# Patient Record
Sex: FEMALE | Race: OTHER | NOT HISPANIC OR LATINO | Employment: OTHER | URBAN - METROPOLITAN AREA
[De-identification: names, ages, dates, MRNs, and addresses within clinical notes are randomized per-mention and may not be internally consistent; named-entity substitution may affect disease eponyms.]

---

## 2018-05-23 ENCOUNTER — APPOINTMENT (RX ONLY)
Dept: URBAN - METROPOLITAN AREA OTHER 10 | Facility: OTHER | Age: 63
Setting detail: DERMATOLOGY
End: 2018-05-23

## 2018-05-23 DIAGNOSIS — D22 MELANOCYTIC NEVI: ICD-10-CM

## 2018-05-23 DIAGNOSIS — B00.1 HERPESVIRAL VESICULAR DERMATITIS: ICD-10-CM

## 2018-05-23 DIAGNOSIS — L82.1 OTHER SEBORRHEIC KERATOSIS: ICD-10-CM

## 2018-05-23 DIAGNOSIS — L57.0 ACTINIC KERATOSIS: ICD-10-CM

## 2018-05-23 DIAGNOSIS — D18.0 HEMANGIOMA: ICD-10-CM

## 2018-05-23 DIAGNOSIS — L81.4 OTHER MELANIN HYPERPIGMENTATION: ICD-10-CM

## 2018-05-23 DIAGNOSIS — L20.89 OTHER ATOPIC DERMATITIS: ICD-10-CM

## 2018-05-23 PROBLEM — I10 ESSENTIAL (PRIMARY) HYPERTENSION: Status: ACTIVE | Noted: 2018-05-23

## 2018-05-23 PROBLEM — D22.72 MELANOCYTIC NEVI OF LEFT LOWER LIMB, INCLUDING HIP: Status: ACTIVE | Noted: 2018-05-23

## 2018-05-23 PROBLEM — D22.71 MELANOCYTIC NEVI OF RIGHT LOWER LIMB, INCLUDING HIP: Status: ACTIVE | Noted: 2018-05-23

## 2018-05-23 PROBLEM — D22.62 MELANOCYTIC NEVI OF LEFT UPPER LIMB, INCLUDING SHOULDER: Status: ACTIVE | Noted: 2018-05-23

## 2018-05-23 PROBLEM — D22.61 MELANOCYTIC NEVI OF RIGHT UPPER LIMB, INCLUDING SHOULDER: Status: ACTIVE | Noted: 2018-05-23

## 2018-05-23 PROBLEM — L20.84 INTRINSIC (ALLERGIC) ECZEMA: Status: ACTIVE | Noted: 2018-05-23

## 2018-05-23 PROBLEM — D22.5 MELANOCYTIC NEVI OF TRUNK: Status: ACTIVE | Noted: 2018-05-23

## 2018-05-23 PROBLEM — D18.01 HEMANGIOMA OF SKIN AND SUBCUTANEOUS TISSUE: Status: ACTIVE | Noted: 2018-05-23

## 2018-05-23 PROBLEM — L85.3 XEROSIS CUTIS: Status: ACTIVE | Noted: 2018-05-23

## 2018-05-23 PROCEDURE — 17000 DESTRUCT PREMALG LESION: CPT

## 2018-05-23 PROCEDURE — ? TREATMENT REGIMEN

## 2018-05-23 PROCEDURE — ? COUNSELING

## 2018-05-23 PROCEDURE — ? OTHER

## 2018-05-23 PROCEDURE — ? LIQUID NITROGEN

## 2018-05-23 PROCEDURE — ? PRESCRIPTION

## 2018-05-23 PROCEDURE — 99214 OFFICE O/P EST MOD 30 MIN: CPT | Mod: 25

## 2018-05-23 RX ORDER — TRETINOIN 0.25 MG/G
CREAM TOPICAL
Qty: 45 | Refills: 1 | COMMUNITY
Start: 2018-05-23

## 2018-05-23 RX ORDER — TRIAMCINOLONE ACETONIDE 1 MG/G
CREAM TOPICAL BID
Qty: 80 | Refills: 1 | Status: ERX

## 2018-05-23 RX ADMIN — TRETINOIN: 0.25 CREAM TOPICAL at 00:00

## 2018-05-23 ASSESSMENT — LOCATION DETAILED DESCRIPTION DERM
LOCATION DETAILED: LEFT ANTERIOR PROXIMAL THIGH
LOCATION DETAILED: LEFT MID-UPPER BACK
LOCATION DETAILED: XIPHOID
LOCATION DETAILED: RIGHT PROXIMAL POSTERIOR UPPER ARM
LOCATION DETAILED: RIGHT ANTERIOR DISTAL UPPER ARM
LOCATION DETAILED: LEFT DISTAL POSTERIOR UPPER ARM
LOCATION DETAILED: RIGHT POPLITEAL SKIN
LOCATION DETAILED: LEFT ANTERIOR DISTAL UPPER ARM
LOCATION DETAILED: SUPERIOR LUMBAR SPINE
LOCATION DETAILED: RIGHT CENTRAL TEMPLE
LOCATION DETAILED: LEFT POPLITEAL SKIN
LOCATION DETAILED: LEFT MEDIAL SUPERIOR CHEST
LOCATION DETAILED: LEFT PROXIMAL POSTERIOR THIGH
LOCATION DETAILED: RIGHT VENTRAL DISTAL FOREARM
LOCATION DETAILED: RIGHT DISTAL POSTERIOR THIGH
LOCATION DETAILED: LEFT PROXIMAL DORSAL FOREARM
LOCATION DETAILED: EPIGASTRIC SKIN
LOCATION DETAILED: RIGHT PROXIMAL PRETIBIAL REGION
LOCATION DETAILED: RIGHT BUTTOCK
LOCATION DETAILED: RIGHT ANTERIOR DISTAL THIGH
LOCATION DETAILED: RIGHT LATERAL ABDOMEN
LOCATION DETAILED: LEFT DISTAL PRETIBIAL REGION
LOCATION DETAILED: RIGHT SUPERIOR UPPER BACK

## 2018-05-23 ASSESSMENT — LOCATION ZONE DERM
LOCATION ZONE: LEG
LOCATION ZONE: TRUNK
LOCATION ZONE: ARM
LOCATION ZONE: FACE

## 2018-05-23 ASSESSMENT — LOCATION SIMPLE DESCRIPTION DERM
LOCATION SIMPLE: RIGHT POSTERIOR THIGH
LOCATION SIMPLE: RIGHT FOREARM
LOCATION SIMPLE: LEFT POSTERIOR THIGH
LOCATION SIMPLE: LEFT PRETIBIAL REGION
LOCATION SIMPLE: RIGHT POPLITEAL SKIN
LOCATION SIMPLE: ABDOMEN
LOCATION SIMPLE: RIGHT UPPER BACK
LOCATION SIMPLE: RIGHT POSTERIOR UPPER ARM
LOCATION SIMPLE: LEFT POSTERIOR UPPER ARM
LOCATION SIMPLE: LEFT UPPER ARM
LOCATION SIMPLE: LEFT FOREARM
LOCATION SIMPLE: CHEST
LOCATION SIMPLE: LEFT UPPER BACK
LOCATION SIMPLE: RIGHT THIGH
LOCATION SIMPLE: RIGHT UPPER ARM
LOCATION SIMPLE: RIGHT PRETIBIAL REGION
LOCATION SIMPLE: RIGHT BUTTOCK
LOCATION SIMPLE: LEFT POPLITEAL SKIN
LOCATION SIMPLE: LOWER BACK
LOCATION SIMPLE: LEFT THIGH
LOCATION SIMPLE: RIGHT TEMPLE

## 2018-05-23 NOTE — HPI: RASH
Is This A New Presentation, Or A Follow-Up?: Rash
Additional History: Established patient presents for a FBSE today. She complains of an itchy red rash on her lower legs that she has had for amount 3 months. She states that she used aloe, lemongrass, and other essential oils, but none have helped. She states that sometimes she gets an itchy rash on her chest, but it’s not present today.

## 2018-05-23 NOTE — PROCEDURE: TREATMENT REGIMEN
Detail Level: Simple
Initiate Treatment: Triamcinolone: Apply twice daily to eczema on lower legs for 2 weeks when flared.
Initiate Treatment: Tretinoin: Apply a pea sized amount to face once daily at night, avoiding lips and eyes

## 2018-05-23 NOTE — PROCEDURE: LIQUID NITROGEN
Post-Care Instructions: I reviewed with the patient in detail post-care instructions. Patient is to wear sunprotection, and avoid picking at any of the treated lesions. Pt may apply Vaseline to crusted or scabbing areas.
Render Post-Care Instructions In Note?: no
Consent: The patient's consent was obtained including but not limited to risks of crusting, scabbing, blistering, scarring, darker or lighter pigmentary change, recurrence, incomplete removal and infection.
Detail Level: Detailed
Number Of Freeze-Thaw Cycles: 2 freeze-thaw cycles
Duration Of Freeze Thaw-Cycle (Seconds): 5

## 2018-05-23 NOTE — PROCEDURE: OTHER
Other (Free Text): Defer Valtrex due to kidney disease
Note Text (......Xxx Chief Complaint.): This diagnosis correlates with the
Detail Level: Simple

## 2020-11-10 ENCOUNTER — LAB OUTSIDE AN ENCOUNTER (OUTPATIENT)
Dept: URBAN - METROPOLITAN AREA CLINIC 92 | Facility: CLINIC | Age: 65
End: 2020-11-10

## 2020-11-10 ENCOUNTER — TELEPHONE ENCOUNTER (OUTPATIENT)
Dept: URBAN - METROPOLITAN AREA CLINIC 92 | Facility: CLINIC | Age: 65
End: 2020-11-10

## 2020-11-22 ENCOUNTER — TELEPHONE ENCOUNTER (OUTPATIENT)
Dept: URBAN - METROPOLITAN AREA CLINIC 92 | Facility: CLINIC | Age: 65
End: 2020-11-22

## 2020-11-22 NOTE — HPI-TODAY'S VISIT:
This is a scheduled follow-up appointment for this patient, a 65 year old /White female, after a previous visit on Oct 2019, for an evaluation for hepatitis C and on treatment evaluation of her newly diagnosed cirrhosis by fibroscan. A ciopy of the note john be sent to the referring provider.  She is asymptomatic. The patient denies having HIV and hepatitis B. She denies alcohol use.      Pt here with her .    Nov 7 2019 glu 98 and cr 1.11 slightly up and na 138 and k 4.4 and cl 100 and total protein 8.2 slightly up. alb 4.6 and tb 0.4 and alk 61 and ast 18 and alt 17 wbc 9.5 and hg 12.9 and plat 335.  HCV pcr neg.  mri 2019:   Document Type: MRI Abdomen w/ + w/o Contrast Document Date: November 06, 2019 10:37  Document Status: Auth (Verified) Document Title: MRI Abdomen w/ + w/o Contrast Performed By: Geoff Farah IV  Verified By: Geoff Farah IV on November 06, 2019 11:23  Encounter info: [id number], [facility], Single Visit OP, 11/6/2019 - 11/6/2019   * Final Report *  Reason For Exam hx of renal cancer  REPORT EXAM: MRI Abdomen w/ + w/o Contrast  CLINICAL INDICATION: hx of renal cancer. Per prior reports, history of hepatitis C.  TECHNIQUE: Multisequence, multiplanar MRI of the abdomen was performed without and with intravenous contrast. ESRC.2.7.3   CONTRAST: 19 cc of Prohance  COMPARISON: Contrast MRI November 5, 2018, abdominal ultrasound March 12, 2019  FINDINGS:  Lower Thorax: Normal.  Liver: Marked out of phase signal drop. Normal hepatic morphology. Subtle small focus of arterial hyperenhancement in segment 2 (11:34) is less prominent, also seen on venous phase but not no washout on delayed, no T2 or diffusion signal, felt to be perfusional. No significant lesions. Vessels are patent.   Gallbladder/Biliary Tree: Normal.  Spleen: Normal size.  Pancreas: Normal.  Adrenal Glands: Normal.   Kidneys/Ureters: There are a few small bilateral cysts. Partial nephrectomy defect in the anterior mid left kidney is stable. No enhancing lesions.  Gastrointestinal: Normal.   Lymph Nodes: Normal.  Vessels: No varices. Incidental replaced right hepatic artery.  Peritoneum/Retroperitoneum: No ascites.  Bones/Soft Tissues: Normal.  IMPRESSION:      1. There is marked hepatic steatosis. Morphologically normal liver. No suspicious lesions. No stigmata of portal hypertension. 2. Stable postsurgical defect in the left kidney with no enhancing lesions.   Signature Line *** Final ***  Electronically Signed By:  Geoff Farah IV on  11/06/2019 11:23  Dictated by:  Geoff Farah IV    Document Type: XR Chest 2 Views PA + Lat Stnd Protocol Document Date: November 06, 2019 11:08  Document Status: Auth (Verified) Document Title: XR Chest 2 Views PA + Lat Stnd Protocol Performed By: Kwadwo Hare  Verified By: Kwadwo Hare on November 06, 2019 13:26  Encounter info: [id number], [facility], Single Visit OP, 11/6/2019 - 11/6/2019   * Final Report *  Reason For Exam Hx of renal cell cancer  REPORT EXAM: XR Chest 2 Views PA + Lat Stnd Protocol  CLINICAL INDICATION: Hx of renal cell cancer  ESRC.[id number]  COMPARISON: None   FINDINGS:  Support Devices: None   Lungs/Pleura: No pneumothorax. No consolidation. No pleural effusion.  Heart/Mediastinum: No cardiomegaly. Mild uncoiling of the aorta.  Bones/Soft tissues: No acute osseous abnormality. Surgical clips in region of GE junction.  IMPRESSION:  Minimal basilar atelectasis. CT chest has increased sensitivity for metastatic disease.   Signature Line *** Final ***  Electronically Signed By:  Kwadwo Hare on  11/06/2019 13:26  Dictated by:  Kwadwo Hare Dr saw: Plan  1. follow up 1 year with MRI abd with and without contrast and appointment afterwards to review results  2. contact us sooner with any concerns  She is going to do the mri in 6m to assess the perfusion changes.  She had a s/p robotic L partial nephrectomy showing papillary RCC in April 2018.   She had neurological issues and she saw Dr. Bazzi and not noted to have brain tumor, noted to have hemorrhorage, she has vascular abnormality. He referred to Dr Looney. She had a cath and looked at flow and saw an anomaly. Says also told re radiation. Nothing done and she says to return prn to see Dr Looney.   Prior labs 2019 na 136 and k 4.0 and alb 4.4 and tb 0.4 and alk 51 and ast 14 and alt 12 and wbc 9.0 and hg 12.5 and plat 282.afp 5.3 normal and hcv pcr negative.  March 2019 u/s:  Document Type: US Abdomen Complete Document Date: March 12, 2019 09:52  Document Status: Auth (Verified) Document Title: US Abdomen Complete Performed By: Paxton Ray  Verified By: Kalpesh Cisneros on March 12, 2019 10:31  Encounter info: [id number], [facility], Single Visit OP, 3/12/2019 - 3/12/2019  * Final Report *  Reason For Exam HEPATITIS C  REPORT EXAM: US Abdomen Complete, US Abdomen Doppler Complete  CLINICAL INDICATION: HEPATITIS C. elevated AFP; Port HTN; left renal cell carcinoma  TECHNIQUE: Grayscale, pulsed wave and color Doppler sonography of the upper abdomen were performed. ESRC.[id number]  COMPARISON: Abdominal ultrasound 7/7/2018  FINDINGS:  Liver: Coarsened echotexture. Echogenic shadowing focus within the anterior right hepatic lobe measuring 6 mm likely representing a small calcification  Bile Ducts: No dilated intrahepatic biliary radicles. Common duct measures 2 mm.  Gallbladder: Normal. Cornelius's sign is negative.  Pancreas: Normal.  Right Kidney: Measures 10.6 cm. Normal echogenicity. No hydronephrosis.  Left Kidney: Measures 10.9 cm. Normal echogenicity. No hydronephrosis.  Spleen: Measures 10.3 cm. Punctate splenic calcifications, likely the sequela of old granulomatous disease.  Aorta: Normal proximally, not imaged distally.   IVC: Normal proximally, not imaged distally.   Hepatic Veins: Normal.  Portal Veins: Hepatopetal flow. 17-25 cm/s.  Hepatic Arteries: Peak systolic velocity 37 cm/s. Resistive index 0.6.  Other: None.  IMPRESSION: 1. Coarsened hepatic echotexture with increased echogenicity consistent with chronic liver disease/hepatic steatosis. No suspicious liver lesions. Patent hepatic vasculature with normal direction of flow. 2. Punctate calcifications within the liver and spleen, likely the sequela of old granulomatous disease.  The images were reviewed and interpreted by Kalpesh Cisneros MD.  Signature Line *** Final ***  Electronically Signed By:  Kalpesh Cisneros on  03/12/2019 10:31  Dictated by:  Paxton Ray Dr to see him in Nov 2020.  IR report showed Nov 15 2018 grade 2 medial parietal avm.  9/11/18 labs gluc 91 cr 0.96 alp 56 tb 0.5 ast 16 alt 11 wbc 7 hgb 12.6 plt 304 apf 5 hep c negative  june 2018 u/s: * Final Report *  Reason For Exam Hepatitis C, chronic  REPORT EXAM: US Abdomen Doppler Complete, US Abdomen Complete  CLINICAL INDICATION: Hepatitis C, chronic. partial L nephrectomy  TECHNIQUE: Grayscale, pulsed wave and color Doppler sonography of the upper abdomen were performed.  COMPARISON: December 12, 2017  FINDINGS:  Liver: Mildly increased echogenicity and nodular contour. No lesions.  Bile Ducts: No dilated intrahepatic biliary radicles. Common duct measures 4 mm.  Gallbladder: Normal. Cornelius's sign is negative.  Pancreas: Obscured by overlying structures.  Right Kidney: Measures 10.7 cm. Normal echogenicity. No hydronephrosis.  Left Kidney: Measures 9.4 cm. Normal echogenicity. No hydronephrosis.  Spleen: Measures 11.8 cm.  Aorta: Normal.  IVC: Normal.  Hepatic Veins: Normal.  Portal Veins: Hepatopedal flow. 23 cm/s.  Hepatic Arteries: Peak systolic velocity 28 cm/s. Resistive index 0.7.  Other: None.  IMPRESSION: 1. Echogenic liver with nodular contour suggestive of hepatic steatosis/chronic liver disease. 2. Patent hepatic vasculature.  Signature Line *** Final ***  Electronically Signed By:  Kalpesh Cisneros on  06/07/2018 10:40  july 2018 ct c/a/p no acute findings, colonic diverticulosis, atherosclerosis, granulomatous dz noted in liver. post surgical changes suggested involvoing the left kidney  ct chead july 2018 acute parenchymal hemorrhage in right parietal lobe  4/16/18 kidney path:  KIDNEY, LEFT, MASS, ROBOTIC PARTIAL NEPHRECTOMY: Renal cell carcinoma, papillary type 1, greatest carcinoma dimension 4.4 cm Carcinoma is limited to the kidney Pedro nuclear grade G2 Clear cell change in approximately 10% tumor, no sarcomatoid change identified Carcinoma at inked parenchymal margin of excision (tumor grossly perforated) Uninvolved renal parenchyma with chronic interstitial nephritis  LYMPH NODES, PARA-AORTIC, EXCISION: No metastatic carcinoma identified in three lymph nodes (0/3) Focus of endosalpingiosis in one lymph node  SOFT TISSUE, UMM-TUMOR FAT, EXCISION: Benign fibroadipose tissue No carcinoma identified   Plan is to do spep to check the protein issue and redo labs and mri in 6m.  Duration of visit mins with over 50% of the time explaining pt's condition and treatment plan with the pt

## 2020-11-24 ENCOUNTER — TELEPHONE ENCOUNTER (OUTPATIENT)
Dept: URBAN - METROPOLITAN AREA CLINIC 92 | Facility: CLINIC | Age: 65
End: 2020-11-24

## 2020-11-24 LAB
A/G RATIO: 1.3
ALBUMIN: 4.3
ALKALINE PHOSPHATASE: 66
ALT (SGPT): 16
AST (SGOT): 18
BASO (ABSOLUTE): 0.1
BASOS: 1
BILIRUBIN, TOTAL: 0.2
BUN/CREATININE RATIO: 16
BUN: 17
CALCIUM: 9.5
CARBON DIOXIDE, TOTAL: 24
CHLORIDE: 99
CREATININE: 1.05
EGFR IF AFRICN AM: 64
EGFR IF NONAFRICN AM: 55
EOS (ABSOLUTE): 0.2
EOS: 2
GLOBULIN, TOTAL: 3.2
GLUCOSE: 122
HCV LOG10: (no result)
HEMATOCRIT: 35.9
HEMATOLOGY COMMENTS:: (no result)
HEMOGLOBIN: 12.1
HEPATITIS C QUANTITATION: <12
IMMATURE CELLS: (no result)
IMMATURE GRANS (ABS): 0
IMMATURE GRANULOCYTES: 1
INR: 0.9
LYMPHS (ABSOLUTE): 2.3
LYMPHS: 26
MCH: 31.2
MCHC: 33.7
MCV: 93
MONOCYTES(ABSOLUTE): 0.6
MONOCYTES: 7
NEUTROPHILS (ABSOLUTE): 5.6
NEUTROPHILS: 63
NRBC: (no result)
PLATELETS: 338
POTASSIUM: 4.2
PROTEIN, TOTAL: 7.5
PROTHROMBIN TIME: 10
RBC: 3.88
RDW: 13.2
SODIUM: 137
TEST INFORMATION:: (no result)
WBC: 8.7

## 2020-11-24 NOTE — HPI-OTHER HISTORIES
Dear Mary White The results of your recent tests are explained below: nov 10 and inr 0.9 and hcv <12. ast 18 and alt 16 so at ideal. Tb 0.2 and alk 66. na 137 and k 4.2 and cl 99 and co2 24 and bun 17 and cr 1.05 slightly up and glu 122 elevated and maybe not fasting and show local md. wbc 8.7 hg 12.1 plat 338. mcv 93.

## 2020-11-30 ENCOUNTER — OFFICE VISIT (OUTPATIENT)
Dept: URBAN - METROPOLITAN AREA TELEHEALTH 2 | Facility: TELEHEALTH | Age: 65
End: 2020-11-30
Payer: MEDICARE

## 2020-11-30 DIAGNOSIS — R74.8 ABNORMAL LIVER ENZYMES: ICD-10-CM

## 2020-11-30 DIAGNOSIS — K44.9 HIATAL HERNIA: ICD-10-CM

## 2020-11-30 DIAGNOSIS — Z85.528 HISTORY OF RENAL CELL CANCER: ICD-10-CM

## 2020-11-30 DIAGNOSIS — I10 HYPERTENSION: ICD-10-CM

## 2020-11-30 DIAGNOSIS — B96.81 HELICOBACTER PYLORI (H. PYLORI) INFECTION: ICD-10-CM

## 2020-11-30 DIAGNOSIS — K76.9 LIVER LESION: ICD-10-CM

## 2020-11-30 DIAGNOSIS — M10.09 GOUT, ARTHRITIS: ICD-10-CM

## 2020-11-30 DIAGNOSIS — K74.00 HEPATIC FIBROSIS: ICD-10-CM

## 2020-11-30 DIAGNOSIS — Z79.899 HIGH RISK MEDICATION USE: ICD-10-CM

## 2020-11-30 DIAGNOSIS — C18.9 COLON CANCER: ICD-10-CM

## 2020-11-30 DIAGNOSIS — B18.2 CHRONIC ACTIVE HEPATITIS C: ICD-10-CM

## 2020-11-30 DIAGNOSIS — Z85.038 HISTORY OF COLON CANCER: ICD-10-CM

## 2020-11-30 PROCEDURE — 99214 OFFICE O/P EST MOD 30 MIN: CPT

## 2020-11-30 PROCEDURE — 1036F TOBACCO NON-USER: CPT

## 2020-11-30 PROCEDURE — G9903 PT SCRN TBCO ID AS NON USER: HCPCS

## 2020-11-30 PROCEDURE — G8427 DOCREV CUR MEDS BY ELIG CLIN: HCPCS

## 2020-11-30 PROCEDURE — G8417 CALC BMI ABV UP PARAM F/U: HCPCS

## 2020-11-30 PROCEDURE — G8483 FLU IMM NO ADMIN DOC REA: HCPCS

## 2020-11-30 PROCEDURE — 3017F COLORECTAL CA SCREEN DOC REV: CPT

## 2020-11-30 RX ORDER — ESTRADIOL 1 MG/1
TAKE 1 TABLET (1 MG) BY ORAL ROUTE ONCE DAILY TABLET ORAL 1
Qty: 0 | Refills: 0 | Status: ACTIVE | COMMUNITY
Start: 1900-01-01

## 2020-11-30 RX ORDER — ASCORBIC ACID 125 MG
AS DIRECTED TABLET,CHEWABLE ORAL
Status: ACTIVE | COMMUNITY

## 2020-11-30 RX ORDER — ENALAPRIL MALEATE AND HYDROCHLOROTHIAZIDE 10; 25 MG/1; MG/1
TAKE 1 TABLET BY ORAL ROUTE ONCE DAILY TABLET ORAL 1
Qty: 0 | Refills: 0 | Status: ACTIVE | COMMUNITY
Start: 1900-01-01

## 2020-11-30 RX ORDER — UBIDECARENONE/VIT E ACET 100MG-5
1 CAPSULE CAPSULE ORAL ONCE A DAY
Status: ACTIVE | COMMUNITY

## 2020-11-30 RX ORDER — DILTIAZEM HYDROCHLORIDE 240 MG/1
TAKE 1 CAPSULE (240 MG) BY ORAL ROUTE ONCE DAILY CAPSULE, COATED, EXTENDED RELEASE ORAL 1
Qty: 0 | Refills: 0 | Status: ACTIVE | COMMUNITY
Start: 1900-01-01

## 2020-11-30 RX ORDER — LEVOTHYROXINE SODIUM 100 UG/1
TAKE 1 TABLET (100 MCG) BY ORAL ROUTE ONCE DAILY TABLET ORAL 1
Qty: 0 | Refills: 0 | Status: ACTIVE | COMMUNITY
Start: 1900-01-01

## 2020-11-30 RX ORDER — MAGNESIUM OXIDE 400 MG/1
1 TABLET AS NEEDED TABLET ORAL ONCE A DAY
Status: ACTIVE | COMMUNITY

## 2020-11-30 NOTE — HPI-TODAY'S VISIT:
This is a scheduled follow-up appointment for this patient, a 65 year old /White female, after a previous visit on Oct 2019, for an evaluation for hepatitis C and on treatment evaluation of her newly diagnosed cirrhosis by fibroscan.   A ciopy of the note john be sent to the referring provider.    Chart review:  Nov 10 and inr 0.9 and hcv  less than 12. ast 18 and alt 16 so at ideal. Tb 0.2 and alk 66. na 137 and k 4.2 and cl 99 and co2 24 and bun 17 and cr 1.05 slightly up and glu 122 elevated and maybe not fasting and show local md. wbc 8.7 hg 12.1 plat 338. mcv 93.  She says she is not sure if she was fasting or not. She says brother in law  and going to [place] and so think ate on the run to do this and then go there.  Says sugar in 2020 ok.  Dr Sanderson: saw recently in 2020 and in person and he told her he will next year do just a sonogram in  his office. Said looked good.  2020 na 1398 and k 3.5 and cl 103 and bun 18 and cr 1.30 and glu 131 and alb 4.2 and tv 0.3 and ast 17 and alt 15 and alk 56 and wbc 10.3 and hg 12.6 plat 329.    		 Document Type:	MRI Abdomen w/ + w/o Contrast Document Date:	November 10, 2020 11:36  Document Status:	Auth (Verified) Document Title:	MRI Abdomen w/ + w/o Contrast Performed By:	Juanpablo Reza  Verified By:	Juanpablo Reza on November 10, 2020 13:26  Encounter info:	[id number], [facility], Single Visit OP, 11/10/2020 - 11/10/2020   * Final Report *  Reason For Exam surveillance; left robotic partial nephrectomy 2018 for a stage I papillary renal cell carcinoma, pt has abdominal aneurysm;Malignant neoplasm  REPORT EXAM: MRI Abdomen w/ + w/o Contrast  CLINICAL INDICATION: Surveillance status post left robotic partial for papillary renal cell carcinoma.   TECHNIQUE: Multisequence, multiplanar MRI of the abdomen was performed without and with intravenous contrast. ESRC.2.7.3  CONTRAST: 19 cc of Prohance  COMPARISON: 2019  FINDINGS:   Lower Thorax: Normal.  Liver: Moderate diffuse hepatic steatosis. Unchanged 1 cm arterially hyperenhancing lesion within segment 2 without correlate on other sequences, likely a perfusional anomaly (9:34). No suspicious hepatic lesion.  Gallbladder/Biliary Tree: Normal.  Spleen: Normal.  Pancreas: Normal.  Adrenal Glands: Normal.   Kidneys/Ureters: Prior partial left nephrectomy without locally recurrent disease. Multiple bilateral renal cysts. No hydronephrosis.  Gastrointestinal: Normal.   Lymph Nodes: Normal.  Vessels: Nonaneurysmal aorta. Replaced right hepatic artery arises from the SMA.  Peritoneum/Retroperitoneum: Normal.  Bones/Soft Tissues: Postsurgical changes within anterior abdominal wall. Multilevel spinal degenerative changes. No aggressive bone lesions.   IMPRESSION: Prior left partial nephrectomy without locally recurrent or metastatic disease within the abdomen.  Signature Line *** Final ***  Electronically Signed By:  Juanpablo Reza on  11/10/2020 13:26  Dictated by:  Juanpablo Reza  need mri in 6m for the perfusion change.  2019 glu 98 and cr 1.11 slightly up and na 138 and k 4.4 and cl 100 and total protein 8.2 slightly up. alb 4.6 and tb 0.4 and alk 61 and ast 18 and alt 17 wbc 9.5 and hg 12.9 and plat 335.  HCV pcr neg.  mri 2019:   Document Type: MRI Abdomen w/ + w/o Contrast Document Date: 2019 10:37  Document Status: Auth (Verified) Document Title: MRI Abdomen w/ + w/o Contrast Performed By: Geoff Farah IV  Verified By: Geoff Farah IV on 2019 11:23  Encounter info: [id number], [facility], Single Visit OP, 2019 - 2019   * Final Report *  Reason For Exam hx of renal cancer  REPORT EXAM: MRI Abdomen w/ + w/o Contrast  CLINICAL INDICATION: hx of renal cancer. Per prior reports, history of hepatitis C.  TECHNIQUE: Multisequence, multiplanar MRI of the abdomen was performed without and with intravenous contrast. ESRC.2.7.3   CONTRAST: 19 cc of Prohance  COMPARISON: Contrast MRI 2018, abdominal ultrasound 2019  FINDINGS:  Lower Thorax: Normal.  Liver: Marked out of phase signal drop. Normal hepatic morphology. Subtle small focus of arterial hyperenhancement in segment 2 (11:34) is less prominent, also seen on venous phase but not no washout on delayed, no T2 or diffusion signal, felt to be perfusional. No significant lesions. Vessels are patent.   Gallbladder/Biliary Tree: Normal.  Spleen: Normal size.  Pancreas: Normal.  Adrenal Glands: Normal.   Kidneys/Ureters: There are a few small bilateral cysts. Partial nephrectomy defect in the anterior mid left kidney is stable. No enhancing lesions.  Gastrointestinal: Normal.   Lymph Nodes: Normal.  Vessels: No varices. Incidental replaced right hepatic artery.  Peritoneum/Retroperitoneum: No ascites.  Bones/Soft Tissues: Normal.  IMPRESSION:      1. There is marked hepatic steatosis. Morphologically normal liver. No suspicious lesions. No stigmata of portal hypertension. 2. Stable postsurgical defect in the left kidney with no enhancing lesions.   Signature Line *** Final ***  Electronically Signed By:  Geoff Farah IV on  2019 11:23  Dictated by:  Geoff Farah IV    Document Type: XR Chest 2 Views PA + Lat Stnd Protocol Document Date: 2019 11:08  Document Status: Auth (Verified) Document Title: XR Chest 2 Views PA + Lat Stnd Protocol Performed By: Kwadwo Hare  Verified By: Kwadwo Hare on 2019 13:26  Encounter info: [id number], [facility], Single Visit OP, 2019 - 2019   * Final Report *  Reason For Exam Hx of renal cell cancer  REPORT EXAM: XR Chest 2 Views PA + Lat Stnd Protocol  CLINICAL INDICATION: Hx of renal cell cancer  ESRC.4.6.1  COMPARISON: None   FINDINGS:  Support Devices: None   Lungs/Pleura: No pneumothorax. No consolidation. No pleural effusion.  Heart/Mediastinum: No cardiomegaly. Mild uncoiling of the aorta.  Bones/Soft tissues: No acute osseous abnormality. Surgical clips in region of GE junction.  IMPRESSION:  Minimal basilar atelectasis. CT chest has increased sensitivity for metastatic disease.   Signature Line *** Final ***  Electronically Signed By:  Kwadwo Hare on  2019 13:26  Dictated by:  Kwadwo Hare  She had a s/p robotic L partial nephrectomy showing papillary RCC in 2018.   She had neurological issues and she saw Dr. Bazzi and not noted to have brain tumor, noted to have hemorrhorage, she has vascular abnormality. He referred to Dr Looney. She had a cath and looked at flow and saw an anomaly. Says also told re radiation. Nothing done and she says to return prn to see Dr Looney.   Prior labs  na 136 and k 4.0 and alb 4.4 and tb 0.4 and alk 51 and ast 14 and alt 12 and wbc 9.0 and hg 12.5 and plat 282.afp 5.3 normal and hcv pcr negative.  2019 u/s:  Document Type: US Abdomen Complete Document Date: 2019 09:52  Document Status: Auth (Verified) Document Title: US Abdomen Complete Performed By: Paxton Ray  Verified By: Kalpesh Cisneros on 2019 10:31  Encounter info: [id number], [facility], Single Visit OP, 3/12/2019 - 3/12/2019  * Final Report *  Reason For Exam HEPATITIS C  REPORT EXAM: US Abdomen Complete, US Abdomen Doppler Complete  CLINICAL INDICATION: HEPATITIS C. elevated AFP; Port HTN; left renal cell carcinoma  TECHNIQUE: Grayscale, pulsed wave and color Doppler sonography of the upper abdomen were performed. ESRC.3.7.1  COMPARISON: Abdominal ultrasound 2018  FINDINGS:  Liver: Coarsened echotexture. Echogenic shadowing focus within the anterior right hepatic lobe measuring 6 mm likely representing a small calcification  Bile Ducts: No dilated intrahepatic biliary radicles. Common duct measures 2 mm.  Gallbladder: Normal. Cornelius's sign is negative.  Pancreas: Normal.  Right Kidney: Measures 10.6 cm. Normal echogenicity. No hydronephrosis.  Left Kidney: Measures 10.9 cm. Normal echogenicity. No hydronephrosis.  Spleen: Measures 10.3 cm. Punctate splenic calcifications, likely the sequela of old granulomatous disease.  Aorta: Normal proximally, not imaged distally.   IVC: Normal proximally, not imaged distally.   Hepatic Veins: Normal.  Portal Veins: Hepatopetal flow. 17-25 cm/s.  Hepatic Arteries: Peak systolic velocity 37 cm/s. Resistive index 0.6.  Other: None.  IMPRESSION: 1. Coarsened hepatic echotexture with increased echogenicity consistent with chronic liver disease/hepatic steatosis. No suspicious liver lesions. Patent hepatic vasculature with normal direction of flow. 2. Punctate calcifications within the liver and spleen, likely the sequela of old granulomatous disease.  The images were reviewed and interpreted by Kalpesh Cisneros MD.  Signature Line *** Final ***  Electronically Signed By:  Kalpesh Cisneros on  2019 10:31  Dictated by:  Paxton Ray Dr to see him in 2020.  IR report showed Nov 15 2018 grade 2 medial parietal avm.  18 labs gluc 91 cr 0.96 alp 56 tb 0.5 ast 16 alt 11 wbc 7 hgb 12.6 plt 304 apf 5 hep c negative  2018 u/s: * Final Report *  Reason For Exam Hepatitis C, chronic  REPORT EXAM: US Abdomen Doppler Complete, US Abdomen Complete  CLINICAL INDICATION: Hepatitis C, chronic. partial L nephrectomy  TECHNIQUE: Grayscale, pulsed wave and color Doppler sonography of the upper abdomen were performed.  COMPARISON: 2017  FINDINGS:  Liver: Mildly increased echogenicity and nodular contour. No lesions.  Bile Ducts: No dilated intrahepatic biliary radicles. Common duct measures 4 mm.  Gallbladder: Normal. Cornelius's sign is negative.  Pancreas: Obscured by overlying structures.  Right Kidney: Measures 10.7 cm. Normal echogenicity. No hydronephrosis.  Left Kidney: Measures 9.4 cm. Normal echogenicity. No hydronephrosis.  Spleen: Measures 11.8 cm.  Aorta: Normal.  IVC: Normal.  Hepatic Veins: Normal.  Portal Veins: Hepatopedal flow. 23 cm/s.  Hepatic Arteries: Peak systolic velocity 28 cm/s. Resistive index 0.7.  Other: None.  IMPRESSION: 1. Echogenic liver with nodular contour suggestive of hepatic steatosis/chronic liver disease. 2. Patent hepatic vasculature.  Signature Line *** Final ***  Electronically Signed By:  Kalpesh Cisneros on  2018 10:40  2018 ct c/a/p no acute findings, colonic diverticulosis, atherosclerosis, granulomatous dz noted in liver. post surgical changes suggested involvoing the left kidney  ct chead 2018 acute parenchymal hemorrhage in right parietal lobe  18 kidney path:  KIDNEY, LEFT, MASS, ROBOTIC PARTIAL NEPHRECTOMY: Renal cell carcinoma, papillary type 1, greatest carcinoma dimension 4.4 cm Carcinoma is limited to the kidney Pedro nuclear grade G2 Clear cell change in approximately 10% tumor, no sarcomatoid change identified Carcinoma at inked parenchymal margin of excision (tumor grossly perforated) Uninvolved renal parenchyma with chronic interstitial nephritis  LYMPH NODES, PARA-AORTIC, EXCISION: No metastatic carcinoma identified in three lymph nodes (0/3) Focus of endosalpingiosis in one lymph node  SOFT TISSUE, UMM-TUMOR FAT, EXCISION: Benign fibroadipose tissue No carcinoma identified   Plan: 1. Mri in may to check on perfusion change. 2. Pt will do labs in may. 3. Follow with primary md for her sugars.   Stressed to pt the need for social distancing and strict handwashing and wearing a mask and to follow any other new or added CDC recommendations as this is an evolving target.  Duration of visit 32 mins via doximKout audio/phone as doximity video failed with over 50% of the time explaining pt's condition and treatment plan with the pt.

## 2021-03-09 ENCOUNTER — LAB OUTSIDE AN ENCOUNTER (OUTPATIENT)
Dept: URBAN - METROPOLITAN AREA CLINIC 92 | Facility: CLINIC | Age: 66
End: 2021-03-09

## 2021-03-09 ENCOUNTER — TELEPHONE ENCOUNTER (OUTPATIENT)
Dept: URBAN - METROPOLITAN AREA CLINIC 92 | Facility: CLINIC | Age: 66
End: 2021-03-09

## 2021-03-09 PROBLEM — 429699009: Status: ACTIVE | Noted: 2021-03-09

## 2021-03-09 PROBLEM — 428305005: Status: ACTIVE | Noted: 2021-03-09

## 2021-04-20 ENCOUNTER — OFFICE VISIT (OUTPATIENT)
Dept: URBAN - METROPOLITAN AREA SURGERY CENTER 30 | Facility: SURGERY CENTER | Age: 66
End: 2021-04-20
Payer: MEDICARE

## 2021-04-20 DIAGNOSIS — Z86.010 H/O ADENOMATOUS POLYP OF COLON: ICD-10-CM

## 2021-04-20 PROCEDURE — G0105 COLORECTAL SCRN; HI RISK IND: HCPCS | Performed by: INTERNAL MEDICINE

## 2021-04-20 PROCEDURE — G8907 PT DOC NO EVENTS ON DISCHARG: HCPCS | Performed by: INTERNAL MEDICINE

## 2021-05-03 ENCOUNTER — OFFICE VISIT (OUTPATIENT)
Dept: URBAN - METROPOLITAN AREA CLINIC 74 | Facility: CLINIC | Age: 66
End: 2021-05-03

## 2021-05-13 ENCOUNTER — TELEPHONE ENCOUNTER (OUTPATIENT)
Dept: URBAN - METROPOLITAN AREA CLINIC 92 | Facility: CLINIC | Age: 66
End: 2021-05-13

## 2021-05-13 NOTE — HPI-TODAY'S VISIT:
Brandy White, May 11 MRI shows the liver to be fatty at 15 to 16%.  Desired is less than 6%.  Minimal surface nodularity still seen in the liver but without definite findings of cirrhosis. Redemonstrated 6 mm segment to hyper and see lesion seen which they feel is a perfusion anomaly.  We should check on that in 6 months. Gallbladder biliary tree were normal.  Spleen normal.  Pancreas normal.  Postsurgical changes seen of partial left nephrectomy but without evidence of recurrence seen. Redemonstrated subcentimeter left renal cyst seen. They mention that you have an anatomic variant of the replaced left hepatic artery arising from the left gastric artery.  Also accessory or replaced right hepatic artery arising from the superior mesenteric artery. Postsurgical changes also seen along the anterior abdominal wall. Overall they found no evidence of recurrent or metastatic disease within the abdomen from the previous left partial nephrectomy and the liver was fatty at 15 to 16% fat. Dr. Lomeli

## 2021-05-15 ENCOUNTER — TELEPHONE ENCOUNTER (OUTPATIENT)
Dept: URBAN - METROPOLITAN AREA CLINIC 92 | Facility: CLINIC | Age: 66
End: 2021-05-15

## 2021-05-15 LAB
A/G RATIO: 1.4
ALBUMIN: 4.7
ALKALINE PHOSPHATASE: 63
ALT (SGPT): 20
AST (SGOT): 24
BASO (ABSOLUTE): 0.1
BASOS: 1
BILIRUBIN, TOTAL: 0.3
BUN/CREATININE RATIO: 13
BUN: 13
CALCIUM: 9.5
CARBON DIOXIDE, TOTAL: 22
CHLORIDE: 102
CREATININE: 1.03
EGFR IF AFRICN AM: 65
EGFR IF NONAFRICN AM: 56
EOS (ABSOLUTE): 0.2
EOS: 2
GLOBULIN, TOTAL: 3.3
GLUCOSE: 82
HCV LOG10: (no result)
HEMATOCRIT: 37.3
HEMATOLOGY COMMENTS:: (no result)
HEMOGLOBIN: 12.9
HEPATITIS C QUANTITATION: <12
IMMATURE CELLS: (no result)
IMMATURE GRANS (ABS): 0.1
IMMATURE GRANULOCYTES: 1
LYMPHS (ABSOLUTE): 2.2
LYMPHS: 23
MCH: 31.4
MCHC: 34.6
MCV: 91
MONOCYTES(ABSOLUTE): 0.6
MONOCYTES: 6
NEUTROPHILS (ABSOLUTE): 6.4
NEUTROPHILS: 67
NRBC: (no result)
PLATELETS: 363
POTASSIUM: 4.3
PROTEIN, TOTAL: 8
RBC: 4.11
RDW: 13.1
SODIUM: 140
TEST INFORMATION:: (no result)
WBC: 9.5

## 2021-05-15 NOTE — HPI-TODAY'S VISIT:
May 11: hcv pcr less than 12 and so great to see. Ast 24 and alt 20 and alk 63 and tb 0.3 and ca 9.5 and na 140 and k 4.3 and cr 1.03 and bun 13. Glu 82. Wbc 9.5 and hg 12.9 and plat 363. Mcv 91.  Prior cr 1.05 and so little lower now 1.03.

## 2021-05-17 ENCOUNTER — LAB OUTSIDE AN ENCOUNTER (OUTPATIENT)
Dept: URBAN - METROPOLITAN AREA TELEHEALTH 2 | Facility: TELEHEALTH | Age: 66
End: 2021-05-17

## 2021-05-24 ENCOUNTER — OFFICE VISIT (OUTPATIENT)
Dept: URBAN - METROPOLITAN AREA TELEHEALTH 2 | Facility: TELEHEALTH | Age: 66
End: 2021-05-24
Payer: MEDICARE

## 2021-05-24 DIAGNOSIS — K76.89 LESION OF LIVER: ICD-10-CM

## 2021-05-24 DIAGNOSIS — R74.8 ABNORMAL LIVER ENZYMES: ICD-10-CM

## 2021-05-24 DIAGNOSIS — K74.00 HEPATIC FIBROSIS: ICD-10-CM

## 2021-05-24 DIAGNOSIS — B18.2 CHRONIC ACTIVE HEPATITIS C: ICD-10-CM

## 2021-05-24 PROCEDURE — 99214 OFFICE O/P EST MOD 30 MIN: CPT

## 2021-05-24 RX ORDER — ESTRADIOL 1 MG/1
TAKE 1 TABLET (1 MG) BY ORAL ROUTE ONCE DAILY TABLET ORAL 1
Qty: 0 | Refills: 0 | Status: ACTIVE | COMMUNITY
Start: 1900-01-01

## 2021-05-24 RX ORDER — DILTIAZEM HYDROCHLORIDE 240 MG/1
TAKE 1 CAPSULE (240 MG) BY ORAL ROUTE ONCE DAILY CAPSULE, COATED, EXTENDED RELEASE ORAL 1
Qty: 0 | Refills: 0 | Status: ACTIVE | COMMUNITY
Start: 1900-01-01

## 2021-05-24 RX ORDER — COMPOUNDING SYRUP VEHICLE 1 G/ML
AS DIRECTED SYRUP ORAL
Status: ACTIVE | COMMUNITY

## 2021-05-24 RX ORDER — ENALAPRIL MALEATE AND HYDROCHLOROTHIAZIDE 10; 25 MG/1; MG/1
TAKE 1 TABLET BY ORAL ROUTE ONCE DAILY TABLET ORAL 1
Qty: 0 | Refills: 0 | Status: ACTIVE | COMMUNITY
Start: 1900-01-01

## 2021-05-24 RX ORDER — ASCORBIC ACID 125 MG
AS DIRECTED TABLET,CHEWABLE ORAL
Status: ACTIVE | COMMUNITY

## 2021-05-24 RX ORDER — LEVOTHYROXINE SODIUM 100 UG/1
TAKE 1 TABLET (100 MCG) BY ORAL ROUTE ONCE DAILY TABLET ORAL 1
Qty: 0 | Refills: 0 | Status: ACTIVE | COMMUNITY
Start: 1900-01-01

## 2021-05-24 RX ORDER — UBIDECARENONE/VIT E ACET 100MG-5
1 CAPSULE CAPSULE ORAL ONCE A DAY
Status: ACTIVE | COMMUNITY

## 2021-05-24 RX ORDER — MAGNESIUM OXIDE 400 MG/1
1 TABLET AS NEEDED TABLET ORAL ONCE A DAY
Status: ACTIVE | COMMUNITY

## 2021-05-24 NOTE — HPI-TODAY'S VISIT:
This is a scheduled follow-up appointment for this patient, a 65 year old /White female, after a previous visit 2020  for an evaluation for hepatitis C and on treatment evaluation of her newly diagnosed cirrhosis by fibroscan.   A copy of the note john be sent to the referring provider.    Chart review:  May 11: hcv pcr less than 12 and so great to see. Ast 24 and alt 20 and alk 63 and tb 0.3 and ca 9.5 and na 140 and k 4.3 and cr 1.03 and bun 13. Glu 82. Wbc 9.5 and hg 12.9 and plat 363. Mcv 91.  Prior cr 1.05 and so little lower now 1.03.   May 11 MRI shows the liver to be fatty at 15 to 16%.  Desired is less than 6%.  Minimal surface nodularity still seen in the liver but without definite findings of cirrhosis. Redemonstrated 6 mm segment to hyperechoic lesion seen which they feel is a perfusion anomaly.  We should check on that in 6 months. Gallbladder biliary tree were normal.  Spleen normal.  Pancreas normal.  Postsurgical changes seen of partial left nephrectomy but without evidence of recurrence seen. Redemonstrated subcentimeter left renal cyst seen. They mention that you have an anatomic variant of the replaced left hepatic artery arising from the left gastric artery.  Also accessory or replaced right hepatic artery arising from the superior mesenteric artery. Postsurgical changes also seen along the anterior abdominal wall. Overall they found no evidence of recurrent or metastatic disease within the abdomen from the previous left partial nephrectomy and the liver was fatty at 15 to 16% fat.  Nov 10 and inr 0.9 and hcv  less than 12. ast 18 and alt 16 so at ideal. Tb 0.2 and alk 66. na 137 and k 4.2 and cl 99 and co2 24 and bun 17 and cr 1.05 slightly up and glu 122 elevated and maybe not fasting and show local md. wbc 8.7 hg 12.1 plat 338. mcv 93.    She says brother in law  and going to [place] and so think ate on the run to do this and then go there.  Dr Sanderson: saw recently in 2020 and in person and he told her he will next year do just a sonogram in  his office. Said looked good.  2020 na 1398 and k 3.5 and cl 103 and bun 18 and cr 1.30 and glu 131 and alb 4.2 and tv 0.3 and ast 17 and alt 15 and alk 56 and wbc 10.3 and hg 12.6 plat 329.    		 Document Type:	MRI Abdomen w/ + w/o Contrast Document Date:	November 10, 2020 11:36  Document Status:	Auth (Verified) Document Title:	MRI Abdomen w/ + w/o Contrast Performed By:	Juanpablo Reza  Verified By:	Juanpablo Reza on November 10, 2020 13:26  Encounter info:	[id number], [facility], Single Visit OP, 11/10/2020 - 11/10/2020   * Final Report *  Reason For Exam surveillance; left robotic partial nephrectomy 2018 for a stage I papillary renal cell carcinoma, pt has abdominal aneurysm;Malignant neoplasm  REPORT EXAM: MRI Abdomen w/ + w/o Contrast  CLINICAL INDICATION: Surveillance status post left robotic partial for papillary renal cell carcinoma.   TECHNIQUE: Multisequence, multiplanar MRI of the abdomen was performed without and with intravenous contrast. ESRC.2.7.3  CONTRAST: 19 cc of Prohance  COMPARISON: 2019  FINDINGS:   Lower Thorax: Normal.  Liver: Moderate diffuse hepatic steatosis. Unchanged 1 cm arterially hyperenhancing lesion within segment 2 without correlate on other sequences, likely a perfusional anomaly (9:34). No suspicious hepatic lesion.  Gallbladder/Biliary Tree: Normal.  Spleen: Normal.  Pancreas: Normal.  Adrenal Glands: Normal.   Kidneys/Ureters: Prior partial left nephrectomy without locally recurrent disease. Multiple bilateral renal cysts. No hydronephrosis.  Gastrointestinal: Normal.   Lymph Nodes: Normal.  Vessels: Nonaneurysmal aorta. Replaced right hepatic artery arises from the SMA.  Peritoneum/Retroperitoneum: Normal.  Bones/Soft Tissues: Postsurgical changes within anterior abdominal wall. Multilevel spinal degenerative changes. No aggressive bone lesions.   IMPRESSION: Prior left partial nephrectomy without locally recurrent or metastatic disease within the abdomen.  Signature Line *** Final ***  Electronically Signed By:  Juanpablo Reza on  11/10/2020 13:26  Dictated by:  Juanpablo Reza    2019 glu 98 and cr 1.11 slightly up and na 138 and k 4.4 and cl 100 and total protein 8.2 slightly up. alb 4.6 and tb 0.4 and alk 61 and ast 18 and alt 17 wbc 9.5 and hg 12.9 and plat 335.  HCV pcr neg.  mri 2019:   Document Type: MRI Abdomen w/ + w/o Contrast Document Date: 2019 10:37  Document Status: Auth (Verified) Document Title: MRI Abdomen w/ + w/o Contrast Performed By: Geoff Farah IV  Verified By: Geoff Farah IV on 2019 11:23  Encounter info: [id number], [facility], Single Visit OP, 2019 - 2019   * Final Report *  Reason For Exam hx of renal cancer  REPORT EXAM: MRI Abdomen w/ + w/o Contrast  CLINICAL INDICATION: hx of renal cancer. Per prior reports, history of hepatitis C.  TECHNIQUE: Multisequence, multiplanar MRI of the abdomen was performed without and with intravenous contrast. ESRC.2.7.3   CONTRAST: 19 cc of Prohance  COMPARISON: Contrast MRI 2018, abdominal ultrasound 2019  FINDINGS:  Lower Thorax: Normal.  Liver: Marked out of phase signal drop. Normal hepatic morphology. Subtle small focus of arterial hyperenhancement in segment 2 (11:34) is less prominent, also seen on venous phase but not no washout on delayed, no T2 or diffusion signal, felt to be perfusional. No significant lesions. Vessels are patent.   Gallbladder/Biliary Tree: Normal.  Spleen: Normal size.  Pancreas: Normal.  Adrenal Glands: Normal.   Kidneys/Ureters: There are a few small bilateral cysts. Partial nephrectomy defect in the anterior mid left kidney is stable. No enhancing lesions.  Gastrointestinal: Normal.   Lymph Nodes: Normal.  Vessels: No varices. Incidental replaced right hepatic artery.  Peritoneum/Retroperitoneum: No ascites.  Bones/Soft Tissues: Normal.  IMPRESSION:      1. There is marked hepatic steatosis. Morphologically normal liver. No suspicious lesions. No stigmata of portal hypertension. 2. Stable postsurgical defect in the left kidney with no enhancing lesions.   Signature Line *** Final ***  Electronically Signed By:  Geoff Farah IV on  2019 11:23  Dictated by:  Geoff Farah IV    Document Type: XR Chest 2 Views PA + Lat Stnd Protocol Document Date: 2019 11:08  Document Status: Auth (Verified) Document Title: XR Chest 2 Views PA + Lat Stnd Protocol Performed By: Kwadwo Hare  Verified By: Kwadwo Hare on 2019 13:26  Encounter info: [id number], [facility], Single Visit OP, 2019 - 2019   * Final Report *  Reason For Exam Hx of renal cell cancer  REPORT EXAM: XR Chest 2 Views PA + Lat Stnd Protocol  CLINICAL INDICATION: Hx of renal cell cancer  ESRC.4.6.1  COMPARISON: None   FINDINGS:  Support Devices: None   Lungs/Pleura: No pneumothorax. No consolidation. No pleural effusion.  Heart/Mediastinum: No cardiomegaly. Mild uncoiling of the aorta.  Bones/Soft tissues: No acute osseous abnormality. Surgical clips in region of GE junction.  IMPRESSION:  Minimal basilar atelectasis. CT chest has increased sensitivity for metastatic disease.   Signature Line *** Final ***  Electronically Signed By:  Kwadwo Hare on  2019 13:26  Dictated by:  Kwadwo Hare  She had a s/p robotic L partial nephrectomy showing papillary RCC in 2018.   She had neurological issues and she saw Dr. Bazzi and not noted to have brain tumor, noted to have hemorrhorage, she has vascular abnormality. He referred to Dr Looney. She had a cath and looked at flow and saw an anomaly. Says also told re radiation. Nothing done and she says to return prn to see Dr Looney.  She has not seen him again and no other issues.  She has not done the covid 19 vaccine and says she does not feel needs it. We discussed this.  Prior labs  na 136 and k 4.0 and alb 4.4 and tb 0.4 and alk 51 and ast 14 and alt 12 and wbc 9.0 and hg 12.5 and plat 282.afp 5.3 normal and hcv pcr negative.  2019 u/s:  Document Type: US Abdomen Complete Document Date: 2019 09:52  Document Status: Auth (Verified) Document Title: US Abdomen Complete Performed By: Paxton Ray  Verified By: Kalpesh Cisneros on 2019 10:31  Encounter [facility]: [id number], [facility], 3/12/2019 - 3/12/2019  * Final Report *  Reason For Exam HEPATITIS C  REPORT EXAM: US Abdomen Complete, US Abdomen Doppler Complete  CLINICAL INDICATION: HEPATITIS C. elevated AFP; Port HTN; left renal cell carcinoma  TECHNIQUE: Grayscale, pulsed wave and color Doppler sonography of the upper abdomen were performed. ESRC.[id number]  COMPARISON: Abdominal ultrasound 2018  FINDINGS:  Liver: Coarsened echotexture. Echogenic shadowing focus within the anterior right hepatic lobe measuring 6 mm likely representing a small calcification  Bile Ducts: No dilated intrahepatic biliary radicles. Common duct measures 2 mm.  Gallbladder: Normal. Cornelius's sign is negative.  Pancreas: Normal.  Right Kidney: Measures 10.6 cm. Normal echogenicity. No hydronephrosis.  Left Kidney: Measures 10.9 cm. Normal echogenicity. No hydronephrosis.  Spleen: Measures 10.3 cm. Punctate splenic calcifications, likely the sequela of old granulomatous disease.  Aorta: Normal proximally, not imaged distally.   IVC: Normal proximally, not imaged distally.   Hepatic Veins: Normal.  Portal Veins: Hepatopetal flow. 17-25 cm/s.  Hepatic Arteries: Peak systolic velocity 37 cm/s. Resistive index 0.6.  Other: None.  IMPRESSION: 1. Coarsened hepatic echotexture with increased echogenicity consistent with chronic liver disease/hepatic steatosis. No suspicious liver lesions. Patent hepatic vasculature with normal direction of flow. 2. Punctate calcifications within the liver and spleen, likely the sequela of old granulomatous disease.  The images were reviewed and interpreted by Kalpesh Cisneros MD.  Signature Line *** Final ***  Electronically Signed By:  Kalpesh Cisneros on  2019 10:31  Dictated by:  Paxton Ray   IR report showed Nov 15 2018 grade 2 medial parietal avm.  18 labs gluc 91 cr 0.96 alp 56 tb 0.5 ast 16 alt 11 wbc 7 hgb 12.6 plt 304 apf 5 hep c negative  2018 u/s: * Final Report *  Reason For Exam Hepatitis C, chronic  REPORT EXAM: US Abdomen Doppler Complete, US Abdomen Complete  CLINICAL INDICATION: Hepatitis C, chronic. partial L nephrectomy  TECHNIQUE: Grayscale, pulsed wave and color Doppler sonography of the upper abdomen were performed.  COMPARISON: 2017  FINDINGS:  Liver: Mildly increased echogenicity and nodular contour. No lesions.  Bile Ducts: No dilated intrahepatic biliary radicles. Common duct measures 4 mm.  Gallbladder: Normal. Cornelius's sign is negative.  Pancreas: Obscured by overlying structures.  Right Kidney: Measures 10.7 cm. Normal echogenicity. No hydronephrosis.  Left Kidney: Measures 9.4 cm. Normal echogenicity. No hydronephrosis.  Spleen: Measures 11.8 cm.  Aorta: Normal.  IVC: Normal.  Hepatic Veins: Normal.  Portal Veins: Hepatopedal flow. 23 cm/s.  Hepatic Arteries: Peak systolic velocity 28 cm/s. Resistive index 0.7.  Other: None.  IMPRESSION: 1. Echogenic liver with nodular contour suggestive of hepatic steatosis/chronic liver disease. 2. Patent hepatic vasculature.  Signature Line *** Final ***  Electronically Signed By:  Kalpesh Cisneros on  2018 10:40  2018 ct c/a/p no acute findings, colonic diverticulosis, atherosclerosis, granulomatous dz noted in liver. post surgical changes suggested involvoing the left kidney  ct chead 2018 acute parenchymal hemorrhage in right parietal lobe  18 kidney path:  KIDNEY, LEFT, MASS, ROBOTIC PARTIAL NEPHRECTOMY: Renal cell carcinoma, papillary type 1, greatest carcinoma dimension 4.4 cm Carcinoma is limited to the kidney Pedro nuclear grade G2 Clear cell change in approximately 10% tumor, no sarcomatoid change identified Carcinoma at inked parenchymal margin of excision (tumor grossly perforated) Uninvolved renal parenchyma with chronic interstitial nephritis  LYMPH NODES, PARA-AORTIC, EXCISION: No metastatic carcinoma identified in three lymph nodes (0/3) Focus of endosalpingiosis in one lymph node  SOFT TISSUE, UMM-TUMOR FAT, EXCISION: Benign fibroadipose tissue No carcinoma identified   She did colon with dr Mcgee and do in 5 yrs .  Plan: 1. Mri in Nov  to check on perfusion change. 2. Pt will do labs in Nov. 3. Follow with primary md for her sugars.   Stressed to pt the need for social distancing and strict handwashing and wearing a mask and to follow any other new or added CDC recommendations as this is an evolving target.  Duration of visit 31 mins via [employer] video failed with over 50% of the time explaining pt's condition and treatment plan with the pt.

## 2021-11-01 ENCOUNTER — LAB OUTSIDE AN ENCOUNTER (OUTPATIENT)
Dept: URBAN - METROPOLITAN AREA TELEHEALTH 2 | Facility: TELEHEALTH | Age: 66
End: 2021-11-01

## 2021-11-01 ENCOUNTER — TELEPHONE ENCOUNTER (OUTPATIENT)
Dept: URBAN - METROPOLITAN AREA CLINIC 92 | Facility: CLINIC | Age: 66
End: 2021-11-01

## 2021-11-01 NOTE — HPI-TODAY'S VISIT:
Deabraydon White,  November 1 MRI back. Lower thorax shows median sternotomy wires. Liver showed fat deposition and redemonstration of mild surface nodularity. No fat quant given. There are some perfusion abnormalities that are seen throughout the liver and that requires a 6-month follow-up suurveillance of this. Gallbladder and biliary tree appeared to be normal. Spleen was normal. Pancreas was normal. Subcentimeter left simple cyst seen in the kidney.  Status post left partial nephrectomy noted.  No evidence of local recurrence seen. Lymph nodes normal. Liver vessels normal. Retroperitoneum reported to be normal. No aggressive osseous lesions and postsurgical changes seen in the ventral abdominal midline. Overall they saw postsurgical changes of partial left nephrectomy 11th recurrent or metastatic disease seen.  The liver is fatty appearing but they did not state by how much fat percentage. Sometimes technically they are not able to do that measurement. Dr. Lomeli

## 2021-11-09 ENCOUNTER — TELEPHONE ENCOUNTER (OUTPATIENT)
Dept: URBAN - METROPOLITAN AREA CLINIC 92 | Facility: CLINIC | Age: 66
End: 2021-11-09

## 2021-11-09 LAB
A/G RATIO: 1.3
ALBUMIN: 4.5
ALKALINE PHOSPHATASE: 67
ALT (SGPT): 17
AST (SGOT): 21
BASO (ABSOLUTE): 0.1
BASOS: 1
BILIRUBIN, TOTAL: 0.3
BUN/CREATININE RATIO: 18
BUN: 19
CALCIUM: 9.3
CARBON DIOXIDE, TOTAL: 22
CHLORIDE: 99
CREATININE: 1.08
EGFR IF AFRICN AM: 61
EGFR IF NONAFRICN AM: 53
EOS (ABSOLUTE): 0.2
EOS: 2
GLOBULIN, TOTAL: 3.6
GLUCOSE: 106
HCV LOG10: (no result)
HEMATOCRIT: 36.9
HEMATOLOGY COMMENTS:: (no result)
HEMOGLOBIN: 12.3
HEPATITIS C QUANTITATION: <12
IMMATURE CELLS: (no result)
IMMATURE GRANS (ABS): 0
IMMATURE GRANULOCYTES: 0
INR: 0.9
LYMPHS (ABSOLUTE): 2.2
LYMPHS: 23
MCH: 30.4
MCHC: 33.3
MCV: 91
MONOCYTES(ABSOLUTE): 0.6
MONOCYTES: 6
NEUTROPHILS (ABSOLUTE): 6.3
NEUTROPHILS: 68
NRBC: (no result)
PLATELETS: 336
POTASSIUM: 4.4
PROTEIN, TOTAL: 8.1
PROTHROMBIN TIME: 9.9
RBC: 4.04
RDW: 13.1
SODIUM: 136
TEST INFORMATION:: (no result)
WBC: 9.5

## 2021-11-09 NOTE — HPI-TODAY'S VISIT:
Dear Mary White, November labs show white cell count 9.5 hemoglobin 12.3 platelet count 336 MCV 91 neutrophils 6.3 lymphocytes 2.2.  These are all normal range.   Glucose slightly up at 106 previously was 82.  Maybe you were not fasting this day? Share with local provider. BUN of 19 creatinine 1.08 which is slightly up and previously was 1.03 but prior also was 1.05 last year. Sodium 136 potassium 4.4 calcium 9.3 albumin 4.5 bilirubin 0.3 alkaline phosphatase 67 AST 21 ALT 17.  Previously AST 24 and ALT 20. Hep C PCR less than 12 and not detected. INR normal at 0.9. Meld low at 7 and meld na 7. Dr Lomeli

## 2021-11-30 ENCOUNTER — OFFICE VISIT (OUTPATIENT)
Dept: URBAN - METROPOLITAN AREA TELEHEALTH 2 | Facility: TELEHEALTH | Age: 66
End: 2021-11-30
Payer: MEDICARE

## 2021-11-30 DIAGNOSIS — B18.2 CHRONIC ACTIVE HEPATITIS C: ICD-10-CM

## 2021-11-30 DIAGNOSIS — K76.9 LIVER LESION: ICD-10-CM

## 2021-11-30 DIAGNOSIS — Z79.899 HIGH RISK MEDICATION USE: ICD-10-CM

## 2021-11-30 DIAGNOSIS — K74.00 HEPATIC FIBROSIS: ICD-10-CM

## 2021-11-30 PROCEDURE — 99214 OFFICE O/P EST MOD 30 MIN: CPT

## 2021-11-30 RX ORDER — ESTRADIOL 1 MG/1
TAKE 1 TABLET (1 MG) BY ORAL ROUTE ONCE DAILY TABLET ORAL 1
Qty: 0 | Refills: 0 | Status: ACTIVE | COMMUNITY
Start: 1900-01-01

## 2021-11-30 RX ORDER — ASCORBIC ACID 125 MG
AS DIRECTED TABLET,CHEWABLE ORAL
Status: ACTIVE | COMMUNITY

## 2021-11-30 RX ORDER — UBIDECARENONE/VIT E ACET 100MG-5
1 CAPSULE CAPSULE ORAL ONCE A DAY
Status: ACTIVE | COMMUNITY

## 2021-11-30 RX ORDER — COMPOUNDING SYRUP VEHICLE 1 G/ML
AS DIRECTED SYRUP ORAL
Status: ACTIVE | COMMUNITY

## 2021-11-30 RX ORDER — LEVOTHYROXINE SODIUM 100 UG/1
TAKE 1 TABLET (100 MCG) BY ORAL ROUTE ONCE DAILY TABLET ORAL 1
Qty: 0 | Refills: 0 | Status: ACTIVE | COMMUNITY
Start: 1900-01-01

## 2021-11-30 RX ORDER — ENALAPRIL MALEATE AND HYDROCHLOROTHIAZIDE 10; 25 MG/1; MG/1
TAKE 1 TABLET BY ORAL ROUTE ONCE DAILY TABLET ORAL 1
Qty: 0 | Refills: 0 | Status: ACTIVE | COMMUNITY
Start: 1900-01-01

## 2021-11-30 RX ORDER — DILTIAZEM HYDROCHLORIDE 240 MG/1
TAKE 1 CAPSULE (240 MG) BY ORAL ROUTE ONCE DAILY CAPSULE, COATED, EXTENDED RELEASE ORAL 1
Qty: 0 | Refills: 0 | Status: ACTIVE | COMMUNITY
Start: 1900-01-01

## 2021-11-30 RX ORDER — MAGNESIUM OXIDE 400 MG/1
1 TABLET AS NEEDED TABLET ORAL ONCE A DAY
Status: ACTIVE | COMMUNITY

## 2021-11-30 NOTE — HPI-TODAY'S VISIT:
This is a scheduled follow-up appointment for this patient, a 66 year old /White female, after a previous visit May 2021  for an evaluation for hepatitis C and on treatment evaluation of her newly diagnosed cirrhosis by fibroscan.   A copy of the note john be sent to the referring provider.   Chart review:  November 2021 labs show white cell count 9.5 hemoglobin 12.3 platelet count 336 MCV 91 neutrophils 6.3 lymphocytes 2.2.  These are all normal range.   Glucose slightly up at 106 previously was 82.  Maybe not fasting this day but she says it was and so may want to share with local provider. BUN of 19 creatinine 1.08 which is slightly up and previously was 1.03 but prior also was 1.05 last year. Sodium 136 potassium 4.4 calcium 9.3 albumin 4.5 bilirubin 0.3 alkaline phosphatase 67 AST 21 ALT 17.  Previously AST 24 and ALT 20. Hep C PCR less than 12 and not detected. INR normal at 0.9. Meld low at 7 and meld na 7.  She says on cardizem cd and enalapril 10/25mg for her bp.  November 1 MRI back. Lower thorax shows median sternotomy wires. Liver showed fat deposition and redemonstration of mild surface nodularity. No fat quant given. There are some perfusion abnormalities that are seen throughout the liver and that requires a 6-month follow-up surveillance of this. Gallbladder and biliary tree appeared to be normal. Spleen was normal. Pancreas was normal. Subcentimeter left simple cyst seen in the kidney.  Status post left partial nephrectomy noted.  No evidence of local recurrence seen. Lymph nodes normal. Liver vessels normal. Retroperitoneum reported to be normal. No aggressive osseous lesions and postsurgical changes seen in the ventral abdominal midline. Overall they saw postsurgical changes of partial left nephrectomy with no recurrent or metastatic disease seen.  The liver is fatty appearing but they did not state by how much fat percentage. Sometimes technically they are not able to do that measurement.   May 11: hcv pcr less than 12 and so great to see. Ast 24 and alt 20 and alk 63 and tb 0.3 and ca 9.5 and na 140 and k 4.3 and cr 1.03 and bun 13. Glu 82. Wbc 9.5 and hg 12.9 and plat 363. Mcv 91.  Prior cr 1.05 and so little lower now 1.03.   May 11 2021 MRI shows the liver to be fatty at 15 to 16%.  Desired is less than 6%.  Minimal surface nodularity still seen in the liver but without definite findings of cirrhosis. Redemonstrated 6 mm segment to hyperechoic lesion seen which they feel is a perfusion anomaly.  We should check on that in 6 months. Gallbladder biliary tree were normal.  Spleen normal.  Pancreas normal.  Postsurgical changes seen of partial left nephrectomy but without evidence of recurrence seen. Redemonstrated subcentimeter left renal cyst seen. They mention that you have an anatomic variant of the replaced left hepatic artery arising from the left gastric artery.  Also accessory or replaced right hepatic artery arising from the superior mesenteric artery. Postsurgical changes also seen along the anterior abdominal wall. Overall they found no evidence of recurrent or metastatic disease within the abdomen from the previous left partial nephrectomy and the liver was fatty at 15 to 16% fat.  Nov 2020 10 and inr 0.9 and hcv  less than 12. ast 18 and alt 16 so at ideal. Tb 0.2 and alk 66. na 137 and k 4.2 and cl 99 and co2 24 and bun 17 and cr 1.05 slightly up and glu 122 elevated and maybe not fasting and show local md. wbc 8.7 hg 12.1 plat 338. mcv 93.    Dr Sanderson: usuaully sees late in year and he will see tosay and prior saw in Nov 2020.  Nov 13 2020 na 1398 and k 3.5 and cl 103 and bun 18 and cr 1.30 and glu 131 and alb 4.2 and tb 0.3 and ast 17 and alt 15 and alk 56 and wbc 10.3 and hg 12.6 plat 329.    		 Document Type:	MRI Abdomen w/ + w/o Contrast Document Date:	November 10, 2020 11:36  Document Status:	Auth (Verified) Document Title:	MRI Abdomen w/ + w/o Contrast Performed By:	Juanpablo Reza  Verified By:	Juanpablo Reza on November 10, 2020 13:26  Encounter info:	[id number], [facility], Single Visit OP, 11/10/2020 - 11/10/2020   * Final Report *  Reason For Exam surveillance; left robotic partial nephrectomy 4/2018 for a stage I papillary renal cell carcinoma, pt has abdominal aneurysm;Malignant neoplasm  REPORT EXAM: MRI Abdomen w/ + w/o Contrast  CLINICAL INDICATION: Surveillance status post left robotic partial for papillary renal cell carcinoma.   TECHNIQUE: Multisequence, multiplanar MRI of the abdomen was performed without and with intravenous contrast. ESRC.[id number]   CONTRAST: 19 cc of Prohance  COMPARISON: 11/6/2019  FINDINGS:   Lower Thorax: Normal.  Liver: Moderate diffuse hepatic steatosis. Unchanged 1 cm arterially hyperenhancing lesion within segment 2 without correlate on other sequences, likely a perfusional anomaly (9:34). No suspicious hepatic lesion.  Gallbladder/Biliary Tree: Normal.  Spleen: Normal.  Pancreas: Normal.  Adrenal Glands: Normal.   Kidneys/Ureters: Prior partial left nephrectomy without locally recurrent disease. Multiple bilateral renal cysts. No hydronephrosis.  Gastrointestinal: Normal.   Lymph Nodes: Normal.  Vessels: Nonaneurysmal aorta. Replaced right hepatic artery arises from the SMA.  Peritoneum/Retroperitoneum: Normal.  Bones/Soft Tissues: Postsurgical changes within anterior abdominal wall. Multilevel spinal degenerative changes. No aggressive bone lesions.   IMPRESSION: Prior left partial nephrectomy without locally recurrent or metastatic disease within the abdomen.  Signature Line *** Final ***  Electronically Signed By:  Juanpablo Reza on  11/10/2020 13:26  Dictated by:  Juanpablo Reza    Nov 7 2019 glu 98 and cr 1.11 slightly up and na 138 and k 4.4 and cl 100 and total protein 8.2 slightly up. alb 4.6 and tb 0.4 and alk 61 and ast 18 and alt 17 wbc 9.5 and hg 12.9 and plat 335.  HCV pcr neg.  mri 2019:   Document Type: MRI Abdomen w/ + w/o Contrast Document Date: November 06, 2019 10:37  Document Status: Auth (Verified) Document Title: MRI Abdomen w/ + w/o Contrast Performed By: Geoff Farah IV  Verified By: Geoff Farah IV on November 06, 2019 11:23  Encounter info: [id number], [facility], Single Visit OP, 11/6/2019 - 11/6/2019   * Final Report *  Reason For Exam hx of renal cancer  REPORT EXAM: MRI Abdomen w/ + w/o Contrast  CLINICAL INDICATION: hx of renal cancer. Per prior reports, history of hepatitis C.  TECHNIQUE: Multisequence, multiplanar MRI of the abdomen was performed without and with intravenous contrast. ESRC.[id number]   CONTRAST: 19 cc of Prohance  COMPARISON: Contrast MRI November 5, 2018, abdominal ultrasound March 12, 2019  FINDINGS:  Lower Thorax: Normal.  Liver: Marked out of phase signal drop. Normal hepatic morphology. Subtle small focus of arterial hyperenhancement in segment 2 (11:34) is less prominent, also seen on venous phase but not no washout on delayed, no T2 or diffusion signal, felt to be perfusional. No significant lesions. Vessels are patent.   Gallbladder/Biliary Tree: Normal.  Spleen: Normal size.  Pancreas: Normal.  Adrenal Glands: Normal.   Kidneys/Ureters: There are a few small bilateral cysts. Partial nephrectomy defect in the anterior mid left kidney is stable. No enhancing lesions.  Gastrointestinal: Normal.   Lymph Nodes: Normal.  Vessels: No varices. Incidental replaced right hepatic artery.  Peritoneum/Retroperitoneum: No ascites.  Bones/Soft Tissues: Normal.  IMPRESSION:      1. There is marked hepatic steatosis. Morphologically normal liver. No suspicious lesions. No stigmata of portal hypertension. 2. Stable postsurgical defect in the left kidney with no enhancing lesions.   Signature Line *** Final ***  Electronically Signed By:  Geoff Farah IV on  11/06/2019 11:23  Dictated by:  Geoff Farah IV    Document Type: XR Chest 2 Views PA + Lat Stnd Protocol Document Date: November 06, 2019 11:08  Document Status: Auth (Verified) Document Title: XR Chest 2 Views PA + Lat Stnd Protocol Performed By: Kwadwo Hare  Verified By: Kwadwo Hare on November 06, 2019 13:26  Encounter info: [id number], [facility], Single Visit OP, 11/6/2019 - 11/6/2019   * Final Report *  Reason For Exam Hx of renal cell cancer  REPORT EXAM: XR Chest 2 Views PA + Lat Stnd Protocol  CLINICAL INDICATION: Hx of renal cell cancer  ESRC.[id number]  COMPARISON: None   FINDINGS:  Support Devices: None   Lungs/Pleura: No pneumothorax. No consolidation. No pleural effusion.  Heart/Mediastinum: No cardiomegaly. Mild uncoiling of the aorta.  Bones/Soft tissues: No acute osseous abnormality. Surgical clips in region of GE junction.  IMPRESSION:  Minimal basilar atelectasis. CT chest has increased sensitivity for metastatic disease.   Signature Line *** Final ***  Electronically Signed By:  Kwadwo Hare on  11/06/2019 13:26  Dictated by:  Kwadwo Hare  She had a s/p robotic L partial nephrectomy showing papillary RCC in April 2018.   She had neurological issues and she saw Dr. Bazzi and not noted to have brain tumor, noted to have hemorrhorage, she has vascular abnormality. He referred to Dr Looney. She had a cath and looked at flow and saw an anomaly. Says also told re radiation. Nothing done and she says to return prn to see Dr Looney.    She has not seen him or anyone for this again and no other issues.  She has not done the covid 19 vaccine and says she does not feel needs it still.   did get one of them. New variant coming out and she is not protected.   Prior labs 2019 na 136 and k 4.0 and alb 4.4 and tb 0.4 and alk 51 and ast 14 and alt 12 and wbc 9.0 and hg 12.5 and plat 282.afp 5.3 normal and hcv pcr negative.  March 2019 u/s:  Document Type: US Abdomen Complete Document Date: March 12, 2019 09:52  Document Status: Auth (Verified) Document Title: US Abdomen Complete Performed By: Paxton Ray  Verified By: Kalpesh Cisneros on March 12, 2019 10:31  Encounter info: [id number], [facility], Single Visit OP, 3/12/2019 - 3/12/2019  * Final Report *  Reason For Exam HEPATITIS C  REPORT EXAM: US Abdomen Complete, US Abdomen Doppler Complete  CLINICAL INDICATION: HEPATITIS C. elevated AFP; Port HTN; left renal cell carcinoma  TECHNIQUE: Grayscale, pulsed wave and color Doppler sonography of the upper abdomen were performed. ESRC.[id number]  COMPARISON: Abdominal ultrasound 7/7/2018  FINDINGS:  Liver: Coarsened echotexture. Echogenic shadowing focus within the anterior right hepatic lobe measuring 6 mm likely representing a small calcification  Bile Ducts: No dilated intrahepatic biliary radicles. Common duct measures 2 mm.  Gallbladder: Normal. Cornelius's sign is negative.  Pancreas: Normal.  Right Kidney: Measures 10.6 cm. Normal echogenicity. No hydronephrosis.  Left Kidney: Measures 10.9 cm. Normal echogenicity. No hydronephrosis.  Spleen: Measures 10.3 cm. Punctate splenic calcifications, likely the sequela of old granulomatous disease.  Aorta: Normal proximally, not imaged distally.   IVC: Normal proximally, not imaged distally.   Hepatic Veins: Normal.  Portal Veins: Hepatopetal flow. 17-25 cm/s.  Hepatic Arteries: Peak systolic velocity 37 cm/s. Resistive index 0.6.  Other: None.  IMPRESSION: 1. Coarsened hepatic echotexture with increased echogenicity consistent with chronic liver disease/hepatic steatosis. No suspicious liver lesions. Patent hepatic vasculature with normal direction of flow. 2. Punctate calcifications within the liver and spleen, likely the sequela of old granulomatous disease.  The images were reviewed and interpreted by Kalpesh Cisneros MD.  Signature Line *** Final ***  Electronically Signed By:  Kalpesh Cisneros on  03/12/2019 10:31  Dictated by:  Paxton Ray   IR report showed Nov 15 2018 grade 2 medial parietal avm.  9/11/18 labs gluc 91 cr 0.96 alp 56 tb 0.5 ast 16 alt 11 wbc 7 hgb 12.6 plt 304 apf 5 hep c negative  june 2018 u/s: * Final Report *  Reason For Exam Hepatitis C, chronic  REPORT EXAM: US Abdomen Doppler Complete, US Abdomen Complete  CLINICAL INDICATION: Hepatitis C, chronic. partial L nephrectomy  TECHNIQUE: Grayscale, pulsed wave and color Doppler sonography of the upper abdomen were performed.  COMPARISON: December 12, 2017  FINDINGS:  Liver: Mildly increased echogenicity and nodular contour. No lesions.  Bile Ducts: No dilated intrahepatic biliary radicles. Common duct measures 4 mm.  Gallbladder: Normal. Cornelius's sign is negative.  Pancreas: Obscured by overlying structures.  Right Kidney: Measures 10.7 cm. Normal echogenicity. No hydronephrosis.  Left Kidney: Measures 9.4 cm. Normal echogenicity. No hydronephrosis.  Spleen: Measures 11.8 cm.  Aorta: Normal.  IVC: Normal.  Hepatic Veins: Normal.  Portal Veins: Hepatopedal flow. 23 cm/s.  Hepatic Arteries: Peak systolic velocity 28 cm/s. Resistive index 0.7.  Other: None.  IMPRESSION: 1. Echogenic liver with nodular contour suggestive of hepatic steatosis/chronic liver disease. 2. Patent hepatic vasculature.  Signature Line *** Final ***  Electronically Signed By:  Kalpesh Cisneros on  06/07/2018 10:40  july 2018 ct c/a/p no acute findings, colonic diverticulosis, atherosclerosis, granulomatous dz noted in liver. post surgical changes suggested involvoing the left kidney  ct chead july 2018 acute parenchymal hemorrhage in right parietal lobe  4/16/18 kidney path:  KIDNEY, LEFT, MASS, ROBOTIC PARTIAL NEPHRECTOMY: Renal cell carcinoma, papillary type 1, greatest carcinoma dimension 4.4 cm Carcinoma is limited to the kidney Pedro nuclear grade G2 Clear cell change in approximately 10% tumor, no sarcomatoid change identified Carcinoma at inked parenchymal margin of excision (tumor grossly perforated) Uninvolved renal parenchyma with chronic interstitial nephritis  LYMPH NODES, PARA-AORTIC, EXCISION: No metastatic carcinoma identified in three lymph nodes (0/3) Focus of endosalpingiosis in one lymph node  SOFT TISSUE, UMM-TUMOR FAT, EXCISION: Benign fibroadipose tissue No carcinoma identified   She did colon with dr Mcgee and do in 5 yrs 2021.  Plan: 1. Mri in May 2021  to check on perfusion change. 2. She wants to see renal re the cr rise. 3. She will do labs in may 2021.    Stressed to pt the need for social distancing and strict handwashing and wearing a mask and to follow any other new or added CDC recommendations as this is an evolving target.  Duration of visit  38 mins with 5 min of prep and then 33 min via doximity video with over 50% of the time explaining pt's condition and treatment plan with the pt.

## 2022-01-19 ENCOUNTER — APPOINTMENT (RX ONLY)
Dept: URBAN - METROPOLITAN AREA OTHER 10 | Facility: OTHER | Age: 67
Setting detail: DERMATOLOGY
End: 2022-01-19

## 2022-01-19 DIAGNOSIS — L82.1 OTHER SEBORRHEIC KERATOSIS: ICD-10-CM

## 2022-01-19 DIAGNOSIS — L81.4 OTHER MELANIN HYPERPIGMENTATION: ICD-10-CM

## 2022-01-19 DIAGNOSIS — L259 CONTACT DERMATITIS AND OTHER ECZEMA, UNSPECIFIED CAUSE: ICD-10-CM | Status: INADEQUATELY CONTROLLED

## 2022-01-19 PROBLEM — L30.8 OTHER SPECIFIED DERMATITIS: Status: ACTIVE | Noted: 2022-01-19

## 2022-01-19 PROCEDURE — ? SUNSCREEN RECOMMENDATIONS

## 2022-01-19 PROCEDURE — ? BENIGN DESTRUCTION COSMETIC

## 2022-01-19 PROCEDURE — ? PRESCRIPTION MEDICATION MANAGEMENT

## 2022-01-19 PROCEDURE — ? COUNSELING

## 2022-01-19 PROCEDURE — 99204 OFFICE O/P NEW MOD 45 MIN: CPT

## 2022-01-19 PROCEDURE — ? PRESCRIPTION

## 2022-01-19 RX ORDER — TRIAMCINOLONE ACETONIDE 1 MG/G
CREAM TOPICAL
Qty: 454 | Refills: 1 | Status: ERX | COMMUNITY
Start: 2022-01-19

## 2022-01-19 RX ADMIN — TRIAMCINOLONE ACETONIDE: 1 CREAM TOPICAL at 00:00

## 2022-01-19 ASSESSMENT — LOCATION DETAILED DESCRIPTION DERM
LOCATION DETAILED: MIDDLE STERNUM
LOCATION DETAILED: RIGHT ANTERIOR PROXIMAL THIGH
LOCATION DETAILED: RIGHT SUPERIOR HELIX
LOCATION DETAILED: RIGHT ANTERIOR DISTAL THIGH
LOCATION DETAILED: LEFT ANTERIOR PROXIMAL THIGH
LOCATION DETAILED: UPPER STERNUM

## 2022-01-19 ASSESSMENT — LOCATION SIMPLE DESCRIPTION DERM
LOCATION SIMPLE: CHEST
LOCATION SIMPLE: LEFT THIGH
LOCATION SIMPLE: RIGHT EAR
LOCATION SIMPLE: RIGHT THIGH

## 2022-01-19 ASSESSMENT — LOCATION ZONE DERM
LOCATION ZONE: EAR
LOCATION ZONE: TRUNK
LOCATION ZONE: LEG

## 2022-01-19 NOTE — PROCEDURE: PRESCRIPTION MEDICATION MANAGEMENT
Initiate Treatment: Triamcinolone cream apply to chest and legs twice daily x 2 weeks with moisturizer on top
Detail Level: Zone
Render In Strict Bullet Format?: No

## 2022-02-22 ENCOUNTER — APPOINTMENT (RX ONLY)
Dept: URBAN - METROPOLITAN AREA OTHER 10 | Facility: OTHER | Age: 67
Setting detail: DERMATOLOGY
End: 2022-02-22

## 2022-02-22 DIAGNOSIS — L259 CONTACT DERMATITIS AND OTHER ECZEMA, UNSPECIFIED CAUSE: ICD-10-CM | Status: STABLE

## 2022-02-22 DIAGNOSIS — L57.0 ACTINIC KERATOSIS: ICD-10-CM | Status: RESOLVED

## 2022-02-22 DIAGNOSIS — L82.1 OTHER SEBORRHEIC KERATOSIS: ICD-10-CM

## 2022-02-22 PROBLEM — L30.8 OTHER SPECIFIED DERMATITIS: Status: ACTIVE | Noted: 2022-02-22

## 2022-02-22 PROCEDURE — ? LIQUID NITROGEN

## 2022-02-22 PROCEDURE — ? OTHER

## 2022-02-22 PROCEDURE — ? COUNSELING

## 2022-02-22 PROCEDURE — 17000 DESTRUCT PREMALG LESION: CPT

## 2022-02-22 PROCEDURE — 99213 OFFICE O/P EST LOW 20 MIN: CPT | Mod: 25

## 2022-02-22 ASSESSMENT — LOCATION ZONE DERM
LOCATION ZONE: TRUNK
LOCATION ZONE: LEG

## 2022-02-22 ASSESSMENT — LOCATION DETAILED DESCRIPTION DERM
LOCATION DETAILED: RIGHT MEDIAL SUPERIOR CHEST
LOCATION DETAILED: RIGHT PROXIMAL PRETIBIAL REGION
LOCATION DETAILED: UPPER STERNUM
LOCATION DETAILED: RIGHT ANTERIOR DISTAL THIGH
LOCATION DETAILED: LEFT ANTERIOR PROXIMAL THIGH

## 2022-02-22 ASSESSMENT — LOCATION SIMPLE DESCRIPTION DERM
LOCATION SIMPLE: RIGHT THIGH
LOCATION SIMPLE: CHEST
LOCATION SIMPLE: LEFT THIGH
LOCATION SIMPLE: RIGHT PRETIBIAL REGION

## 2022-02-22 NOTE — PROCEDURE: LIQUID NITROGEN
Duration Of Freeze Thaw-Cycle (Seconds): 5
Show Applicator Variable?: Yes
Render Post-Care Instructions In Note?: no
Consent: The patient's consent was obtained including but not limited to risks of crusting, scabbing, blistering, scarring, darker or lighter pigmentary change, recurrence, incomplete removal and infection.
Detail Level: Simple
Number Of Freeze-Thaw Cycles: 2 freeze-thaw cycles
Post-Care Instructions: I reviewed with the patient in detail post-care instructions. Patient is to wear sunprotection, and avoid picking at any of the treated lesions. Pt may apply Vaseline to crusted or scabbing areas.

## 2022-02-22 NOTE — PROCEDURE: OTHER
Detail Level: Zone
Render Risk Assessment In Note?: no
Other (Free Text): Patient states she applied Triamcinolone and now has heart palpitations and shortness of breath. Patient to continue moisturizing diligently daily
Note Text (......Xxx Chief Complaint.): This diagnosis correlates with the

## 2022-05-09 ENCOUNTER — LAB OUTSIDE AN ENCOUNTER (OUTPATIENT)
Dept: URBAN - METROPOLITAN AREA TELEHEALTH 2 | Facility: TELEHEALTH | Age: 67
End: 2022-05-09

## 2022-05-23 ENCOUNTER — LAB OUTSIDE AN ENCOUNTER (OUTPATIENT)
Dept: URBAN - METROPOLITAN AREA TELEHEALTH 2 | Facility: TELEHEALTH | Age: 67
End: 2022-05-23

## 2022-05-23 ENCOUNTER — TELEPHONE ENCOUNTER (OUTPATIENT)
Dept: URBAN - METROPOLITAN AREA CLINIC 92 | Facility: CLINIC | Age: 67
End: 2022-05-23

## 2022-05-23 NOTE — HPI-TODAY'S VISIT:
Dear Mary White, May 23 MRI released to me. Lower thorax was normal. Liver  remains fatty with the liver being at 13.4% but it is slightly lower than 15 to 16% back in May 2021.  Ideal fat is less than 6% so we need to keep working on that. They see a lobular hepatic contour with subtle nodular enhancement without overt morphologic changes of chronic liver disease. Redemonstrated 6 mm segment 2 internal enhancing focus seen and they felt as a perfusion anomaly.  Another nonspecific 6 mm enhancing and diffusion restriction nodule seen in the capsule stable since November 2020.  We will plan to do another MRI in 6 months to look at these. Gallbladder/biliary tree normal. Spleen, pancreas, and adrenals normal. Postsurgical changes seen of the partial left nephrectomy without evidence of recurrence.  Redemonstrated left renal cyst seen. Small periportal, mesenteric and retroperitoneal lymph nodes seen which are stable since November 2020. Replaced left hepatic artery seen arising from the left gastric artery.  This is an anatomic variant.  Accessory replaced right hepatic artery seen arising from the superior mesenteric artery as well which is again another anatomic variant. Some degenerative changes seen of the spine. Overall the plan will be to do this mri again in 6 months. Dr. Lomeli

## 2022-05-29 ENCOUNTER — TELEPHONE ENCOUNTER (OUTPATIENT)
Dept: URBAN - METROPOLITAN AREA CLINIC 92 | Facility: CLINIC | Age: 67
End: 2022-05-29

## 2022-05-29 LAB
A/G RATIO: 1.6
AFP, SERUM, TUMOR MARKER: 3
ALBUMIN: 4.5
ALKALINE PHOSPHATASE: 66
ALT (SGPT): 14
AST (SGOT): 16
BASO (ABSOLUTE): 0.1
BASOS: 1
BILIRUBIN, TOTAL: 0.2
BUN/CREATININE RATIO: 13
BUN: 13
CALCIUM: 9.4
CARBON DIOXIDE, TOTAL: 19
CHLORIDE: 99
CREATININE: 1
EGFR: 61
EOS (ABSOLUTE): 0.2
EOS: 3
GLOBULIN, TOTAL: 2.8
GLUCOSE: 88
HCV LOG10: (no result)
HEMATOCRIT: 37.4
HEMATOLOGY COMMENTS:: (no result)
HEMOGLOBIN: 12.2
HEPATITIS C QUANTITATION: <12
IMMATURE CELLS: (no result)
IMMATURE GRANS (ABS): 0.1
IMMATURE GRANULOCYTES: 1
INR: 0.9
LYMPHS (ABSOLUTE): 1.9
LYMPHS: 22
MCH: 30.4
MCHC: 32.6
MCV: 93
MONOCYTES(ABSOLUTE): 0.5
MONOCYTES: 6
NEUTROPHILS (ABSOLUTE): 5.9
NEUTROPHILS: 67
NRBC: (no result)
PLATELETS: 348
POTASSIUM: 4.6
PROTEIN, TOTAL: 7.3
PROTHROMBIN TIME: 9.9
RBC: 4.01
RDW: 13.1
SODIUM: 137
TEST INFORMATION:: (no result)
WBC: 8.7

## 2022-05-29 NOTE — HPI-TODAY'S VISIT:
Dear Mary White, May 23: afp normal 3.0. Wbc 8.7 and hg 12.2 and hct 37.4 and mcv 93 and plat 348.neutrophils 5.9 and lymphs 1.9. Glu 88 and bun 13 and cr 1.00 and na 137 and k 4.6 and cl 99 and co2 19 and ca 9.4 and alb 4.5 and tb 0.2 and alk 66 and ast 16 and alt 14. ( ast 21 and alt 17 prior and ideal alt is less than 25 so doing well.) HCV pcr negative. Inr 0.9. Meld 6 and meld na 6. Doing well.   Dr Lomeli

## 2022-05-31 ENCOUNTER — OFFICE VISIT (OUTPATIENT)
Dept: URBAN - METROPOLITAN AREA TELEHEALTH 2 | Facility: TELEHEALTH | Age: 67
End: 2022-05-31
Payer: MEDICARE

## 2022-05-31 DIAGNOSIS — K76.9 LIVER LESION: ICD-10-CM

## 2022-05-31 DIAGNOSIS — C18.9 COLON CANCER: ICD-10-CM

## 2022-05-31 DIAGNOSIS — M10.09 GOUT, ARTHRITIS: ICD-10-CM

## 2022-05-31 DIAGNOSIS — Z85.528 HISTORY OF RENAL CELL CANCER: ICD-10-CM

## 2022-05-31 DIAGNOSIS — R74.8 ABNORMAL LIVER ENZYMES: ICD-10-CM

## 2022-05-31 DIAGNOSIS — K25.9 GASTRIC ULCER: ICD-10-CM

## 2022-05-31 DIAGNOSIS — G62.9 NEUROPATHY: ICD-10-CM

## 2022-05-31 DIAGNOSIS — B96.81 HELICOBACTER PYLORI (H. PYLORI) INFECTION: ICD-10-CM

## 2022-05-31 DIAGNOSIS — Z79.899 HIGH RISK MEDICATION USE: ICD-10-CM

## 2022-05-31 DIAGNOSIS — B18.2 CHRONIC ACTIVE HEPATITIS C: ICD-10-CM

## 2022-05-31 DIAGNOSIS — K74.00 HEPATIC FIBROSIS: ICD-10-CM

## 2022-05-31 DIAGNOSIS — I10 HYPERTENSION: ICD-10-CM

## 2022-05-31 DIAGNOSIS — I63.9 CVA (CEREBRAL VASCULAR ACCIDENT): ICD-10-CM

## 2022-05-31 DIAGNOSIS — E66.9 OBESITY: ICD-10-CM

## 2022-05-31 DIAGNOSIS — K44.9 HIATAL HERNIA: ICD-10-CM

## 2022-05-31 DIAGNOSIS — Z85.038 HISTORY OF COLON CANCER: ICD-10-CM

## 2022-05-31 PROCEDURE — 99214 OFFICE O/P EST MOD 30 MIN: CPT

## 2022-05-31 RX ORDER — HYDROCHLOROTHIAZIDE 12.5 MG/1
1 TABLET IN THE MORNING TABLET ORAL ONCE A DAY
Status: ACTIVE | COMMUNITY

## 2022-05-31 RX ORDER — DILTIAZEM HYDROCHLORIDE 240 MG/1
TAKE 1 CAPSULE (240 MG) BY ORAL ROUTE ONCE DAILY CAPSULE, COATED, EXTENDED RELEASE ORAL 1
Qty: 0 | Refills: 0 | Status: ACTIVE | COMMUNITY
Start: 1900-01-01

## 2022-05-31 RX ORDER — LISINOPRIL 10 MG/1
1 TABLET TABLET ORAL ONCE A DAY
Status: ACTIVE | COMMUNITY

## 2022-05-31 RX ORDER — ASCORBIC ACID 125 MG
AS DIRECTED TABLET,CHEWABLE ORAL
Status: ACTIVE | COMMUNITY

## 2022-05-31 RX ORDER — ENALAPRIL MALEATE AND HYDROCHLOROTHIAZIDE 10; 25 MG/1; MG/1
TAKE 1 TABLET BY ORAL ROUTE ONCE DAILY TABLET ORAL 1
Qty: 0 | Refills: 0 | Status: DISCONTINUED | COMMUNITY
Start: 1900-01-01

## 2022-05-31 RX ORDER — LEVOTHYROXINE SODIUM 100 UG/1
TAKE 1 TABLET (100 MCG) BY ORAL ROUTE ONCE DAILY TABLET ORAL 1
Qty: 0 | Refills: 0 | Status: ACTIVE | COMMUNITY
Start: 1900-01-01

## 2022-05-31 RX ORDER — UBIDECARENONE/VIT E ACET 100MG-5
1 CAPSULE CAPSULE ORAL ONCE A DAY
Status: ACTIVE | COMMUNITY

## 2022-05-31 RX ORDER — COMPOUNDING SYRUP VEHICLE 1 G/ML
AS DIRECTED SYRUP ORAL
Status: ACTIVE | COMMUNITY

## 2022-05-31 RX ORDER — ESTRADIOL 1 MG/1
TAKE 1 TABLET (1 MG) BY ORAL ROUTE ONCE DAILY TABLET ORAL 1
Qty: 0 | Refills: 0 | Status: ACTIVE | COMMUNITY
Start: 1900-01-01

## 2022-05-31 RX ORDER — MAGNESIUM OXIDE 400 MG/1
1 TABLET AS NEEDED TABLET ORAL ONCE A DAY
Status: ACTIVE | COMMUNITY

## 2022-05-31 NOTE — HPI-TODAY'S VISIT:
This is a scheduled follow-up appointment for this patient, a 67 year old /White female, after a previous visit Nov 2021  for an evaluation for hepatitis C and on treatment evaluation of her newly diagnosed cirrhosis by fibroscan.   A copy of the note john be sent to the referring provider.   Chart review:  May 23: afp normal 3.0. Wbc 8.7 and hg 12.2 and hct 37.4 and mcv 93 and plat 348.neutrophils 5.9 and lymphs 1.9. Glu 88 and bun 13 and cr 1.00 and na 137 and k 4.6 and cl 99 and co2 19 and ca 9.4 and alb 4.5 and tb 0.2 and alk 66 and ast 16 and alt 14. ( ast 21 and alt 17 prior and ideal alt is less than 25 so doing well.) HCV pcr negative. Inr 0.9. Meld 6 and meld na 6.  May 23 MRI released to me. Lower thorax was normal. Liver  remains fatty with the liver being at 13.4% but it is slightly lower than 15 to 16% back in May 2021.  Ideal fat is less than 6% so we need to keep working on that. They see a lobular hepatic contour with subtle nodular enhancement without overt morphologic changes of chronic liver disease. Redemonstrated 6 mm segment 2 internal enhancing focus seen with alcohol and they felt as a perfusion anomaly.  Another nonspecific 6 mm enhancing and diffusion restriction nodule seen in the capsule stable since November 2020.  We will plan to do another MRI in 6 months to look at these. Gallbladder/biliary tree normal. Spleen, pancreas, and adrenals normal. Postsurgical changes seen of the partial left nephrectomy without evidence of recurrence.  Redemonstrated left renal cyst seen. Small periportal, mesenteric and retroperitoneal lymph nodes seen which are stable since November 2020. Replaced left hepatic artery seen arising from the left gastric artery.  This is an anatomic variant.  Accessory replaced right hepatic artery seen arising from the superior mesenteric artery as well which is again another anatomic variant. Some degenerative changes seen of the spine.   November 2021 labs show white cell count 9.5 hemoglobin 12.3 platelet count 336 MCV 91 neutrophils 6.3 lymphocytes 2.2.  These are all normal range.   Glucose slightly up at 106 previously was 82.  Maybe not fasting this day but she says it was and so may want to share with local provider. BUN of 19 creatinine 1.08 which is slightly up and previously was 1.03 but prior also was 1.05 last year. Sodium 136 potassium 4.4 calcium 9.3 albumin 4.5 bilirubin 0.3 alkaline phosphatase 67 AST 21 ALT 17.  Previously AST 24 and ALT 20. Hep C PCR less than 12 and not detected. INR normal at 0.9. Meld low at 7 and meld na 7.  She says on cardizem cd and enalapril 10/25mg for her bp.  November 1 MRI back. Lower thorax shows median sternotomy wires. Liver showed fat deposition and redemonstration of mild surface nodularity. No fat quant given. There are some perfusion abnormalities that are seen throughout the liver and that requires a 6-month follow-up surveillance of this. Gallbladder and biliary tree appeared to be normal. Spleen was normal. Pancreas was normal. Subcentimeter left simple cyst seen in the kidney.  Status post left partial nephrectomy noted.  No evidence of local recurrence seen. Lymph nodes normal. Liver vessels normal. Retroperitoneum reported to be normal. No aggressive osseous lesions and postsurgical changes seen in the ventral abdominal midline. Overall they saw postsurgical changes of partial left nephrectomy with no recurrent or metastatic disease seen.  The liver is fatty appearing but they did not state by how much fat percentage. Sometimes technically they are not able to do that measurement.   May 11: hcv pcr less than 12 and so great to see. Ast 24 and alt 20 and alk 63 and tb 0.3 and ca 9.5 and na 140 and k 4.3 and cr 1.03 and bun 13. Glu 82. Wbc 9.5 and hg 12.9 and plat 363. Mcv 91.  Prior cr 1.05 and so little lower now 1.03.   May 11 2021 MRI shows the liver to be fatty at 15 to 16%.  Desired is less than 6%.  Minimal surface nodularity still seen in the liver but without definite findings of cirrhosis. Redemonstrated 6 mm segment to hyperechoic lesion seen which they feel is a perfusion anomaly.  We should check on that in 6 months. Gallbladder biliary tree were normal.  Spleen normal.  Pancreas normal.  Postsurgical changes seen of partial left nephrectomy but without evidence of recurrence seen. Redemonstrated subcentimeter left renal cyst seen. They mention that you have an anatomic variant of the replaced left hepatic artery arising from the left gastric artery.  Also accessory or replaced right hepatic artery arising from the superior mesenteric artery. Postsurgical changes also seen along the anterior abdominal wall. Overall they found no evidence of recurrent or metastatic disease within the abdomen from the previous left partial nephrectomy and the liver was fatty at 15 to 16% fat.  Nov 2020 10 and inr 0.9 and hcv  less than 12. ast 18 and alt 16 so at ideal. Tb 0.2 and alk 66. na 137 and k 4.2 and cl 99 and co2 24 and bun 17 and cr 1.05 slightly up and glu 122 elevated and maybe not fasting and show local md. wbc 8.7 hg 12.1 plat 338. mcv 93.    Dr Sanderson: usuaully sees late in year and she spoke with and assistant last year.  Nov 13 2020 na 139 and k 3.5 and cl 103 and bun 18 and cr 1.30 and glu 131 and alb 4.2 and tb 0.3 and ast 17 and alt 15 and alk 56 and wbc 10.3 and hg 12.6 plat 329.    		 Document Type:	MRI Abdomen w/ + w/o Contrast Document Date:	November 10, 2020 11:36  Document Status:	Auth (Verified) Document Title:	MRI Abdomen w/ + w/o Contrast Performed By:	Juanpablo Reza  Verified By:	Juanpablo Reza on November 10, 2020 13:26  Encounter info:	[id number], [facility], Single Visit OP, 11/10/2020 - 11/10/2020   * Final Report * IMPRESSION: Prior left partial nephrectomy without locally recurrent or metastatic disease within the abdomen.  Signature Line *** Final ***  Electronically Signed By:  Juanpablo Reza on  11/10/2020 13:26  Dictated by:  Juanpablo Reza    Nov 7 2019 glu 98 and cr 1.11 slightly up and na 138 and k 4.4 and cl 100 and total protein 8.2 slightly up. alb 4.6 and tb 0.4 and alk 61 and ast 18 and alt 17 wbc 9.5 and hg 12.9 and plat 335.  HCV pcr neg.  mri 2019:   Document Type: MRI Abdomen w/ + w/o Contrast Document Date: November 06, 2019 10:37  Document Status: Auth (Verified) Document Title: MRI Abdomen w/ + w/o Contrast Performed By: Geoff Farah IV  Verified By: Geoff Farah IV on November 06, 2019 11:23  Encounter info: [id number], [facility], Single Visit OP, 11/6/2019 - 11/6/2019 IMPRESSION:      1. There is marked hepatic steatosis. Morphologically normal liver. No suspicious lesions. No stigmata of portal hypertension. 2. Stable postsurgical defect in the left kidney with no enhancing lesions.   Signature Line *** Final ***  Electronically Signed By:  Geoff Farah IV on  11/06/2019 11:23  Dictated by:  Geoff Farah IV    Document Type: XR Chest 2 Views PA + Lat Stnd Protocol Document Date: November 06, 2019 11:08  Document Status: Auth (Verified) Document Title: XR Chest 2 Views PA + Lat Stnd Protocol Performed By: Kwadwo Hare  Verified By: Kwadwo Hare on November 06, 2019 13:26  Encounter info: [id number], [facility], Single Visit OP, 11/6/2019 - 11/6/2019 IMPRESSION:  Minimal basilar atelectasis. CT chest has increased sensitivity for metastatic disease.   Signature Line *** Final ***  Electronically Signed By:  Kwadwo Hare on  11/06/2019 13:26  Dictated by:  Kwadwo Hare  She had a s/p robotic L partial nephrectomy showing papillary RCC in April 2018.   She had neurological issues and she saw Dr. Bazzi and not noted to have brain tumor, noted to have hemorrhorage, she has vascular abnormality. He referred to Dr Looney. She had a cath and looked at flow and saw an anomaly. Says also told re radiation. Nothing done and she says to return prn to see Dr Looney.    She has not seen him or anyone for this again and no other issues.  She has not done the covid 19 vaccine and says she does not feel needs it still.   did get one of them. New variant coming out and she is not protected.   Prior labs 2019 na 136 and k 4.0 and alb 4.4 and tb 0.4 and alk 51 and ast 14 and alt 12 and wbc 9.0 and hg 12.5 and plat 282.afp 5.3 normal and hcv pcr negative.  March 2019 u/s:  Document Type: US Abdomen Complete Document Date: March 12, 2019 09:52  Document Status: Auth (Verified) Document Title: US Abdomen Complete Performed By: Paxton Ray  Verified By: Kalpesh Cisneros on March 12, 2019 10:31  Encounter info: [id number], [facility], Single Visit OP, 3/12/2019 - 3/12/2019  * Final Report *  Reason For Exam HEPATITIS C  REPORT EXAM: US Abdomen Complete, US Abdomen Doppler Complete  CLINICAL INDICATION: HEPATITIS C. elevated AFP; Port HTN; left renal cell carcinoma IMPRESSION: 1. Coarsened hepatic echotexture with increased echogenicity consistent with chronic liver disease/hepatic steatosis. No suspicious liver lesions. Patent hepatic vasculature with normal direction of flow. 2. Punctate calcifications within the liver and spleen, likely the sequela of old granulomatous disease.  The images were reviewed and interpreted by Kalpesh Cisneros MD.  Signature Line *** Final ***  Electronically Signed By:  Kalpesh Cisneros on  03/12/2019 10:31  Dictated by:  Paxton Ray   IR report showed Nov 15 2018 grade 2 medial parietal avm.  9/11/18 labs gluc 91 cr 0.96 alp 56 tb 0.5 ast 16 alt 11 wbc 7 hgb 12.6 plt 304 apf 5 hep c negative  june 2018 u/s: * Final Report *  Reason For Exam Hepatitis C, chronic  REPORT EXAM: US Abdomen Doppler Complete, US Abdomen Complete  CLINICAL INDICATION: Hepatitis C, chronic. partial L nephrectomy  TECHNIQUE: Grayscale, pulsed wave and color Doppler sonography of the upper abdomen were performed.  COMPARISON: December 12, 2017  FINDINGS:  IMPRESSION: 1. Echogenic liver with nodular contour suggestive of hepatic steatosis/chronic liver disease. 2. Patent hepatic vasculature.  Signature Line *** Final ***  Electronically Signed By:  Kalpesh Cisneros on  06/07/2018 10:40  july 2018 ct c/a/p no acute findings, colonic diverticulosis, atherosclerosis, granulomatous dz noted in liver. post surgical changes suggested involvoing the left kidney  ct chead july 2018 acute parenchymal hemorrhage in right parietal lobe  4/16/18 kidney path:  KIDNEY, LEFT, MASS, ROBOTIC PARTIAL NEPHRECTOMY: Renal cell carcinoma, papillary type 1, greatest carcinoma dimension 4.4 cm Carcinoma is limited to the kidney Pedro nuclear grade G2 Clear cell change in approximately 10% tumor, no sarcomatoid change identified Carcinoma at inked parenchymal margin of excision (tumor grossly perforated) Uninvolved renal parenchyma with chronic interstitial nephritis  LYMPH NODES, PARA-AORTIC, EXCISION: No metastatic carcinoma identified in three lymph nodes (0/3) Focus of endosalpingiosis in one lymph node  SOFT TISSUE, UMM-TUMOR FAT, EXCISION: Benign fibroadipose tissue No carcinoma identified   She did colon with dr Mcgee and do in 5 yrs 2021.  Seing Dr Shields april 2022 and he said she was stable.  Plan: 1. Mri in 6m  to check on perfusion changes. 2. Dr Shields needs to get labs. 3. Pt will see us in 6m.   Stressed to pt the need for social distancing and strict handwashing and wearing a mask and to follow any other new or added CDC recommendations as this is an evolving target.  Duration of visit 31  mins with 10 min of prep and then 21 min today via [employer] audio as dox video failed with over 50% of the time explaining pt's condition and treatment plan with the pt.

## 2022-10-31 ENCOUNTER — TELEPHONE ENCOUNTER (OUTPATIENT)
Dept: URBAN - METROPOLITAN AREA CLINIC 86 | Facility: CLINIC | Age: 67
End: 2022-10-31

## 2022-11-07 ENCOUNTER — LAB OUTSIDE AN ENCOUNTER (OUTPATIENT)
Dept: URBAN - METROPOLITAN AREA TELEHEALTH 2 | Facility: TELEHEALTH | Age: 67
End: 2022-11-07

## 2022-11-14 ENCOUNTER — TELEPHONE ENCOUNTER (OUTPATIENT)
Dept: URBAN - METROPOLITAN AREA CLINIC 92 | Facility: CLINIC | Age: 67
End: 2022-11-14

## 2022-11-14 ENCOUNTER — LAB OUTSIDE AN ENCOUNTER (OUTPATIENT)
Dept: URBAN - METROPOLITAN AREA TELEHEALTH 2 | Facility: TELEHEALTH | Age: 67
End: 2022-11-14

## 2022-11-14 NOTE — HPI-TODAY'S VISIT:
Dear Mary White, November 14 MRI showed clear lung bases. No suspicious liver lesions with liver fat actually elevated at 15.3% which would be in the grade 1 liver fat range of 6.5 up to 17.4%. Unremarkable gallbladder seen and no bile duct dilation. No splenomegaly was seen. No main pancreas duct dilation seen. No adrenal nodule seen. Prior partial left nephrectomy changes seen.  No evidence of local recurrence.  Left renal cyst was seen and no hydronephrosis was seen. No suspicious lymph nodes were seen. Overall postsurgical changes were seen in the anterior abdominal wall. Liver fat was seen at 15.3% which is considered in the mild range desired is less than 6%.  You need to work on that. Good to see that there was no local recurrence or metastatic disease seen in the abdomen. Dr Lomeli

## 2022-11-16 ENCOUNTER — LAB OUTSIDE AN ENCOUNTER (OUTPATIENT)
Dept: URBAN - METROPOLITAN AREA CLINIC 86 | Facility: CLINIC | Age: 67
End: 2022-11-16

## 2022-11-21 ENCOUNTER — TELEPHONE ENCOUNTER (OUTPATIENT)
Dept: URBAN - METROPOLITAN AREA CLINIC 92 | Facility: CLINIC | Age: 67
End: 2022-11-21

## 2022-11-21 LAB
A/G RATIO: 1.7
ALBUMIN: 4.7
ALKALINE PHOSPHATASE: 70
ALT (SGPT): 19
AST (SGOT): 17
BASO (ABSOLUTE): 0.1
BASOS: 1
BILIRUBIN, TOTAL: 0.3
BUN/CREATININE RATIO: 15
BUN: 14
CALCIUM: 9.3
CARBON DIOXIDE, TOTAL: 22
CHLORIDE: 103
CREATININE: 0.92
EGFR: 67
EOS (ABSOLUTE): 0.3
EOS: 4
GLOBULIN, TOTAL: 2.8
GLUCOSE: 100
HCV LOG10: (no result)
HEMATOCRIT: 36.1
HEMATOLOGY COMMENTS:: (no result)
HEMOGLOBIN: 12
HEPATITIS C QUANTITATION: <12
IMMATURE CELLS: (no result)
IMMATURE GRANS (ABS): 0
IMMATURE GRANULOCYTES: 1
INR: 0.9
LYMPHS (ABSOLUTE): 2
LYMPHS: 22
MCH: 30.8
MCHC: 33.2
MCV: 93
MONOCYTES(ABSOLUTE): 0.5
MONOCYTES: 5
NEUTROPHILS (ABSOLUTE): 5.9
NEUTROPHILS: 67
NRBC: (no result)
PLATELETS: 332
POTASSIUM: 4.7
PROTEIN, TOTAL: 7.5
PROTHROMBIN TIME: 9.4
RBC: 3.9
RDW: 13.7
SODIUM: 141
TEST INFORMATION:: (no result)
WBC: 8.8

## 2022-11-21 NOTE — HPI-TODAY'S VISIT:
Dear Mary White, November 16 labs show glucose slightly up at 100 and was previously 88 in May but last year November was slightly up at 106.  Please share with primary provider. BUN of 14 creatinine 0.92 down from 1.0 and prior 1.08 so good to see that trend Down. Sodium 141 potassium 4.7 albumin 4.7 bilirubin 0.3 alkaline phosphatase 79 AST 17 ALT 19 with ideal ALT less than 25. White blood cell count 8.8 hemoglobin 12 platelet count 332 MCV 93.  INR normal at 0.9.  Hep C PCR remains less than 12 but not mentioned as being detected. Meld 6 and meld na 6 so remains low. Dr Lomeli

## 2022-12-01 ENCOUNTER — OFFICE VISIT (OUTPATIENT)
Dept: URBAN - METROPOLITAN AREA TELEHEALTH 2 | Facility: TELEHEALTH | Age: 67
End: 2022-12-01
Payer: MEDICARE

## 2022-12-01 VITALS — BODY MASS INDEX: 32.14 KG/M2 | WEIGHT: 200 LBS | HEIGHT: 66 IN

## 2022-12-01 DIAGNOSIS — B18.2 CHRONIC ACTIVE HEPATITIS C: ICD-10-CM

## 2022-12-01 DIAGNOSIS — E66.9 OBESITY: ICD-10-CM

## 2022-12-01 DIAGNOSIS — K76.9 LIVER LESION: ICD-10-CM

## 2022-12-01 DIAGNOSIS — K74.00 HEPATIC FIBROSIS: ICD-10-CM

## 2022-12-01 PROBLEM — 386033004: Status: ACTIVE | Noted: 2020-11-30

## 2022-12-01 PROBLEM — 300331000: Status: ACTIVE | Noted: 2020-11-30

## 2022-12-01 PROBLEM — 422338006 DEGENERATIVE DISORDER OF MACULA: Status: ACTIVE | Noted: 2022-12-01

## 2022-12-01 PROBLEM — 62484002: Status: ACTIVE | Noted: 2020-11-22

## 2022-12-01 PROBLEM — 166643006: Status: ACTIVE | Noted: 2020-11-22

## 2022-12-01 PROBLEM — 414916001 OBESITY: Status: ACTIVE | Noted: 2022-05-31

## 2022-12-01 PROBLEM — 197321007 FATTY LIVER: Status: ACTIVE | Noted: 2022-12-01

## 2022-12-01 PROBLEM — 708198006: Status: ACTIVE | Noted: 2020-11-22

## 2022-12-01 PROCEDURE — 99214 OFFICE O/P EST MOD 30 MIN: CPT

## 2022-12-01 RX ORDER — ESTRADIOL 1 MG/1
TAKE 1 TABLET (1 MG) BY ORAL ROUTE ONCE DAILY TABLET ORAL 1
Qty: 0 | Refills: 0 | Status: ACTIVE | COMMUNITY
Start: 1900-01-01

## 2022-12-01 RX ORDER — HYDROCHLOROTHIAZIDE 12.5 MG/1
0.5 TABLET IN THE MORNING TABLET ORAL ONCE A DAY
Status: ACTIVE | COMMUNITY

## 2022-12-01 RX ORDER — ASCORBIC ACID 125 MG
AS DIRECTED TABLET,CHEWABLE ORAL
Status: ACTIVE | COMMUNITY

## 2022-12-01 RX ORDER — DILTIAZEM HYDROCHLORIDE 240 MG/1
TAKE 1 CAPSULE (240 MG) BY ORAL ROUTE ONCE DAILY CAPSULE, COATED, EXTENDED RELEASE ORAL 1
Qty: 0 | Refills: 0 | Status: ACTIVE | COMMUNITY
Start: 1900-01-01

## 2022-12-01 RX ORDER — COMPOUNDING SYRUP VEHICLE 1 G/ML
AS DIRECTED SYRUP ORAL
Status: ACTIVE | COMMUNITY

## 2022-12-01 RX ORDER — UBIDECARENONE/VIT E ACET 100MG-5
1 CAPSULE CAPSULE ORAL ONCE A DAY
Status: ACTIVE | COMMUNITY

## 2022-12-01 RX ORDER — LEVOTHYROXINE SODIUM 100 UG/1
TAKE 1 TABLET (100 MCG) BY ORAL ROUTE ONCE DAILY TABLET ORAL 1
Qty: 0 | Refills: 0 | Status: ACTIVE | COMMUNITY
Start: 1900-01-01

## 2022-12-01 RX ORDER — MAGNESIUM OXIDE 400 MG/1
1 TABLET AS NEEDED TABLET ORAL ONCE A DAY
Status: ACTIVE | COMMUNITY

## 2022-12-01 RX ORDER — LISINOPRIL 10 MG/1
1 TABLET TABLET ORAL ONCE A DAY
Status: ACTIVE | COMMUNITY

## 2022-12-01 NOTE — HPI-TODAY'S VISIT:
This is a scheduled follow-up appointment for this patient, a 67 year old /White female, after a previous visit May 2022 for an evaluation for hepatitis C and on treatment evaluation of her newly diagnosed cirrhosis by fibroscan.   A copy of the note john be sent to the referring provider.   November 16 labs show glucose slightly up at 100 and was previously 88 in May but last year November was slightly up at 106.  Please share with primary provider. BUN of 14 creatinine 0.92 down from 1.0 and prior 1.08 so good to see that trend Down. Sodium 141 potassium 4.7 albumin 4.7 bilirubin 0.3 alkaline phosphatase 79 AST 17 ALT 19 with ideal ALT less than 25. White blood cell count 8.8 hemoglobin 12 platelet count 332 MCV 93.  INR normal at 0.9.  Hep C PCR remains less than 12 but not mentioned as being detected. Meld 6 and meld na 6 so remains low.  November 14 MRI showed clear lung bases. No suspicious liver lesions with liver fat actually elevated at 15.3% which would be in the grade 1 liver fat range of 6.5 up to 17.4%. Unremarkable gallbladder seen and no bile duct dilation. No splenomegaly was seen. No main pancreas duct dilation seen. No adrenal nodule seen. Prior partial left nephrectomy changes seen.  No evidence of local recurrence.  Left renal cyst was seen and no hydronephrosis was seen. No suspicious lymph nodes were seen. Overall postsurgical changes were seen in the anterior abdominal wall. Liver fat was seen at 15.3% which is considered in the mild range desired is less than 6%.  You need to work on that. Good to see that there was no local recurrence or metastatic disease seen in the abdomen.   May 23: afp normal 3.0. Wbc 8.7 and hg 12.2 and hct 37.4 and mcv 93 and plat 348.neutrophils 5.9 and lymphs 1.9. Glu 88 and bun 13 and cr 1.00 and na 137 and k 4.6 and cl 99 and co2 19 and ca 9.4 and alb 4.5 and tb 0.2 and alk 66 and ast 16 and alt 14. ( ast 21 and alt 17 prior and ideal alt is less than 25 so doing well.) HCV pcr negative. Inr 0.9. Meld 6 and meld na 6.  May 23 MRI released to me. Lower thorax was normal. Liver  remains fatty with the liver being at 13.4% but it is slightly lower than 15 to 16% back in May 2021.  Ideal fat is less than 6% so we need to keep working on that. They see a lobular hepatic contour with subtle nodular enhancement without overt morphologic changes of chronic liver disease. Redemonstrated 6 mm segment 2 internal enhancing focus seen with alcohol and they felt as a perfusion anomaly.  Another nonspecific 6 mm enhancing and diffusion restriction nodule seen in the capsule stable since November 2020.  We will plan to do another MRI in 6 months to look at these. Gallbladder/biliary tree normal. Spleen, pancreas, and adrenals normal. Postsurgical changes seen of the partial left nephrectomy without evidence of recurrence.  Redemonstrated left renal cyst seen. Small periportal, mesenteric and retroperitoneal lymph nodes seen which are stable since November 2020. Replaced left hepatic artery seen arising from the left gastric artery.  This is an anatomic variant.  Accessory replaced right hepatic artery seen arising from the superior mesenteric artery as well which is again another anatomic variant. Some degenerative changes seen of the spine.   November 2021 labs show white cell count 9.5 hemoglobin 12.3 platelet count 336 MCV 91 neutrophils 6.3 lymphocytes 2.2.  These are all normal range.   Glucose slightly up at 106 previously was 82.  Maybe not fasting this day but she says it was and so may want to share with local provider. BUN of 19 creatinine 1.08 which is slightly up and previously was 1.03 but prior also was 1.05 last year. Sodium 136 potassium 4.4 calcium 9.3 albumin 4.5 bilirubin 0.3 alkaline phosphatase 67 AST 21 ALT 17.  Previously AST 24 and ALT 20. Hep C PCR less than 12 and not detected. INR normal at 0.9. Meld low at 7 and meld na 7.  She says that she has not gained weight and not exercising due to feet issues due to gout and gout is doing better and she is having the issues.  She says on cardizem cd and enalapril 10/25mg for her bp.  November 1 MRI back. Lower thorax shows median sternotomy wires. Liver showed fat deposition and redemonstration of mild surface nodularity. No fat quant given. There are some perfusion abnormalities that are seen throughout the liver and that requires a 6-month follow-up surveillance of this. Gallbladder and biliary tree appeared to be normal. Spleen was normal. Pancreas was normal. Subcentimeter left simple cyst seen in the kidney.  Status post left partial nephrectomy noted.  No evidence of local recurrence seen. Lymph nodes normal. Liver vessels normal. Retroperitoneum reported to be normal. No aggressive osseous lesions and postsurgical changes seen in the ventral abdominal midline. Overall they saw postsurgical changes of partial left nephrectomy with no recurrent or metastatic disease seen.  The liver is fatty appearing but they did not state by how much fat percentage. Sometimes technically they are not able to do that measurement.   May 11: hcv pcr less than 12 and so great to see. Ast 24 and alt 20 and alk 63 and tb 0.3 and ca 9.5 and na 140 and k 4.3 and cr 1.03 and bun 13. Glu 82. Wbc 9.5 and hg 12.9 and plat 363. Mcv 91.  Prior cr 1.05 and so little lower now 1.03.   May 11 2021 MRI shows the liver to be fatty at 15 to 16%.  Desired is less than 6%.  Minimal surface nodularity still seen in the liver but without definite findings of cirrhosis. Redemonstrated 6 mm segment to hyperechoic lesion seen which they feel is a perfusion anomaly.  We should check on that in 6 months. Gallbladder biliary tree were normal.  Spleen normal.  Pancreas normal.  Postsurgical changes seen of partial left nephrectomy but without evidence of recurrence seen. Redemonstrated subcentimeter left renal cyst seen. They mention that you have an anatomic variant of the replaced left hepatic artery arising from the left gastric artery.  Also accessory or replaced right hepatic artery arising from the superior mesenteric artery. Postsurgical changes also seen along the anterior abdominal wall. Overall they found no evidence of recurrent or metastatic disease within the abdomen from the previous left partial nephrectomy and the liver was fatty at 15 to 16% fat.  Nov 2020 10 and inr 0.9 and hcv  less than 12. ast 18 and alt 16 so at ideal. Tb 0.2 and alk 66. na 137 and k 4.2 and cl 99 and co2 24 and bun 17 and cr 1.05 slightly up and glu 122 elevated and maybe not fasting and show local md. wbc 8.7 hg 12.1 plat 338. mcv 93.    Dr Sanderson: usually sees once a year.  Nov 13 2020 na 139 and k 3.5 and cl 103 and bun 18 and cr 1.30 and glu 131 and alb 4.2 and tb 0.3 and ast 17 and alt 15 and alk 56 and wbc 10.3 and hg 12.6 plat 329.    		 Document Type:	MRI Abdomen w/ + w/o Contrast Document Date:	November 10, 2020 11:36  Document Status:	Auth (Verified) Document Title:	MRI Abdomen w/ + w/o Contrast Performed By:	Juanpablo Reza  Verified By:	Juanpablo Reza on November 10, 2020 13:26  Encounter info:	[id number], [facility], Single Visit OP, 11/10/2020 - 11/10/2020   * Final Report * IMPRESSION: Prior left partial nephrectomy without locally recurrent or metastatic disease within the abdomen.  Signature Line *** Final ***  Electronically Signed By:  Juanpablo Reza on  11/10/2020 13:26  Dictated by:  Juanpablo Reza    Nov 7 2019 glu 98 and cr 1.11 slightly up and na 138 and k 4.4 and cl 100 and total protein 8.2 slightly up. alb 4.6 and tb 0.4 and alk 61 and ast 18 and alt 17 wbc 9.5 and hg 12.9 and plat 335.  HCV pcr neg.  mri 2019:   Document Type: MRI Abdomen w/ + w/o Contrast Document Date: November 06, 2019 10:37  Document Status: Auth (Verified) Document Title: MRI Abdomen w/ + w/o Contrast Performed By: Geoff Farah IV  Verified By: Geoff Farah IV on November 06, 2019 11:23  Encounter info: [id number], [facility], Single Visit OP, 11/6/2019 - 11/6/2019 IMPRESSION:      1. There is marked hepatic steatosis. Morphologically normal liver. No suspicious lesions. No stigmata of portal hypertension. 2. Stable postsurgical defect in the left kidney with no enhancing lesions.   Signature Line *** Final ***  Electronically Signed By:  Geoff Farah IV on  11/06/2019 11:23  Dictated by:  Geoff Farah IV    Document Type: XR Chest 2 Views PA + Lat Stnd Protocol Document Date: November 06, 2019 11:08  Document Status: Auth (Verified) Document Title: XR Chest 2 Views PA + Lat Stnd Protocol Performed By: Kwadwo Hare  Verified By: Kwadwo Hare on November 06, 2019 13:26  Encounter info: [id number], [facility], Single Visit OP, 11/6/2019 - 11/6/2019 IMPRESSION:  Minimal basilar atelectasis. CT chest has increased sensitivity for metastatic disease.   Signature Line *** Final ***  Electronically Signed By:  Kwadwo Hare on  11/06/2019 13:26  Dictated by:  Kwadwo Hare  She had a s/p robotic L partial nephrectomy showing papillary RCC in April 2018.   She had neurological issues and she saw Dr. Bazzi and not noted to have brain tumor, noted to have hemorrhorage, she has vascular abnormality. He referred to Dr Looney. She had a cath and looked at flow and saw an anomaly. Says also told re radiation. Nothing done and she says to return prn to see Dr Looney.   She has not seen him or anyone for this again and no other issues.  She has not done the covid 19 vaccine and says she does not feel needs it.   did get one of them.    Prior labs 2019 na 136 and k 4.0 and alb 4.4 and tb 0.4 and alk 51 and ast 14 and alt 12 and wbc 9.0 and hg 12.5 and plat 282.afp 5.3 normal and hcv pcr negative.  March 2019 u/s:  Document Type: US Abdomen Complete Document Date: March 12, 2019 09:52  Document Status: Auth (Verified) Document Title: US Abdomen Complete Performed By: Paxton Ray  Verified By: Kalpesh Cisneros on March 12, 2019 10:31  Encounter info: [id number], [facility], Single Visit OP, 3/12/2019 - 3/12/2019  * Final Report *  Reason For Exam HEPATITIS C  REPORT EXAM: US Abdomen Complete, US Abdomen Doppler Complete  CLINICAL INDICATION: HEPATITIS C. elevated AFP; Port HTN; left renal cell carcinoma IMPRESSION: 1. Coarsened hepatic echotexture with increased echogenicity consistent with chronic liver disease/hepatic steatosis. No suspicious liver lesions. Patent hepatic vasculature with normal direction of flow. 2. Punctate calcifications within the liver and spleen, likely the sequela of old granulomatous disease.  The images were reviewed and interpreted by Kalpesh Cisneros MD.  Signature Line *** Final ***  Electronically Signed By:  Kalpesh Cisneros on  03/12/2019 10:31  Dictated by:  Paxton Ray   IR report showed Nov 15 2018 grade 2 medial parietal avm.  9/11/18 labs gluc 91 cr 0.96 alp 56 tb 0.5 ast 16 alt 11 wbc 7 hgb 12.6 plt 304 apf 5 hep c negative  june 2018 u/s: * Final Report *  Reason For Exam Hepatitis C, chronic  REPORT EXAM: US Abdomen Doppler Complete, US Abdomen Complete  CLINICAL INDICATION: Hepatitis C, chronic. partial L nephrectomy  TECHNIQUE: Grayscale, pulsed wave and color Doppler sonography of the upper abdomen were performed.  COMPARISON: December 12, 2017  FINDINGS:  IMPRESSION: 1. Echogenic liver with nodular contour suggestive of hepatic steatosis/chronic liver disease. 2. Patent hepatic vasculature.  Signature Line *** Final ***  Electronically Signed By:  Kalpesh Cisneros on  06/07/2018 10:40  july 2018 ct c/a/p no acute findings, colonic diverticulosis, atherosclerosis, granulomatous dz noted in liver. post surgical changes suggested involvoing the left kidney  ct chead july 2018 acute parenchymal hemorrhage in right parietal lobe  4/16/18 kidney path:  KIDNEY, LEFT, MASS, ROBOTIC PARTIAL NEPHRECTOMY: Renal cell carcinoma, papillary type 1, greatest carcinoma dimension 4.4 cm Carcinoma is limited to the kidney Pedro nuclear grade G2 Clear cell change in approximately 10% tumor, no sarcomatoid change identified Carcinoma at inked parenchymal margin of excision (tumor grossly perforated) Uninvolved renal parenchyma with chronic interstitial nephritis  LYMPH NODES, PARA-AORTIC, EXCISION: No metastatic carcinoma identified in three lymph nodes (0/3) Focus of endosalpingiosis in one lymph node  SOFT TISSUE, UMM-TUMOR FAT, EXCISION: Benign fibroadipose tissue No carcinoma identified   She did colon with dr Mcgee and do in 5 yrs 2021.  Seing Dr Shields april 2022 and he said she was stable.  Plan: 1. U.s of liver in 6m  to check and maybe mri in 1 yr as no issue with perfusion changes mentioned. 2. Dr Shields sees for renal and Dr Sanderson sees occ. 3. See us in 6m.    Stressed to pt the need for social distancing and strict handwashing and wearing a mask and to follow any other new or added CDC recommendations as this is an evolving target.  Duration of visit 31 mins with 10 min of prep and then 21 min today via doximity audio as dox video failed with over 50% of the time explaining pt's condition and treatment plan with the pt.

## 2022-12-06 ENCOUNTER — APPOINTMENT (RX ONLY)
Dept: URBAN - METROPOLITAN AREA OTHER 10 | Facility: OTHER | Age: 67
Setting detail: DERMATOLOGY
End: 2022-12-06

## 2022-12-06 DIAGNOSIS — L72.0 EPIDERMAL CYST: ICD-10-CM

## 2022-12-06 DIAGNOSIS — L27.0 GENERALIZED SKIN ERUPTION DUE TO DRUGS AND MEDICAMENTS TAKEN INTERNALLY: ICD-10-CM

## 2022-12-06 DIAGNOSIS — L82.1 OTHER SEBORRHEIC KERATOSIS: ICD-10-CM

## 2022-12-06 DIAGNOSIS — L81.4 OTHER MELANIN HYPERPIGMENTATION: ICD-10-CM

## 2022-12-06 PROCEDURE — ? PRESCRIPTION

## 2022-12-06 PROCEDURE — ? PRESCRIPTION MEDICATION MANAGEMENT

## 2022-12-06 PROCEDURE — 99213 OFFICE O/P EST LOW 20 MIN: CPT

## 2022-12-06 PROCEDURE — ? SUNSCREEN RECOMMENDATIONS

## 2022-12-06 PROCEDURE — ? COUNSELING

## 2022-12-06 RX ORDER — MUPIROCIN 20 MG/G
OINTMENT TOPICAL AS DIRECTED
Qty: 22 | Refills: 0 | Status: ERX | COMMUNITY
Start: 2022-12-06

## 2022-12-06 RX ORDER — TRIAMCINOLONE ACETONIDE 1 MG/G
OINTMENT TOPICAL
Qty: 453.6 | Refills: 0 | Status: ERX | COMMUNITY
Start: 2022-12-06

## 2022-12-06 RX ADMIN — MUPIROCIN: 20 OINTMENT TOPICAL at 00:00

## 2022-12-06 RX ADMIN — TRIAMCINOLONE ACETONIDE: 1 OINTMENT TOPICAL at 00:00

## 2022-12-06 ASSESSMENT — LOCATION SIMPLE DESCRIPTION DERM
LOCATION SIMPLE: RIGHT BUTTOCK
LOCATION SIMPLE: RIGHT THIGH
LOCATION SIMPLE: CHEST
LOCATION SIMPLE: LEFT THIGH
LOCATION SIMPLE: ABDOMEN
LOCATION SIMPLE: RIGHT UPPER BACK

## 2022-12-06 ASSESSMENT — LOCATION ZONE DERM
LOCATION ZONE: TRUNK
LOCATION ZONE: LEG

## 2022-12-06 ASSESSMENT — LOCATION DETAILED DESCRIPTION DERM
LOCATION DETAILED: EPIGASTRIC SKIN
LOCATION DETAILED: RIGHT ANTERIOR PROXIMAL THIGH
LOCATION DETAILED: LEFT ANTERIOR PROXIMAL THIGH
LOCATION DETAILED: MIDDLE STERNUM
LOCATION DETAILED: RIGHT MID-UPPER BACK
LOCATION DETAILED: PERIUMBILICAL SKIN
LOCATION DETAILED: RIGHT BUTTOCK

## 2022-12-06 NOTE — PROCEDURE: PRESCRIPTION MEDICATION MANAGEMENT
Initiate Treatment: triamcinolone acetonide 0.1 % topical ointment
Detail Level: Zone
Render In Strict Bullet Format?: No
Plan: Pt is to apply Triamcinolone to the rash area of the body twice daily for 2 weeks. Dr. Wood discussed to follow up with PCP regarding Drug rash from allopurinol and to stop the medication. Pt is to discuss alternative with PCP for tx of gout.
Plan: Pt is to apply mupirocin 2 % topical ointment to the affected area twice daily until healed.
Initiate Treatment: mupirocin 2 % topical ointment as directed

## 2023-01-03 ENCOUNTER — APPOINTMENT (RX ONLY)
Dept: URBAN - METROPOLITAN AREA OTHER 10 | Facility: OTHER | Age: 68
Setting detail: DERMATOLOGY
End: 2023-01-03

## 2023-01-03 DIAGNOSIS — L72.0 EPIDERMAL CYST: ICD-10-CM | Status: STABLE

## 2023-01-03 DIAGNOSIS — L85.3 XEROSIS CUTIS: ICD-10-CM

## 2023-01-03 DIAGNOSIS — L27.0 GENERALIZED SKIN ERUPTION DUE TO DRUGS AND MEDICAMENTS TAKEN INTERNALLY: ICD-10-CM | Status: INADEQUATELY CONTROLLED

## 2023-01-03 DIAGNOSIS — L82.1 OTHER SEBORRHEIC KERATOSIS: ICD-10-CM

## 2023-01-03 PROCEDURE — ? OTHER

## 2023-01-03 PROCEDURE — 99213 OFFICE O/P EST LOW 20 MIN: CPT

## 2023-01-03 PROCEDURE — ? PRESCRIPTION

## 2023-01-03 PROCEDURE — ? COUNSELING

## 2023-01-03 RX ORDER — TRIAMCINOLONE ACETONIDE 1 MG/ML
LOTION TOPICAL
Qty: 60 | Refills: 0 | Status: ERX | COMMUNITY
Start: 2023-01-03

## 2023-01-03 RX ORDER — PREDNISONE 10 MG/1
TABLET ORAL
Qty: 50 | Refills: 0 | Status: ERX | COMMUNITY
Start: 2023-01-03

## 2023-01-03 RX ADMIN — TRIAMCINOLONE ACETONIDE: 1 LOTION TOPICAL at 00:00

## 2023-01-03 RX ADMIN — PREDNISONE: 10 TABLET ORAL at 00:00

## 2023-01-03 ASSESSMENT — LOCATION SIMPLE DESCRIPTION DERM
LOCATION SIMPLE: LEFT UPPER BACK
LOCATION SIMPLE: CHEST
LOCATION SIMPLE: RIGHT UPPER BACK
LOCATION SIMPLE: ABDOMEN
LOCATION SIMPLE: RIGHT BUTTOCK

## 2023-01-03 ASSESSMENT — LOCATION DETAILED DESCRIPTION DERM
LOCATION DETAILED: LEFT MEDIAL SUPERIOR CHEST
LOCATION DETAILED: RIGHT MEDIAL SUPERIOR CHEST
LOCATION DETAILED: RIGHT MID-UPPER BACK
LOCATION DETAILED: PERIUMBILICAL SKIN
LOCATION DETAILED: LEFT MEDIAL UPPER BACK
LOCATION DETAILED: EPIGASTRIC SKIN
LOCATION DETAILED: LEFT SUPERIOR MEDIAL UPPER BACK
LOCATION DETAILED: RIGHT BUTTOCK

## 2023-01-03 ASSESSMENT — LOCATION ZONE DERM: LOCATION ZONE: TRUNK

## 2023-01-03 ASSESSMENT — SEVERITY ASSESSMENT: SEVERITY: MILD

## 2023-01-03 NOTE — PROCEDURE: OTHER
Other (Free Text): Discontinue witch hazel\\nDiscontinue Allopurinol\\nPt has stopped Uloric 4 days prior, pt questioned about possible asso w/ drug rash. discussed w/ patient unable to tell at this time since pt start the medication during the active rash, need to clear the rash and restart Uloric to see. Pt does have allergic rxn to allopurinol.\\n\\nPt declined topical steroid medication due to texture; pt refers oral medications to treat the rash. S/p medrol dose delilah by PCP w/o complete clearance but did improved.
Render Risk Assessment In Note?: no
Note Text (......Xxx Chief Complaint.): This diagnosis correlates with the
Detail Level: Zone
Other (Free Text): Apply zinc paste at night\\nC/w mupirocin ointment until heal

## 2023-02-03 ENCOUNTER — APPOINTMENT (RX ONLY)
Dept: URBAN - METROPOLITAN AREA OTHER 10 | Facility: OTHER | Age: 68
Setting detail: DERMATOLOGY
End: 2023-02-03

## 2023-02-03 DIAGNOSIS — L259 CONTACT DERMATITIS AND OTHER ECZEMA, UNSPECIFIED CAUSE: ICD-10-CM

## 2023-02-03 DIAGNOSIS — L29.8 OTHER PRURITUS: ICD-10-CM | Status: INADEQUATELY CONTROLLED

## 2023-02-03 DIAGNOSIS — L29.89 OTHER PRURITUS: ICD-10-CM | Status: INADEQUATELY CONTROLLED

## 2023-02-03 PROBLEM — L30.9 DERMATITIS, UNSPECIFIED: Status: ACTIVE | Noted: 2023-02-03

## 2023-02-03 PROCEDURE — ? BIOPSY BY PUNCH METHOD

## 2023-02-03 PROCEDURE — 11104 PUNCH BX SKIN SINGLE LESION: CPT

## 2023-02-03 PROCEDURE — 99214 OFFICE O/P EST MOD 30 MIN: CPT | Mod: 25

## 2023-02-03 PROCEDURE — ? PRESCRIPTION MEDICATION MANAGEMENT

## 2023-02-03 PROCEDURE — ? PRESCRIPTION

## 2023-02-03 PROCEDURE — ? COUNSELING

## 2023-02-03 RX ORDER — HYDROXYZINE HYDROCHLORIDE 25 MG/1
TABLET, FILM COATED ORAL QHS
Qty: 90 | Refills: 0 | Status: ERX | COMMUNITY
Start: 2023-02-03

## 2023-02-03 RX ADMIN — HYDROXYZINE HYDROCHLORIDE: 25 TABLET, FILM COATED ORAL at 00:00

## 2023-02-03 ASSESSMENT — LOCATION SIMPLE DESCRIPTION DERM
LOCATION SIMPLE: RIGHT UPPER ARM
LOCATION SIMPLE: ABDOMEN
LOCATION SIMPLE: RIGHT THIGH
LOCATION SIMPLE: RIGHT LOWER BACK
LOCATION SIMPLE: TRAPEZIAL NECK

## 2023-02-03 ASSESSMENT — LOCATION DETAILED DESCRIPTION DERM
LOCATION DETAILED: RIGHT ANTERIOR PROXIMAL UPPER ARM
LOCATION DETAILED: EPIGASTRIC SKIN
LOCATION DETAILED: RIGHT ANTERIOR PROXIMAL THIGH
LOCATION DETAILED: RIGHT ANTERIOR DISTAL THIGH
LOCATION DETAILED: MID TRAPEZIAL NECK
LOCATION DETAILED: RIGHT INFERIOR LATERAL MIDBACK

## 2023-02-03 ASSESSMENT — LOCATION ZONE DERM
LOCATION ZONE: TRUNK
LOCATION ZONE: NECK
LOCATION ZONE: LEG
LOCATION ZONE: ARM

## 2023-02-03 ASSESSMENT — BSA RASH: BSA RASH: 3

## 2023-02-03 ASSESSMENT — ITCH NUMERIC RATING SCALE: HOW SEVERE IS YOUR ITCHING?: 7

## 2023-02-03 NOTE — PROCEDURE: BIOPSY BY PUNCH METHOD

## 2023-02-03 NOTE — PROCEDURE: PRESCRIPTION MEDICATION MANAGEMENT
Initiate Treatment: hydroxyzine 25 mg QHS, if able to tolerate then okay to do BID
Detail Level: Zone
Render In Strict Bullet Format?: No

## 2023-02-17 ENCOUNTER — APPOINTMENT (RX ONLY)
Dept: URBAN - METROPOLITAN AREA OTHER 10 | Facility: OTHER | Age: 68
Setting detail: DERMATOLOGY
End: 2023-02-17

## 2023-02-17 DIAGNOSIS — Z48.02 ENCOUNTER FOR REMOVAL OF SUTURES: ICD-10-CM

## 2023-02-17 DIAGNOSIS — T88.7XX: ICD-10-CM

## 2023-02-17 DIAGNOSIS — F42.4 EXCORIATION (SKIN-PICKING) DISORDER: ICD-10-CM

## 2023-02-17 PROBLEM — T88.7XXA UNSPECIFIED ADVERSE EFFECT OF DRUG OR MEDICAMENT, INITIAL ENCOUNTER: Status: ACTIVE | Noted: 2023-02-17

## 2023-02-17 PROCEDURE — ? ADDITIONAL NOTES

## 2023-02-17 PROCEDURE — 99214 OFFICE O/P EST MOD 30 MIN: CPT

## 2023-02-17 PROCEDURE — ? SUTURE REMOVAL (GLOBAL PERIOD)

## 2023-02-17 PROCEDURE — ? ORDER TESTS

## 2023-02-17 PROCEDURE — ? PRESCRIPTION MEDICATION MANAGEMENT

## 2023-02-17 PROCEDURE — ? PRESCRIPTION

## 2023-02-17 PROCEDURE — ? COUNSELING

## 2023-02-17 PROCEDURE — ? MEDICATION COUNSELING

## 2023-02-17 RX ORDER — GABAPENTIN 300 MG/1
CAPSULE ORAL
Qty: 30 | Refills: 0 | Status: ERX | COMMUNITY
Start: 2023-02-17

## 2023-02-17 RX ADMIN — GABAPENTIN: 300 CAPSULE ORAL at 00:00

## 2023-02-17 ASSESSMENT — LOCATION SIMPLE DESCRIPTION DERM
LOCATION SIMPLE: RIGHT UPPER BACK
LOCATION SIMPLE: UPPER BACK
LOCATION SIMPLE: RIGHT LOWER BACK

## 2023-02-17 ASSESSMENT — LOCATION DETAILED DESCRIPTION DERM
LOCATION DETAILED: INFERIOR THORACIC SPINE
LOCATION DETAILED: RIGHT MEDIAL UPPER BACK
LOCATION DETAILED: RIGHT SUPERIOR MEDIAL MIDBACK

## 2023-02-17 ASSESSMENT — LOCATION ZONE DERM: LOCATION ZONE: TRUNK

## 2023-02-17 NOTE — PROCEDURE: ORDER TESTS
Performing Laboratory: 0
Billing Type: Third-Party Bill
Expected Date Of Service: 02/17/2023
Bill For Surgical Tray: no

## 2023-02-17 NOTE — PROCEDURE: SUTURE REMOVAL (GLOBAL PERIOD)
Detail Level: Detailed
Add 33789 Cpt? (Important Note: In 2017 The Use Of 99275 Is Being Tracked By Cms To Determine Future Global Period Reimbursement For Global Periods): no

## 2023-02-17 NOTE — PROCEDURE: PRESCRIPTION MEDICATION MANAGEMENT
Plan: Pt had a MRI in November 2022. Pt is scheduled for an ultrasound in May 2023. Pt was advised to follow up with oncologist to re-evaluate.
Render In Strict Bullet Format?: No
Detail Level: Zone
Initiate Treatment: Gabapentin 300mg take by mouth nightly, if able to tolerate increase to twice daily.
Continue Regimen: Clobetasol 0.05% ointment- apply to affected areas on body x2 weeks, then prn for itch.\\n-pt has rx remaining, declined rx
Discontinue Regimen: Hydroxyzine 25mg qhs

## 2023-02-17 NOTE — PROCEDURE: MEDICATION COUNSELING
Topical Clindamycin Counseling: Patient counseled that this medication may cause skin irritation or allergic reactions.  In the event of skin irritation, the patient was advised to reduce the amount of the drug applied or use it less frequently.   The patient verbalized understanding of the proper use and possible adverse effects of clindamycin.  All of the patient's questions and concerns were addressed.
Rituxan Pregnancy And Lactation Text: This medication is Pregnancy Category C and it isn't know if it is safe during pregnancy. It is unknown if this medication is excreted in breast milk but similar antibodies are known to be excreted.
Spironolactone Counseling: Patient advised regarding risks of diarrhea, abdominal pain, hyperkalemia, birth defects (for female patients), liver toxicity and renal toxicity. The patient may need blood work to monitor liver and kidney function and potassium levels while on therapy. The patient verbalized understanding of the proper use and possible adverse effects of spironolactone.  All of the patient's questions and concerns were addressed.
Eucrisa Counseling: Patient may experience a mild burning sensation during topical application. Eucrisa is not approved in children less than 2 years of age.
Acitretin Pregnancy And Lactation Text: This medication is Pregnancy Category X and should not be given to women who are pregnant or may become pregnant in the future. This medication is excreted in breast milk.
Wartpeel Pregnancy And Lactation Text: This medication is Pregnancy Category X and contraindicated in pregnancy and in women who may become pregnant. It is unknown if this medication is excreted in breast milk.
Libtayo Pregnancy And Lactation Text: This medication is contraindicated in pregnancy and when breast feeding.
Calcipotriene Pregnancy And Lactation Text: This medication has not been proven safe during pregnancy. It is unknown if this medication is excreted in breast milk.
Qbrexza Pregnancy And Lactation Text: There is no available data on Qbrexza use in pregnant women.  There is no available data on Qbrexza use in lactation.
Include Pregnancy/Lactation Warning?: No
Albendazole Pregnancy And Lactation Text: This medication is Pregnancy Category C and it isn't known if it is safe during pregnancy. It is also excreted in breast milk.
Clindamycin Counseling: I counseled the patient regarding use of clindamycin as an antibiotic for prophylactic and/or therapeutic purposes. Clindamycin is active against numerous classes of bacteria, including skin bacteria. Side effects may include nausea, diarrhea, gastrointestinal upset, rash, hives, yeast infections, and in rare cases, colitis.
Niacinamide Pregnancy And Lactation Text: These medications are considered safe during pregnancy.
Cyclosporine Pregnancy And Lactation Text: This medication is Pregnancy Category C and it isn't know if it is safe during pregnancy. This medication is excreted in breast milk.
Minocycline Pregnancy And Lactation Text: This medication is Pregnancy Category D and not consider safe during pregnancy. It is also excreted in breast milk.
Gabapentin Counseling: I discussed with the patient the risks of gabapentin including but not limited to dizziness, somnolence, fatigue and ataxia.
Terbinafine Pregnancy And Lactation Text: This medication is Pregnancy Category B and is considered safe during pregnancy. It is also excreted in breast milk and breast feeding isn't recommended.
Valtrex Counseling: I discussed with the patient the risks of valacyclovir including but not limited to kidney damage, nausea, vomiting and severe allergy.  The patient understands that if the infection seems to be worsening or is not improving, they are to call.
Olumiant Pregnancy And Lactation Text: Based on animal studies, Olumiant may cause embryo-fetal harm when administered to pregnant women.  The medication should not be used in pregnancy.  Breastfeeding is not recommended during treatment.
Mirvaso Counseling: Mirvaso is a topical medication which can decrease superficial blood flow where applied. Side effects are uncommon and include stinging, redness and allergic reactions.
Dupixent Counseling: I discussed with the patient the risks of dupilumab including but not limited to eye infection and irritation, cold sores, injection site reactions, worsening of asthma, allergic reactions and increased risk of parasitic infection.  Live vaccines should be avoided while taking dupilumab. Dupilumab will also interact with certain medications such as warfarin and cyclosporine. The patient understands that monitoring is required and they must alert us or the primary physician if symptoms of infection or other concerning signs are noted.
Taltz Pregnancy And Lactation Text: The risk during pregnancy and breastfeeding is uncertain with this medication.
Aklief counseling:  Patient advised to apply a pea-sized amount only at bedtime and wait 30 minutes after washing their face before applying.  If too drying, patient may add a non-comedogenic moisturizer.  The most commonly reported side effects including irritation, redness, scaling, dryness, stinging, burning, itching, and increased risk of sunburn.  The patient verbalized understanding of the proper use and possible adverse effects of retinoids.  All of the patient's questions and concerns were addressed.
Eucrisa Pregnancy And Lactation Text: This medication has not been assigned a Pregnancy Risk Category but animal studies failed to show danger with the topical medication. It is unknown if the medication is excreted in breast milk.
Bexarotene Counseling:  I discussed with the patient the risks of bexarotene including but not limited to hair loss, dry lips/skin/eyes, liver abnormalities, hyperlipidemia, pancreatitis, depression/suicidal ideation, photosensitivity, drug rash/allergic reactions, hypothyroidism, anemia, leukopenia, infection, cataracts, and teratogenicity.  Patient understands that they will need regular blood tests to check lipid profile, liver function tests, white blood cell count, thyroid function tests and pregnancy test if applicable.
Fluconazole Counseling:  Patient counseled regarding adverse effects of fluconazole including but not limited to headache, diarrhea, nausea, upset stomach, liver function test abnormalities, taste disturbance, and stomach pain.  There is a rare possibility of liver failure that can occur when taking fluconazole.  The patient understands that monitoring of LFTs and kidney function test may be required, especially at baseline. The patient verbalized understanding of the proper use and possible adverse effects of fluconazole.  All of the patient's questions and concerns were addressed.
Odomzo Counseling- I discussed with the patient the risks of Odomzo including but not limited to nausea, vomiting, diarrhea, constipation, weight loss, changes in the sense of taste, decreased appetite, muscle spasms, and hair loss.  The patient verbalized understanding of the proper use and possible adverse effects of Odomzo.  All of the patient's questions and concerns were addressed.
Oxybutynin Pregnancy And Lactation Text: This medication is Pregnancy Category B and is considered safe during pregnancy. It is unknown if it is excreted in breast milk.
Nsaids Counseling: NSAID Counseling: I discussed with the patient that NSAIDs should be taken with food. Prolonged use of NSAIDs can result in the development of stomach ulcers.  Patient advised to stop taking NSAIDs if abdominal pain occurs.  The patient verbalized understanding of the proper use and possible adverse effects of NSAIDs.  All of the patient's questions and concerns were addressed.
Siliq Counseling:  I discussed with the patient the risks of Siliq including but not limited to new or worsening depression, suicidal thoughts and behavior, immunosuppression, malignancy, posterior leukoencephalopathy syndrome, and serious infections.  The patient understands that monitoring is required including a PPD at baseline and must alert us or the primary physician if symptoms of infection or other concerning signs are noted. There is also a special program designed to monitor depression which is required with Siliq.
Quinolones Counseling:  I discussed with the patient the risks of fluoroquinolones including but not limited to GI upset, allergic reaction, drug rash, diarrhea, dizziness, photosensitivity, yeast infections, liver function test abnormalities, tendonitis/tendon rupture.
Spironolactone Pregnancy And Lactation Text: This medication can cause feminization of the male fetus and should be avoided during pregnancy. The active metabolite is also found in breast milk.
Arava Counseling:  Patient counseled regarding adverse effects of Arava including but not limited to nausea, vomiting, abnormalities in liver function tests. Patients may develop mouth sores, rash, diarrhea, and abnormalities in blood counts. The patient understands that monitoring is required including LFTs and blood counts.  There is a rare possibility of scarring of the liver and lung problems that can occur when taking methotrexate. Persistent nausea, loss of appetite, pale stools, dark urine, cough, and shortness of breath should be reported immediately. Patient advised to discontinue Arava treatment and consult with a physician prior to attempting conception. The patient will have to undergo a treatment to eliminate Arava from the body prior to conception.
Winlevi Counseling:  I discussed with the patient the risks of topical clascoterone including but not limited to erythema, scaling, itching, and stinging. Patient voiced their understanding.
Rhofade Counseling: Rhofade is a topical medication which can decrease superficial blood flow where applied. Side effects are uncommon and include stinging, redness and allergic reactions.
Aklief Pregnancy And Lactation Text: It is unknown if this medication is safe to use during pregnancy.  It is unknown if this medication is excreted in breast milk.  Breastfeeding women should use the topical cream on the smallest area of the skin for the shortest time needed while breastfeeding.  Do not apply to nipple and areola.
Methotrexate Counseling:  Patient counseled regarding adverse effects of methotrexate including but not limited to nausea, vomiting, abnormalities in liver function tests. Patients may develop mouth sores, rash, diarrhea, and abnormalities in blood counts. The patient understands that monitoring is required including LFT's and blood counts.  There is a rare possibility of scarring of the liver and lung problems that can occur when taking methotrexate. Persistent nausea, loss of appetite, pale stools, dark urine, cough, and shortness of breath should be reported immediately. Patient advised to discontinue methotrexate treatment at least three months before attempting to become pregnant.  I discussed the need for folate supplements while taking methotrexate.  These supplements can decrease side effects during methotrexate treatment. The patient verbalized understanding of the proper use and possible adverse effects of methotrexate.  All of the patient's questions and concerns were addressed.
Gabapentin Pregnancy And Lactation Text: This medication is Pregnancy Category C and isn't considered safe during pregnancy. It is excreted in breast milk.
Ivermectin Counseling:  Patient instructed to take medication on an empty stomach with a full glass of water.  Patient informed of potential adverse effects including but not limited to nausea, diarrhea, dizziness, itching, and swelling of the extremities or lymph nodes.  The patient verbalized understanding of the proper use and possible adverse effects of ivermectin.  All of the patient's questions and concerns were addressed.
Cantharidin Counseling: Calcipotriene Counseling:  I discussed with the patient the risks of calcipotriene including but not limited to erythema, scaling, itching, and irritation.
Valtrex Pregnancy And Lactation Text: this medication is Pregnancy Category B and is considered safe during pregnancy. This medication is not directly found in breast milk but it's metabolite acyclovir is present.
Clindamycin Pregnancy And Lactation Text: This medication can be used in pregnancy if certain situations. Clindamycin is also present in breast milk.
Tremfya Counseling: I discussed with the patient the risks of guselkumab including but not limited to immunosuppression, serious infections, and drug reactions.  The patient understands that monitoring is required including a PPD at baseline and must alert us or the primary physician if symptoms of infection or other concerning signs are noted.
Rinvoq Counseling: I discussed with the patient the risks of Rinvoq therapy including but not limited to upper respiratory tract infections, shingles, cold sores, bronchitis, nausea, cough, fever, acne, and headache. Live vaccines should be avoided.  This medication has been linked to serious infections; higher rate of mortality; malignancy and lymphoproliferative disorders; major adverse cardiovascular events; thrombosis; thrombocytopenia, anemia, and neutropenia; lipid elevations; liver enzyme elevations; and gastrointestinal perforations.
Fluconazole Pregnancy And Lactation Text: This medication is Pregnancy Category C and it isn't know if it is safe during pregnancy. It is also excreted in breast milk.
Mirvaso Pregnancy And Lactation Text: This medication has not been assigned a Pregnancy Risk Category. It is unknown if the medication is excreted in breast milk.
Dupixent Pregnancy And Lactation Text: This medication likely crosses the placenta but the risk for the fetus is uncertain. This medication is excreted in breast milk.
Winlevi Pregnancy And Lactation Text: This medication is considered safe during pregnancy and breastfeeding.
Cimetidine Counseling:  I discussed with the patient the risks of Cimetidine including but not limited to gynecomastia, headache, diarrhea, nausea, drowsiness, arrhythmias, pancreatitis, skin rashes, psychosis, bone marrow suppression and kidney toxicity.
Hydroquinone Counseling:  Patient advised that medication may result in skin irritation, lightening (hypopigmentation), dryness, and burning.  In the event of skin irritation, the patient was advised to reduce the amount of the drug applied or use it less frequently.  Rarely, spots that are treated with hydroquinone can become darker (pseudoochronosis).  Should this occur, patient instructed to stop medication and call the office. The patient verbalized understanding of the proper use and possible adverse effects of hydroquinone.  All of the patient's questions and concerns were addressed.
Odomzo Pregnancy And Lactation Text: This medication is Pregnancy Category X and is absolutely contraindicated during pregnancy. It is unknown if it is excreted in breast milk.
Propranolol Counseling:  I discussed with the patient the risks of propranolol including but not limited to low heart rate, low blood pressure, low blood sugar, restlessness and increased cold sensitivity. They should call the office if they experience any of these side effects.
Bexarotene Pregnancy And Lactation Text: This medication is Pregnancy Category X and should not be given to women who are pregnant or may become pregnant. This medication should not be used if you are breast feeding.
Nsaids Pregnancy And Lactation Text: These medications are considered safe up to 30 weeks gestation. It is excreted in breast milk.
Methotrexate Pregnancy And Lactation Text: This medication is Pregnancy Category X and is known to cause fetal harm. This medication is excreted in breast milk.
Doxycycline Counseling:  Patient counseled regarding possible photosensitivity and increased risk for sunburn.  Patient instructed to avoid sunlight, if possible.  When exposed to sunlight, patients should wear protective clothing, sunglasses, and sunscreen.  The patient was instructed to call the office immediately if the following severe adverse effects occur:  hearing changes, easy bruising/bleeding, severe headache, or vision changes.  The patient verbalized understanding of the proper use and possible adverse effects of doxycycline.  All of the patient's questions and concerns were addressed.
Glycopyrrolate Counseling:  I discussed with the patient the risks of glycopyrrolate including but not limited to skin rash, drowsiness, dry mouth, difficulty urinating, and blurred vision.
Azelaic Acid Counseling: Patient counseled that medicine may cause skin irritation and to avoid applying near the eyes.  In the event of skin irritation, the patient was advised to reduce the amount of the drug applied or use it less frequently.   The patient verbalized understanding of the proper use and possible adverse effects of azelaic acid.  All of the patient's questions and concerns were addressed.
Cantharidin Pregnancy And Lactation Text: The use of this medication during pregnancy or lactation is not recommended as there is insufficient data.
Topical Ketoconazole Counseling: Patient counseled that this medication may cause skin irritation or allergic reactions.  In the event of skin irritation, the patient was advised to reduce the amount of the drug applied or use it less frequently.   The patient verbalized understanding of the proper use and possible adverse effects of ketoconazole.  All of the patient's questions and concerns were addressed.
Enbrel Counseling:  I discussed with the patient the risks of etanercept including but not limited to myelosuppression, immunosuppression, autoimmune hepatitis, demyelinating diseases, lymphoma, and infections.  The patient understands that monitoring is required including a PPD at baseline and must alert us or the primary physician if symptoms of infection or other concerning signs are noted.
Opzelura Counseling:  I discussed with the patient the risks of Opzelura including but not limited to nasopharngitis, bronchitis, ear infection, eosinophila, hives, diarrhea, folliculitis, tonsillitis, and rhinorrhea.  Taken orally, this medication has been linked to serious infections; higher rate of mortality; malignancy and lymphoproliferative disorders; major adverse cardiovascular events; thrombosis; thrombocytopenia, anemia, and neutropenia; and lipid elevations.
Griseofulvin Counseling:  I discussed with the patient the risks of griseofulvin including but not limited to photosensitivity, cytopenia, liver damage, nausea/vomiting and severe allergy.  The patient understands that this medication is best absorbed when taken with a fatty meal (e.g., ice cream or french fries).
Azathioprine Counseling:  I discussed with the patient the risks of azathioprine including but not limited to myelosuppression, immunosuppression, hepatotoxicity, lymphoma, and infections.  The patient understands that monitoring is required including baseline LFTs, Creatinine, possible TPMP genotyping and weekly CBCs for the first month and then every 2 weeks thereafter.  The patient verbalized understanding of the proper use and possible adverse effects of azathioprine.  All of the patient's questions and concerns were addressed.
Propranolol Pregnancy And Lactation Text: This medication is Pregnancy Category C and it isn't known if it is safe during pregnancy. It is excreted in breast milk.
Simponi Counseling:  I discussed with the patient the risks of golimumab including but not limited to myelosuppression, immunosuppression, autoimmune hepatitis, demyelinating diseases, lymphoma, and serious infections.  The patient understands that monitoring is required including a PPD at baseline and must alert us or the primary physician if symptoms of infection or other concerning signs are noted.
Rinvoq Pregnancy And Lactation Text: Based on animal studies, Rinvoq may cause embryo-fetal harm when administered to pregnant women.  The medication should not be used in pregnancy.  Breastfeeding is not recommended during treatment and for 6 days after the last dose.
Isotretinoin Counseling: Patient should get monthly blood tests, not donate blood, not drive at night if vision affected, not share medication, and not undergo elective surgery for 6 months after tx completed. Side effects reviewed, pt to contact office should one occur.
5-Fu Counseling: 5-Fluorouracil Counseling:  I discussed with the patient the risks of 5-fluorouracil including but not limited to erythema, scaling, itching, weeping, crusting, and pain.
Olanzapine Counseling- I discussed with the patient the common side effects of olanzapine including but are not limited to: lack of energy, dry mouth, increased appetite, sleepiness, tremor, constipation, dizziness, changes in behavior, or restlessness.  Explained that teenagers are more likely to experience headaches, abdominal pain, pain in the arms or legs, tiredness, and sleepiness.  Serious side effects include but are not limited: increased risk of death in elderly patients who are confused, have memory loss, or dementia-related psychosis; hyperglycemia; increased cholesterol and triglycerides; and weight gain.
Doxycycline Pregnancy And Lactation Text: This medication is Pregnancy Category D and not consider safe during pregnancy. It is also excreted in breast milk but is considered safe for shorter treatment courses.
Prednisone Counseling:  I discussed with the patient the risks of prolonged use of prednisone including but not limited to weight gain, insomnia, osteoporosis, mood changes, diabetes, susceptibility to infection, glaucoma and high blood pressure.  In cases where prednisone use is prolonged, patients should be monitored with blood pressure checks, serum glucose levels and an eye exam.  Additionally, the patient may need to be placed on GI prophylaxis, PCP prophylaxis, and calcium and vitamin D supplementation and/or a bisphosphonate.  The patient verbalized understanding of the proper use and the possible adverse effects of prednisone.  All of the patient's questions and concerns were addressed.
VTAMA Counseling: I discussed with the patient that VTAMA is not for use in the eyes, mouth or mouth. They should call the office if they develop any signs of allergic reactions to VTAMA. The patient verbalized understanding of the proper use and possible adverse effects of VTAMA.  All of the patient's questions and concerns were addressed.
Glycopyrrolate Pregnancy And Lactation Text: This medication is Pregnancy Category B and is considered safe during pregnancy. It is unknown if it is excreted breast milk.
Azithromycin Counseling:  I discussed with the patient the risks of azithromycin including but not limited to GI upset, allergic reaction, drug rash, diarrhea, and yeast infections.
Clofazimine Counseling:  I discussed with the patient the risks of clofazimine including but not limited to skin and eye pigmentation, liver damage, nausea/vomiting, gastrointestinal bleeding and allergy.
Dutasteride Male Counseling: Dustasteride Counseling:  I discussed with the patient the risks of use of dutasteride including but not limited to decreased libido, decreased ejaculate volume, and gynecomastia. Women who can become pregnant should not handle medication.  All of the patient's questions and concerns were addressed.
Opzelura Pregnancy And Lactation Text: There is insufficient data to evaluate drug-associated risk for major birth defects, miscarriage, or other adverse maternal or fetal outcomes.  There is a pregnancy registry that monitors pregnancy outcomes in pregnant persons exposed to the medication during pregnancy.  It is unknown if this medication is excreted in breast milk.  Do not breastfeed during treatment and for about 4 weeks after the last dose.
Enbrel Pregnancy And Lactation Text: This medication is Pregnancy Category B and is considered safe during pregnancy. It is unknown if this medication is excreted in breast milk.
Rifampin Counseling: I discussed with the patient the risks of rifampin including but not limited to liver damage, kidney damage, red-orange body fluids, nausea/vomiting and severe allergy.
Solaraze Counseling:  I discussed with the patient the risks of Solaraze including but not limited to erythema, scaling, itching, weeping, crusting, and pain.
Adbry Counseling: I discussed with the patient the risks of tralokinumab including but not limited to eye infection and irritation, cold sores, injection site reactions, worsening of asthma, allergic reactions and increased risk of parasitic infection.  Live vaccines should be avoided while taking tralokinumab. The patient understands that monitoring is required and they must alert us or the primary physician if symptoms of infection or other concerning signs are noted.
Griseofulvin Pregnancy And Lactation Text: This medication is Pregnancy Category X and is known to cause serious birth defects. It is unknown if this medication is excreted in breast milk but breast feeding should be avoided.
Azathioprine Pregnancy And Lactation Text: This medication is Pregnancy Category D and isn't considered safe during pregnancy. It is unknown if this medication is excreted in breast milk.
Azelaic Acid Pregnancy And Lactation Text: This medication is considered safe during pregnancy and breast feeding.
SSKI Counseling:  I discussed with the patient the risks of SSKI including but not limited to thyroid abnormalities, metallic taste, GI upset, fever, headache, acne, arthralgias, paraesthesias, lymphadenopathy, easy bleeding, arrhythmias, and allergic reaction.
Sotyktu Counseling:  I discussed the most common side effects of Sotyktu including: common cold, sore throat, sinus infections, cold sores, canker sores, folliculitis, and acne.  I also discussed more serious side effects of Sotyktu including but not limited to: serious allergic reactions; increased risk for infections such as TB; cancers such as lymphomas; rhabdomyolysis and elevated CPK; and elevated triglycerides and liver enzymes. 
Xolair Counseling:  Patient informed of potential adverse effects including but not limited to fever, muscle aches, rash and allergic reactions.  The patient verbalized understanding of the proper use and possible adverse effects of Xolair.  All of the patient's questions and concerns were addressed.
Doxepin Counseling:  Patient advised that the medication is sedating and not to drive a car after taking this medication. Patient informed of potential adverse effects including but not limited to dry mouth, urinary retention, and blurry vision.  The patient verbalized understanding of the proper use and possible adverse effects of doxepin.  All of the patient's questions and concerns were addressed.
Topical Steroids Counseling: I discussed with the patient that prolonged use of topical steroids can result in the increased appearance of superficial blood vessels (telangiectasias), lightening (hypopigmentation) and thinning of the skin (atrophy).  Patient understands to avoid using high potency steroids in skin folds, the groin or the face.  The patient verbalized understanding of the proper use and possible adverse effects of topical steroids.  All of the patient's questions and concerns were addressed.
Vtama Pregnancy And Lactation Text: It is unknown if this medication can cause problems during pregnancy and breastfeeding.
Imiquimod Counseling:  I discussed with the patient the risks of imiquimod including but not limited to erythema, scaling, itching, weeping, crusting, and pain.  Patient understands that the inflammatory response to imiquimod is variable from person to person and was educated regarded proper titration schedule.  If flu-like symptoms develop, patient knows to discontinue the medication and contact us.
Isotretinoin Pregnancy And Lactation Text: This medication is Pregnancy Category X and is considered extremely dangerous during pregnancy. It is unknown if it is excreted in breast milk.
Dutasteride Pregnancy And Lactation Text: This medication is absolutely contraindicated in women, especially during pregnancy and breast feeding. Feminization of male fetuses is possible if taking while pregnant.
Solaraze Pregnancy And Lactation Text: This medication is Pregnancy Category B and is considered safe. There is some data to suggest avoiding during the third trimester. It is unknown if this medication is excreted in breast milk.
Azithromycin Pregnancy And Lactation Text: This medication is considered safe during pregnancy and is also secreted in breast milk.
Olanzapine Pregnancy And Lactation Text: This medication is pregnancy category C.   There are no adequate and well controlled trials with olanzapine in pregnant females.  Olanzapine should be used during pregnancy only if the potential benefit justifies the potential risk to the fetus.   In a study in lactating healthy women, olanzapine was excreted in breast milk.  It is recommended that women taking olanzapine should not breast feed.
Hydroxychloroquine Counseling:  I discussed with the patient that a baseline ophthalmologic exam is needed at the start of therapy and every year thereafter while on therapy. A CBC may also be warranted for monitoring.  The side effects of this medication were discussed with the patient, including but not limited to agranulocytosis, aplastic anemia, seizures, rashes, retinopathy, and liver toxicity. Patient instructed to call the office should any adverse effect occur.  The patient verbalized understanding of the proper use and possible adverse effects of Plaquenil.  All the patient's questions and concerns were addressed.
Rifampin Pregnancy And Lactation Text: This medication is Pregnancy Category C and it isn't know if it is safe during pregnancy. It is also excreted in breast milk and should not be used if you are breast feeding.
Sotyktu Pregnancy And Lactation Text: There is insufficient data to evaluate whether or not Sotyktu is safe to use during pregnancy.   It is not known if Sotyktu passes into breast milk and whether or not it is safe to use when breastfeeding.  
Humira Counseling:  I discussed with the patient the risks of adalimumab including but not limited to myelosuppression, immunosuppression, autoimmune hepatitis, demyelinating diseases, lymphoma, and serious infections.  The patient understands that monitoring is required including a PPD at baseline and must alert us or the primary physician if symptoms of infection or other concerning signs are noted.
Xolair Pregnancy And Lactation Text: This medication is Pregnancy Category B and is considered safe during pregnancy. This medication is excreted in breast milk.
Erythromycin Counseling:  I discussed with the patient the risks of erythromycin including but not limited to GI upset, allergic reaction, drug rash, diarrhea, increase in liver enzymes, and yeast infections.
Benzoyl Peroxide Counseling: Patient counseled that medicine may cause skin irritation and bleach clothing.  In the event of skin irritation, the patient was advised to reduce the amount of the drug applied or use it less frequently.   The patient verbalized understanding of the proper use and possible adverse effects of benzoyl peroxide.  All of the patient's questions and concerns were addressed.
Cellcept Counseling:  I discussed with the patient the risks of mycophenolate mofetil including but not limited to infection/immunosuppression, GI upset, hypokalemia, hypercholesterolemia, bone marrow suppression, lymphoproliferative disorders, malignancy, GI ulceration/bleed/perforation, colitis, interstitial lung disease, kidney failure, progressive multifocal leukoencephalopathy, and birth defects.  The patient understands that monitoring is required including a baseline creatinine and regular CBC testing. In addition, patient must alert us immediately if symptoms of infection or other concerning signs are noted.
Imiquimod Pregnancy And Lactation Text: This medication is Pregnancy Category C. It is unknown if this medication is excreted in breast milk.
Adbry Pregnancy And Lactation Text: It is unknown if this medication will adversely affect pregnancy or breast feeding.
High Dose Vitamin A Counseling: Side effects reviewed, pt to contact office should one occur.
Sski Pregnancy And Lactation Text: This medication is Pregnancy Category D and isn't considered safe during pregnancy. It is excreted in breast milk.
Itraconazole Counseling:  I discussed with the patient the risks of itraconazole including but not limited to liver damage, nausea/vomiting, neuropathy, and severe allergy.  The patient understands that this medication is best absorbed when taken with acidic beverages such as non-diet cola or ginger ale.  The patient understands that monitoring is required including baseline LFTs and repeat LFTs at intervals.  The patient understands that they are to contact us or the primary physician if concerning signs are noted.
Picato Counseling:  I discussed with the patient the risks of Picato including but not limited to erythema, scaling, itching, weeping, crusting, and pain.
Skyrizi Counseling: I discussed with the patient the risks of risankizumab-rzaa including but not limited to immunosuppression, and serious infections.  The patient understands that monitoring is required including a PPD at baseline and must alert us or the primary physician if symptoms of infection or other concerning signs are noted.
Oral Minoxidil Counseling- I discussed with the patient the risks of oral minoxidil including but not limited to shortness of breath, swelling of the feet or ankles, dizziness, lightheadedness, unwanted hair growth and allergic reaction.  The patient verbalized understanding of the proper use and possible adverse effects of oral minoxidil.  All of the patient's questions and concerns were addressed.
Topical Retinoid counseling:  Patient advised to apply a pea-sized amount only at bedtime and wait 30 minutes after washing their face before applying.  If too drying, patient may add a non-comedogenic moisturizer. The patient verbalized understanding of the proper use and possible adverse effects of retinoids.  All of the patient's questions and concerns were addressed.
Doxepin Pregnancy And Lactation Text: This medication is Pregnancy Category C and it isn't known if it is safe during pregnancy. It is also excreted in breast milk and breast feeding isn't recommended.
Zoryve Counseling:  I discussed with the patient that Zoryve is not for use in the eyes, mouth or vagina. The most commonly reported side effects include diarrhea, headache, insomnia, application site pain, upper respiratory tract infections, and urinary tract infections.  All of the patient's questions and concerns were addressed.
Drysol Counseling:  I discussed with the patient the risks of drysol/aluminum chloride including but not limited to skin rash, itching, irritation, burning.
Topical Steroids Applications Pregnancy And Lactation Text: Most topical steroids are considered safe to use during pregnancy and lactation.  Any topical steroid applied to the breast or nipple should be washed off before breastfeeding.
Benzoyl Peroxide Pregnancy And Lactation Text: This medication is Pregnancy Category C. It is unknown if benzoyl peroxide is excreted in breast milk.
Hydroxychloroquine Pregnancy And Lactation Text: This medication has been shown to cause fetal harm but it isn't assigned a Pregnancy Risk Category. There are small amounts excreted in breast milk.
Bactrim Counseling:  I discussed with the patient the risks of sulfa antibiotics including but not limited to GI upset, allergic reaction, drug rash, diarrhea, dizziness, photosensitivity, and yeast infections.  Rarely, more serious reactions can occur including but not limited to aplastic anemia, agranulocytosis, methemoglobinemia, blood dyscrasias, liver or kidney failure, lung infiltrates or desquamative/blistering drug rashes.
Erythromycin Pregnancy And Lactation Text: This medication is Pregnancy Category B and is considered safe during pregnancy. It is also excreted in breast milk.
Colchicine Counseling:  Patient counseled regarding adverse effects including but not limited to stomach upset (nausea, vomiting, stomach pain, or diarrhea).  Patient instructed to limit alcohol consumption while taking this medication.  Colchicine may reduce blood counts especially with prolonged use.  The patient understands that monitoring of kidney function and blood counts may be required, especially at baseline. The patient verbalized understanding of the proper use and possible adverse effects of colchicine.  All of the patient's questions and concerns were addressed.
Sarecycline Counseling: Patient advised regarding possible photosensitivity and discoloration of the teeth, skin, lips, tongue and gums.  Patient instructed to avoid sunlight, if possible.  When exposed to sunlight, patients should wear protective clothing, sunglasses, and sunscreen.  The patient was instructed to call the office immediately if the following severe adverse effects occur:  hearing changes, easy bruising/bleeding, severe headache, or vision changes.  The patient verbalized understanding of the proper use and possible adverse effects of sarecycline.  All of the patient's questions and concerns were addressed.
Finasteride Male Counseling: Finasteride Counseling:  I discussed with the patient the risks of use of finasteride including but not limited to decreased libido, decreased ejaculate volume, gynecomastia, and depression. Women should not handle medication.  All of the patient's questions and concerns were addressed.
Thalidomide Counseling: I discussed with the patient the risks of thalidomide including but not limited to birth defects, anxiety, weakness, chest pain, dizziness, cough and severe allergy.
Klisyri Counseling:  I discussed with the patient the risks of Klisyri including but not limited to erythema, scaling, itching, weeping, crusting, and pain.
Cimzia Counseling:  I discussed with the patient the risks of Cimzia including but not limited to immunosuppression, allergic reactions and infections.  The patient understands that monitoring is required including a PPD at baseline and must alert us or the primary physician if symptoms of infection or other concerning signs are noted.
Xeljanz Counseling: I discussed with the patient the risks of Xeljanz therapy including increased risk of infection, liver issues, headache, diarrhea, or cold symptoms. Live vaccines should be avoided. They were instructed to call if they have any problems.
Hydroxyzine Counseling: Patient advised that the medication is sedating and not to drive a car after taking this medication.  Patient informed of potential adverse effects including but not limited to dry mouth, urinary retention, and blurry vision.  The patient verbalized understanding of the proper use and possible adverse effects of hydroxyzine.  All of the patient's questions and concerns were addressed.
Topical Sulfur Applications Counseling: Topical Sulfur Counseling: Patient counseled that this medication may cause skin irritation or allergic reactions.  In the event of skin irritation, the patient was advised to reduce the amount of the drug applied or use it less frequently.   The patient verbalized understanding of the proper use and possible adverse effects of topical sulfur application.  All of the patient's questions and concerns were addressed.
High Dose Vitamin A Pregnancy And Lactation Text: High dose vitamin A therapy is contraindicated during pregnancy and breast feeding.
Erivedge Counseling- I discussed with the patient the risks of Erivedge including but not limited to nausea, vomiting, diarrhea, constipation, weight loss, changes in the sense of taste, decreased appetite, muscle spasms, and hair loss.  The patient verbalized understanding of the proper use and possible adverse effects of Erivedge.  All of the patient's questions and concerns were addressed.
Oral Minoxidil Pregnancy And Lactation Text: This medication should only be used when clearly needed if you are pregnant, attempting to become pregnant or breast feeding.
Low Dose Naltrexone Counseling- I discussed with the patient the potential risks and side effects of low dose naltrexone including but not limited to: more vivid dreams, headaches, nausea, vomiting, abdominal pain, fatigue, dizziness, and anxiety.
Metronidazole Counseling:  I discussed with the patient the risks of metronidazole including but not limited to seizures, nausea/vomiting, a metallic taste in the mouth, nausea/vomiting and severe allergy.
Carac Counseling:  I discussed with the patient the risks of Carac including but not limited to erythema, scaling, itching, weeping, crusting, and pain.
Finasteride Pregnancy And Lactation Text: This medication is absolutely contraindicated during pregnancy. It is unknown if it is excreted in breast milk.
Detail Level: Simple
Protopic Counseling: Patient may experience a mild burning sensation during topical application. Protopic is not approved in children less than 2 years of age. There have been case reports of hematologic and skin malignancies in patients using topical calcineurin inhibitors although causality is questionable.
Ilumya Counseling: I discussed with the patient the risks of tildrakizumab including but not limited to immunosuppression, malignancy, posterior leukoencephalopathy syndrome, and serious infections.  The patient understands that monitoring is required including a PPD at baseline and must alert us or the primary physician if symptoms of infection or other concerning signs are noted.
Cyclophosphamide Counseling:  I discussed with the patient the risks of cyclophosphamide including but not limited to hair loss, hormonal abnormalities, decreased fertility, abdominal pain, diarrhea, nausea and vomiting, bone marrow suppression and infection. The patient understands that monitoring is required while taking this medication.
Bactrim Pregnancy And Lactation Text: This medication is Pregnancy Category D and is known to cause fetal risk.  It is also excreted in breast milk.
Xelnoryz Pregnancy And Lactation Text: This medication is Pregnancy Category D and is not considered safe during pregnancy.  The risk during breast feeding is also uncertain.
Ketoconazole Counseling:   Patient counseled regarding improving absorption with orange juice.  Adverse effects include but are not limited to breast enlargement, headache, diarrhea, nausea, upset stomach, liver function test abnormalities, taste disturbance, and stomach pain.  There is a rare possibility of liver failure that can occur when taking ketoconazole. The patient understands that monitoring of LFTs may be required, especially at baseline. The patient verbalized understanding of the proper use and possible adverse effects of ketoconazole.  All of the patient's questions and concerns were addressed.
Hydroxyzine Pregnancy And Lactation Text: This medication is not safe during pregnancy and should not be taken. It is also excreted in breast milk and breast feeding isn't recommended.
Cimzia Pregnancy And Lactation Text: This medication crosses the placenta but can be considered safe in certain situations. Cimzia may be excreted in breast milk.
Stelara Counseling:  I discussed with the patient the risks of ustekinumab including but not limited to immunosuppression, malignancy, posterior leukoencephalopathy syndrome, and serious infections.  The patient understands that monitoring is required including a PPD at baseline and must alert us or the primary physician if symptoms of infection or other concerning signs are noted.
Cibinqo Counseling: I discussed with the patient the risks of Cibinqo therapy including but not limited to common cold, nausea, headache, cold sores, increased blood CPK levels, dizziness, UTIs, fatigue, acne, and vomitting. Live vaccines should be avoided.  This medication has been linked to serious infections; higher rate of mortality; malignancy and lymphoproliferative disorders; major adverse cardiovascular events; thrombosis; thrombocytopenia and lymphopenia; lipid elevations; and retinal detachment.
Klisyri Pregnancy And Lactation Text: It is unknown if this medication can harm a developing fetus or if it is excreted in breast milk.
Topical Sulfur Applications Pregnancy And Lactation Text: This medication is Pregnancy Category C and has an unknown safety profile during pregnancy. It is unknown if this topical medication is excreted in breast milk.
Elidel Counseling: Patient may experience a mild burning sensation during topical application. Elidel is not approved in children less than 2 years of age. There have been case reports of hematologic and skin malignancies in patients using topical calcineurin inhibitors although causality is questionable.
Metronidazole Pregnancy And Lactation Text: This medication is Pregnancy Category B and considered safe during pregnancy.  It is also excreted in breast milk.
Otezla Counseling: The side effects of Otezla were discussed with the patient, including but not limited to worsening or new depression, weight loss, diarrhea, nausea, upper respiratory tract infection, and headache. Patient instructed to call the office should any adverse effect occur.  The patient verbalized understanding of the proper use and possible adverse effects of Otezla.  All the patient's questions and concerns were addressed.
Zyclara Counseling:  I discussed with the patient the risks of imiquimod including but not limited to erythema, scaling, itching, weeping, crusting, and pain.  Patient understands that the inflammatory response to imiquimod is variable from person to person and was educated regarded proper titration schedule.  If flu-like symptoms develop, patient knows to discontinue the medication and contact us.
Cephalexin Counseling: I counseled the patient regarding use of cephalexin as an antibiotic for prophylactic and/or therapeutic purposes. Cephalexin (commonly prescribed under brand name Keflex) is a cephalosporin antibiotic which is active against numerous classes of bacteria, including most skin bacteria. Side effects may include nausea, diarrhea, gastrointestinal upset, rash, hives, yeast infections, and in rare cases, hepatitis, kidney disease, seizures, fever, confusion, neurologic symptoms, and others. Patients with severe allergies to penicillin medications are cautioned that there is about a 10% incidence of cross-reactivity with cephalosporins. When possible, patients with penicillin allergies should use alternatives to cephalosporins for antibiotic therapy.
Low Dose Naltrexone Pregnancy And Lactation Text: Naltrexone is pregnancy category C.  There have been no adequate and well-controlled studies in pregnant women.  It should be used in pregnancy only if the potential benefit justifies the potential risk to the fetus.   Limited data indicates that naltrexone is minimally excreted into breastmilk.
Dapsone Counseling: I discussed with the patient the risks of dapsone including but not limited to hemolytic anemia, agranulocytosis, rashes, methemoglobinemia, kidney failure, peripheral neuropathy, headaches, GI upset, and liver toxicity.  Patients who start dapsone require monitoring including baseline LFTs and weekly CBCs for the first month, then every month thereafter.  The patient verbalized understanding of the proper use and possible adverse effects of dapsone.  All of the patient's questions and concerns were addressed.
Birth Control Pills Counseling: Birth Control Pill Counseling: I discussed with the patient the potential side effects of OCPs including but not limited to increased risk of stroke, heart attack, thrombophlebitis, deep venous thrombosis, hepatic adenomas, breast changes, GI upset, headaches, and depression.  The patient verbalized understanding of the proper use and possible adverse effects of OCPs. All of the patient's questions and concerns were addressed.
Protopic Pregnancy And Lactation Text: This medication is Pregnancy Category C. It is unknown if this medication is excreted in breast milk when applied topically.
Opioid Counseling: I discussed with the patient the potential side effects of opioids including but not limited to addiction, altered mental status, and depression. I stressed avoiding alcohol, benzodiazepines, muscle relaxants and sleep aids unless specifically okayed by a physician. The patient verbalized understanding of the proper use and possible adverse effects of opioids. All of the patient's questions and concerns were addressed. They were instructed to flush the remaining pills down the toilet if they did not need them for pain.
Tetracycline Counseling: Patient counseled regarding possible photosensitivity and increased risk for sunburn.  Patient instructed to avoid sunlight, if possible.  When exposed to sunlight, patients should wear protective clothing, sunglasses, and sunscreen.  The patient was instructed to call the office immediately if the following severe adverse effects occur:  hearing changes, easy bruising/bleeding, severe headache, or vision changes.  The patient verbalized understanding of the proper use and possible adverse effects of tetracycline.  All of the patient's questions and concerns were addressed. Patient understands to avoid pregnancy while on therapy due to potential birth defects.
Tazorac Counseling:  Patient advised that medication is irritating and drying.  Patient may need to apply sparingly and wash off after an hour before eventually leaving it on overnight.  The patient verbalized understanding of the proper use and possible adverse effects of tazorac.  All of the patient's questions and concerns were addressed.
Minoxidil Counseling: Minoxidil is a topical medication which can increase blood flow where it is applied. It is uncertain how this medication increases hair growth. Side effects are uncommon and include stinging and allergic reactions.
Cosentyx Counseling:  I discussed with the patient the risks of Cosentyx including but not limited to worsening of Crohn's disease, immunosuppression, allergic reactions and infections.  The patient understands that monitoring is required including a PPD at baseline and must alert us or the primary physician if symptoms of infection or other concerning signs are noted.
Ketoconazole Pregnancy And Lactation Text: This medication is Pregnancy Category C and it isn't know if it is safe during pregnancy. It is also excreted in breast milk and breast feeding isn't recommended.
Cyclophosphamide Pregnancy And Lactation Text: This medication is Pregnancy Category D and it isn't considered safe during pregnancy. This medication is excreted in breast milk.
Tranexamic Acid Counseling:  Patient advised of the small risk of bleeding problems with tranexamic acid. They were also instructed to call if they developed any nausea, vomiting or diarrhea. All of the patient's questions and concerns were addressed.
Libtayo Counseling- I discussed with the patient the risks of Libtayo including but not limited to nausea, vomiting, diarrhea, and bone or muscle pain.  The patient verbalized understanding of the proper use and possible adverse effects of Libtayo.  All of the patient's questions and concerns were addressed.
Otezla Pregnancy And Lactation Text: This medication is Pregnancy Category C and it isn't known if it is safe during pregnancy. It is unknown if it is excreted in breast milk.
Rituxan Counseling:  I discussed with the patient the risks of Rituxan infusions. Side effects can include infusion reactions, severe drug rashes including mucocutaneous reactions, reactivation of latent hepatitis and other infections and rarely progressive multifocal leukoencephalopathy.  All of the patient's questions and concerns were addressed.
Cibinqo Pregnancy And Lactation Text: It is unknown if this medication will adversely affect pregnancy or breast feeding.  You should not take this medication if you are currently pregnant or planning a pregnancy or while breastfeeding.
Tazorac Pregnancy And Lactation Text: This medication is not safe during pregnancy. It is unknown if this medication is excreted in breast milk.
Opioid Pregnancy And Lactation Text: These medications can lead to premature delivery and should be avoided during pregnancy. These medications are also present in breast milk in small amounts.
Albendazole Counseling:  I discussed with the patient the risks of albendazole including but not limited to cytopenia, kidney damage, nausea/vomiting and severe allergy.  The patient understands that this medication is being used in an off-label manner.
Cephalexin Pregnancy And Lactation Text: This medication is Pregnancy Category B and considered safe during pregnancy.  It is also excreted in breast milk but can be used safely for shorter doses.
Acitretin Counseling:  I discussed with the patient the risks of acitretin including but not limited to hair loss, dry lips/skin/eyes, liver damage, hyperlipidemia, depression/suicidal ideation, photosensitivity.  Serious rare side effects can include but are not limited to pancreatitis, pseudotumor cerebri, bony changes, clot formation/stroke/heart attack.  Patient understands that alcohol is contraindicated since it can result in liver toxicity and significantly prolong the elimination of the drug by many years.
Niacinamide Counseling: I recommended taking niacin or niacinamide, also know as vitamin B3, twice daily. Recent evidence suggests that taking vitamin B3 (500 mg twice daily) can reduce the risk of actinic keratoses and non-melanoma skin cancers. Side effects of vitamin B3 include flushing and headache.
Wartpeel Counseling:  I discussed with the patient the risks of Wartpeel including but not limited to erythema, scaling, itching, weeping, crusting, and pain.
Birth Control Pills Pregnancy And Lactation Text: This medication should be avoided if pregnant and for the first 30 days post-partum.
Dapsone Pregnancy And Lactation Text: This medication is Pregnancy Category C and is not considered safe during pregnancy or breast feeding.
Infliximab Counseling:  I discussed with the patient the risks of infliximab including but not limited to myelosuppression, immunosuppression, autoimmune hepatitis, demyelinating diseases, lymphoma, and serious infections.  The patient understands that monitoring is required including a PPD at baseline and must alert us or the primary physician if symptoms of infection or other concerning signs are noted.
Minocycline Counseling: Patient advised regarding possible photosensitivity and discoloration of the teeth, skin, lips, tongue and gums.  Patient instructed to avoid sunlight, if possible.  When exposed to sunlight, patients should wear protective clothing, sunglasses, and sunscreen.  The patient was instructed to call the office immediately if the following severe adverse effects occur:  hearing changes, easy bruising/bleeding, severe headache, or vision changes.  The patient verbalized understanding of the proper use and possible adverse effects of minocycline.  All of the patient's questions and concerns were addressed.
Qbrexza Counseling:  I discussed with the patient the risks of Qbrexza including but not limited to headache, mydriasis, blurred vision, dry eyes, nasal dryness, dry mouth, dry throat, dry skin, urinary hesitation, and constipation.  Local skin reactions including erythema, burning, stinging, and itching can also occur.
Cyclosporine Counseling:  I discussed with the patient the risks of cyclosporine including but not limited to hypertension, gingival hyperplasia,myelosuppression, immunosuppression, liver damage, kidney damage, neurotoxicity, lymphoma, and serious infections. The patient understands that monitoring is required including baseline blood pressure, CBC, CMP, lipid panel and uric acid, and then 1-2 times monthly CMP and blood pressure.
Tranexamic Acid Pregnancy And Lactation Text: It is unknown if this medication is safe during pregnancy or breast feeding.
Terbinafine Counseling: Patient counseling regarding adverse effects of terbinafine including but not limited to headache, diarrhea, rash, upset stomach, liver function test abnormalities, itching, taste/smell disturbance, nausea, abdominal pain, and flatulence.  There is a rare possibility of liver failure that can occur when taking terbinafine.  The patient understands that a baseline LFT and kidney function test may be required. The patient verbalized understanding of the proper use and possible adverse effects of terbinafine.  All of the patient's questions and concerns were addressed.
Oxybutynin Counseling:  I discussed with the patient the risks of oxybutynin including but not limited to skin rash, drowsiness, dry mouth, difficulty urinating, and blurred vision.
Olumiant Counseling: I discussed with the patient the risks of Olumiant therapy including but not limited to upper respiratory tract infections, shingles, cold sores, and nausea. Live vaccines should be avoided.  This medication has been linked to serious infections; higher rate of mortality; malignancy and lymphoproliferative disorders; major adverse cardiovascular events; thrombosis; gastrointestinal perforations; neutropenia; lymphopenia; anemia; liver enzyme elevations; and lipid elevations.
Taltz Counseling: I discussed with the patient the risks of ixekizumab including but not limited to immunosuppression, serious infections, worsening of inflammatory bowel disease and drug reactions.  The patient understands that monitoring is required including a PPD at baseline and must alert us or the primary physician if symptoms of infection or other concerning signs are noted.

## 2023-02-17 NOTE — PROCEDURE: ADDITIONAL NOTES
Additional Notes: Lab requisition given\\nAdvised pt to be consistent with treatment regimen
Detail Level: Simple
Render Risk Assessment In Note?: no
Additional Notes: Reassurance given, advised pt to avoid scratching and moisturize daily

## 2023-03-17 ENCOUNTER — APPOINTMENT (RX ONLY)
Dept: URBAN - METROPOLITAN AREA OTHER 10 | Facility: OTHER | Age: 68
Setting detail: DERMATOLOGY
End: 2023-03-17

## 2023-03-17 DIAGNOSIS — T88.7XX: ICD-10-CM

## 2023-03-17 PROBLEM — T88.7XXS UNSPECIFIED ADVERSE EFFECT OF DRUG OR MEDICAMENT, SEQUELA: Status: ACTIVE | Noted: 2023-03-17

## 2023-03-17 PROCEDURE — ? COUNSELING

## 2023-03-17 PROCEDURE — 99214 OFFICE O/P EST MOD 30 MIN: CPT

## 2023-03-17 PROCEDURE — ? KOH PREP

## 2023-03-17 PROCEDURE — ? PRESCRIPTION MEDICATION MANAGEMENT

## 2023-03-17 PROCEDURE — ? ADDITIONAL NOTES

## 2023-03-17 PROCEDURE — ? MEDICATION COUNSELING

## 2023-03-17 ASSESSMENT — LOCATION SIMPLE DESCRIPTION DERM
LOCATION SIMPLE: LOWER BACK
LOCATION SIMPLE: RIGHT UPPER BACK

## 2023-03-17 ASSESSMENT — LOCATION ZONE DERM: LOCATION ZONE: TRUNK

## 2023-03-17 ASSESSMENT — LOCATION DETAILED DESCRIPTION DERM
LOCATION DETAILED: INFERIOR LUMBAR SPINE
LOCATION DETAILED: RIGHT MEDIAL UPPER BACK

## 2023-03-17 NOTE — PROCEDURE: ADDITIONAL NOTES
Detail Level: Simple
Render Risk Assessment In Note?: no
Additional Notes: Discussed lab results with patient and stated she need to follow up pcp concerning diabetes possible thyroid issues. Also concerning high protein in blood. Patient has hx of cancer.

## 2023-03-17 NOTE — PROCEDURE: PRESCRIPTION MEDICATION MANAGEMENT
Plan: Pt had a MRI of her liver in November 2022. Pt is scheduled for an ultrasound in May 2023. Pt was advised to follow up with oncologist to re-evaluate. Discussed labs in details about abnormal SPEP, elevated BG, and elevated thyroglobulin. Pt would need to FU w/ PCP for work up.
Render In Strict Bullet Format?: No
Detail Level: Zone
Discontinue Regimen: Hydroxyzine and Gabapentin
Modify Regimen: Clobetasol 0.05% ointment- apply to affected areas 3x/week for maintenance\\n-pt has rx remaining, declined rx

## 2023-03-17 NOTE — PROCEDURE: MEDICATION COUNSELING
Topical Clindamycin Counseling: Patient counseled that this medication may cause skin irritation or allergic reactions.  In the event of skin irritation, the patient was advised to reduce the amount of the drug applied or use it less frequently.   The patient verbalized understanding of the proper use and possible adverse effects of clindamycin.  All of the patient's questions and concerns were addressed.
Rituxan Pregnancy And Lactation Text: This medication is Pregnancy Category C and it isn't know if it is safe during pregnancy. It is unknown if this medication is excreted in breast milk but similar antibodies are known to be excreted.
Spironolactone Counseling: Patient advised regarding risks of diarrhea, abdominal pain, hyperkalemia, birth defects (for female patients), liver toxicity and renal toxicity. The patient may need blood work to monitor liver and kidney function and potassium levels while on therapy. The patient verbalized understanding of the proper use and possible adverse effects of spironolactone.  All of the patient's questions and concerns were addressed.
Eucrisa Counseling: Patient may experience a mild burning sensation during topical application. Eucrisa is not approved in children less than 2 years of age.
Acitretin Pregnancy And Lactation Text: This medication is Pregnancy Category X and should not be given to women who are pregnant or may become pregnant in the future. This medication is excreted in breast milk.
Wartpeel Pregnancy And Lactation Text: This medication is Pregnancy Category X and contraindicated in pregnancy and in women who may become pregnant. It is unknown if this medication is excreted in breast milk.
Libtayo Pregnancy And Lactation Text: This medication is contraindicated in pregnancy and when breast feeding.
Calcipotriene Pregnancy And Lactation Text: This medication has not been proven safe during pregnancy. It is unknown if this medication is excreted in breast milk.
Qbrexza Pregnancy And Lactation Text: There is no available data on Qbrexza use in pregnant women.  There is no available data on Qbrexza use in lactation.
Include Pregnancy/Lactation Warning?: No
Albendazole Pregnancy And Lactation Text: This medication is Pregnancy Category C and it isn't known if it is safe during pregnancy. It is also excreted in breast milk.
Clindamycin Counseling: I counseled the patient regarding use of clindamycin as an antibiotic for prophylactic and/or therapeutic purposes. Clindamycin is active against numerous classes of bacteria, including skin bacteria. Side effects may include nausea, diarrhea, gastrointestinal upset, rash, hives, yeast infections, and in rare cases, colitis.
Niacinamide Pregnancy And Lactation Text: These medications are considered safe during pregnancy.
Cyclosporine Pregnancy And Lactation Text: This medication is Pregnancy Category C and it isn't know if it is safe during pregnancy. This medication is excreted in breast milk.
Minocycline Pregnancy And Lactation Text: This medication is Pregnancy Category D and not consider safe during pregnancy. It is also excreted in breast milk.
Gabapentin Counseling: I discussed with the patient the risks of gabapentin including but not limited to dizziness, somnolence, fatigue and ataxia.
Terbinafine Pregnancy And Lactation Text: This medication is Pregnancy Category B and is considered safe during pregnancy. It is also excreted in breast milk and breast feeding isn't recommended.
Valtrex Counseling: I discussed with the patient the risks of valacyclovir including but not limited to kidney damage, nausea, vomiting and severe allergy.  The patient understands that if the infection seems to be worsening or is not improving, they are to call.
Olumiant Pregnancy And Lactation Text: Based on animal studies, Olumiant may cause embryo-fetal harm when administered to pregnant women.  The medication should not be used in pregnancy.  Breastfeeding is not recommended during treatment.
Mirvaso Counseling: Mirvaso is a topical medication which can decrease superficial blood flow where applied. Side effects are uncommon and include stinging, redness and allergic reactions.
Dupixent Counseling: I discussed with the patient the risks of dupilumab including but not limited to eye infection and irritation, cold sores, injection site reactions, worsening of asthma, allergic reactions and increased risk of parasitic infection.  Live vaccines should be avoided while taking dupilumab. Dupilumab will also interact with certain medications such as warfarin and cyclosporine. The patient understands that monitoring is required and they must alert us or the primary physician if symptoms of infection or other concerning signs are noted.
Taltz Pregnancy And Lactation Text: The risk during pregnancy and breastfeeding is uncertain with this medication.
Aklief counseling:  Patient advised to apply a pea-sized amount only at bedtime and wait 30 minutes after washing their face before applying.  If too drying, patient may add a non-comedogenic moisturizer.  The most commonly reported side effects including irritation, redness, scaling, dryness, stinging, burning, itching, and increased risk of sunburn.  The patient verbalized understanding of the proper use and possible adverse effects of retinoids.  All of the patient's questions and concerns were addressed.
Eucrisa Pregnancy And Lactation Text: This medication has not been assigned a Pregnancy Risk Category but animal studies failed to show danger with the topical medication. It is unknown if the medication is excreted in breast milk.
Bexarotene Counseling:  I discussed with the patient the risks of bexarotene including but not limited to hair loss, dry lips/skin/eyes, liver abnormalities, hyperlipidemia, pancreatitis, depression/suicidal ideation, photosensitivity, drug rash/allergic reactions, hypothyroidism, anemia, leukopenia, infection, cataracts, and teratogenicity.  Patient understands that they will need regular blood tests to check lipid profile, liver function tests, white blood cell count, thyroid function tests and pregnancy test if applicable.
Fluconazole Counseling:  Patient counseled regarding adverse effects of fluconazole including but not limited to headache, diarrhea, nausea, upset stomach, liver function test abnormalities, taste disturbance, and stomach pain.  There is a rare possibility of liver failure that can occur when taking fluconazole.  The patient understands that monitoring of LFTs and kidney function test may be required, especially at baseline. The patient verbalized understanding of the proper use and possible adverse effects of fluconazole.  All of the patient's questions and concerns were addressed.
Odomzo Counseling- I discussed with the patient the risks of Odomzo including but not limited to nausea, vomiting, diarrhea, constipation, weight loss, changes in the sense of taste, decreased appetite, muscle spasms, and hair loss.  The patient verbalized understanding of the proper use and possible adverse effects of Odomzo.  All of the patient's questions and concerns were addressed.
Oxybutynin Pregnancy And Lactation Text: This medication is Pregnancy Category B and is considered safe during pregnancy. It is unknown if it is excreted in breast milk.
Nsaids Counseling: NSAID Counseling: I discussed with the patient that NSAIDs should be taken with food. Prolonged use of NSAIDs can result in the development of stomach ulcers.  Patient advised to stop taking NSAIDs if abdominal pain occurs.  The patient verbalized understanding of the proper use and possible adverse effects of NSAIDs.  All of the patient's questions and concerns were addressed.
Siliq Counseling:  I discussed with the patient the risks of Siliq including but not limited to new or worsening depression, suicidal thoughts and behavior, immunosuppression, malignancy, posterior leukoencephalopathy syndrome, and serious infections.  The patient understands that monitoring is required including a PPD at baseline and must alert us or the primary physician if symptoms of infection or other concerning signs are noted. There is also a special program designed to monitor depression which is required with Siliq.
Quinolones Counseling:  I discussed with the patient the risks of fluoroquinolones including but not limited to GI upset, allergic reaction, drug rash, diarrhea, dizziness, photosensitivity, yeast infections, liver function test abnormalities, tendonitis/tendon rupture.
Spironolactone Pregnancy And Lactation Text: This medication can cause feminization of the male fetus and should be avoided during pregnancy. The active metabolite is also found in breast milk.
Arava Counseling:  Patient counseled regarding adverse effects of Arava including but not limited to nausea, vomiting, abnormalities in liver function tests. Patients may develop mouth sores, rash, diarrhea, and abnormalities in blood counts. The patient understands that monitoring is required including LFTs and blood counts.  There is a rare possibility of scarring of the liver and lung problems that can occur when taking methotrexate. Persistent nausea, loss of appetite, pale stools, dark urine, cough, and shortness of breath should be reported immediately. Patient advised to discontinue Arava treatment and consult with a physician prior to attempting conception. The patient will have to undergo a treatment to eliminate Arava from the body prior to conception.
Winlevi Counseling:  I discussed with the patient the risks of topical clascoterone including but not limited to erythema, scaling, itching, and stinging. Patient voiced their understanding.
Rhofade Counseling: Rhofade is a topical medication which can decrease superficial blood flow where applied. Side effects are uncommon and include stinging, redness and allergic reactions.
Aklief Pregnancy And Lactation Text: It is unknown if this medication is safe to use during pregnancy.  It is unknown if this medication is excreted in breast milk.  Breastfeeding women should use the topical cream on the smallest area of the skin for the shortest time needed while breastfeeding.  Do not apply to nipple and areola.
Methotrexate Counseling:  Patient counseled regarding adverse effects of methotrexate including but not limited to nausea, vomiting, abnormalities in liver function tests. Patients may develop mouth sores, rash, diarrhea, and abnormalities in blood counts. The patient understands that monitoring is required including LFT's and blood counts.  There is a rare possibility of scarring of the liver and lung problems that can occur when taking methotrexate. Persistent nausea, loss of appetite, pale stools, dark urine, cough, and shortness of breath should be reported immediately. Patient advised to discontinue methotrexate treatment at least three months before attempting to become pregnant.  I discussed the need for folate supplements while taking methotrexate.  These supplements can decrease side effects during methotrexate treatment. The patient verbalized understanding of the proper use and possible adverse effects of methotrexate.  All of the patient's questions and concerns were addressed.
Gabapentin Pregnancy And Lactation Text: This medication is Pregnancy Category C and isn't considered safe during pregnancy. It is excreted in breast milk.
Ivermectin Counseling:  Patient instructed to take medication on an empty stomach with a full glass of water.  Patient informed of potential adverse effects including but not limited to nausea, diarrhea, dizziness, itching, and swelling of the extremities or lymph nodes.  The patient verbalized understanding of the proper use and possible adverse effects of ivermectin.  All of the patient's questions and concerns were addressed.
Cantharidin Counseling: Calcipotriene Counseling:  I discussed with the patient the risks of calcipotriene including but not limited to erythema, scaling, itching, and irritation.
Valtrex Pregnancy And Lactation Text: this medication is Pregnancy Category B and is considered safe during pregnancy. This medication is not directly found in breast milk but it's metabolite acyclovir is present.
Clindamycin Pregnancy And Lactation Text: This medication can be used in pregnancy if certain situations. Clindamycin is also present in breast milk.
Tremfya Counseling: I discussed with the patient the risks of guselkumab including but not limited to immunosuppression, serious infections, and drug reactions.  The patient understands that monitoring is required including a PPD at baseline and must alert us or the primary physician if symptoms of infection or other concerning signs are noted.
Rinvoq Counseling: I discussed with the patient the risks of Rinvoq therapy including but not limited to upper respiratory tract infections, shingles, cold sores, bronchitis, nausea, cough, fever, acne, and headache. Live vaccines should be avoided.  This medication has been linked to serious infections; higher rate of mortality; malignancy and lymphoproliferative disorders; major adverse cardiovascular events; thrombosis; thrombocytopenia, anemia, and neutropenia; lipid elevations; liver enzyme elevations; and gastrointestinal perforations.
Fluconazole Pregnancy And Lactation Text: This medication is Pregnancy Category C and it isn't know if it is safe during pregnancy. It is also excreted in breast milk.
Mirvaso Pregnancy And Lactation Text: This medication has not been assigned a Pregnancy Risk Category. It is unknown if the medication is excreted in breast milk.
Dupixent Pregnancy And Lactation Text: This medication likely crosses the placenta but the risk for the fetus is uncertain. This medication is excreted in breast milk.
decreased strength/pain/decreased ROM
Winlevi Pregnancy And Lactation Text: This medication is considered safe during pregnancy and breastfeeding.
Cimetidine Counseling:  I discussed with the patient the risks of Cimetidine including but not limited to gynecomastia, headache, diarrhea, nausea, drowsiness, arrhythmias, pancreatitis, skin rashes, psychosis, bone marrow suppression and kidney toxicity.
Hydroquinone Counseling:  Patient advised that medication may result in skin irritation, lightening (hypopigmentation), dryness, and burning.  In the event of skin irritation, the patient was advised to reduce the amount of the drug applied or use it less frequently.  Rarely, spots that are treated with hydroquinone can become darker (pseudoochronosis).  Should this occur, patient instructed to stop medication and call the office. The patient verbalized understanding of the proper use and possible adverse effects of hydroquinone.  All of the patient's questions and concerns were addressed.
Odomzo Pregnancy And Lactation Text: This medication is Pregnancy Category X and is absolutely contraindicated during pregnancy. It is unknown if it is excreted in breast milk.
Propranolol Counseling:  I discussed with the patient the risks of propranolol including but not limited to low heart rate, low blood pressure, low blood sugar, restlessness and increased cold sensitivity. They should call the office if they experience any of these side effects.
Bexarotene Pregnancy And Lactation Text: This medication is Pregnancy Category X and should not be given to women who are pregnant or may become pregnant. This medication should not be used if you are breast feeding.
Nsaids Pregnancy And Lactation Text: These medications are considered safe up to 30 weeks gestation. It is excreted in breast milk.
Methotrexate Pregnancy And Lactation Text: This medication is Pregnancy Category X and is known to cause fetal harm. This medication is excreted in breast milk.
Doxycycline Counseling:  Patient counseled regarding possible photosensitivity and increased risk for sunburn.  Patient instructed to avoid sunlight, if possible.  When exposed to sunlight, patients should wear protective clothing, sunglasses, and sunscreen.  The patient was instructed to call the office immediately if the following severe adverse effects occur:  hearing changes, easy bruising/bleeding, severe headache, or vision changes.  The patient verbalized understanding of the proper use and possible adverse effects of doxycycline.  All of the patient's questions and concerns were addressed.
Glycopyrrolate Counseling:  I discussed with the patient the risks of glycopyrrolate including but not limited to skin rash, drowsiness, dry mouth, difficulty urinating, and blurred vision.
Azelaic Acid Counseling: Patient counseled that medicine may cause skin irritation and to avoid applying near the eyes.  In the event of skin irritation, the patient was advised to reduce the amount of the drug applied or use it less frequently.   The patient verbalized understanding of the proper use and possible adverse effects of azelaic acid.  All of the patient's questions and concerns were addressed.
Cantharidin Pregnancy And Lactation Text: The use of this medication during pregnancy or lactation is not recommended as there is insufficient data.
Topical Ketoconazole Counseling: Patient counseled that this medication may cause skin irritation or allergic reactions.  In the event of skin irritation, the patient was advised to reduce the amount of the drug applied or use it less frequently.   The patient verbalized understanding of the proper use and possible adverse effects of ketoconazole.  All of the patient's questions and concerns were addressed.
Enbrel Counseling:  I discussed with the patient the risks of etanercept including but not limited to myelosuppression, immunosuppression, autoimmune hepatitis, demyelinating diseases, lymphoma, and infections.  The patient understands that monitoring is required including a PPD at baseline and must alert us or the primary physician if symptoms of infection or other concerning signs are noted.
Opzelura Counseling:  I discussed with the patient the risks of Opzelura including but not limited to nasopharngitis, bronchitis, ear infection, eosinophila, hives, diarrhea, folliculitis, tonsillitis, and rhinorrhea.  Taken orally, this medication has been linked to serious infections; higher rate of mortality; malignancy and lymphoproliferative disorders; major adverse cardiovascular events; thrombosis; thrombocytopenia, anemia, and neutropenia; and lipid elevations.
Griseofulvin Counseling:  I discussed with the patient the risks of griseofulvin including but not limited to photosensitivity, cytopenia, liver damage, nausea/vomiting and severe allergy.  The patient understands that this medication is best absorbed when taken with a fatty meal (e.g., ice cream or french fries).
Azathioprine Counseling:  I discussed with the patient the risks of azathioprine including but not limited to myelosuppression, immunosuppression, hepatotoxicity, lymphoma, and infections.  The patient understands that monitoring is required including baseline LFTs, Creatinine, possible TPMP genotyping and weekly CBCs for the first month and then every 2 weeks thereafter.  The patient verbalized understanding of the proper use and possible adverse effects of azathioprine.  All of the patient's questions and concerns were addressed.
Propranolol Pregnancy And Lactation Text: This medication is Pregnancy Category C and it isn't known if it is safe during pregnancy. It is excreted in breast milk.
Simponi Counseling:  I discussed with the patient the risks of golimumab including but not limited to myelosuppression, immunosuppression, autoimmune hepatitis, demyelinating diseases, lymphoma, and serious infections.  The patient understands that monitoring is required including a PPD at baseline and must alert us or the primary physician if symptoms of infection or other concerning signs are noted.
Rinvoq Pregnancy And Lactation Text: Based on animal studies, Rinvoq may cause embryo-fetal harm when administered to pregnant women.  The medication should not be used in pregnancy.  Breastfeeding is not recommended during treatment and for 6 days after the last dose.
Isotretinoin Counseling: Patient should get monthly blood tests, not donate blood, not drive at night if vision affected, not share medication, and not undergo elective surgery for 6 months after tx completed. Side effects reviewed, pt to contact office should one occur.
5-Fu Counseling: 5-Fluorouracil Counseling:  I discussed with the patient the risks of 5-fluorouracil including but not limited to erythema, scaling, itching, weeping, crusting, and pain.
Olanzapine Counseling- I discussed with the patient the common side effects of olanzapine including but are not limited to: lack of energy, dry mouth, increased appetite, sleepiness, tremor, constipation, dizziness, changes in behavior, or restlessness.  Explained that teenagers are more likely to experience headaches, abdominal pain, pain in the arms or legs, tiredness, and sleepiness.  Serious side effects include but are not limited: increased risk of death in elderly patients who are confused, have memory loss, or dementia-related psychosis; hyperglycemia; increased cholesterol and triglycerides; and weight gain.
Doxycycline Pregnancy And Lactation Text: This medication is Pregnancy Category D and not consider safe during pregnancy. It is also excreted in breast milk but is considered safe for shorter treatment courses.
Prednisone Counseling:  I discussed with the patient the risks of prolonged use of prednisone including but not limited to weight gain, insomnia, osteoporosis, mood changes, diabetes, susceptibility to infection, glaucoma and high blood pressure.  In cases where prednisone use is prolonged, patients should be monitored with blood pressure checks, serum glucose levels and an eye exam.  Additionally, the patient may need to be placed on GI prophylaxis, PCP prophylaxis, and calcium and vitamin D supplementation and/or a bisphosphonate.  The patient verbalized understanding of the proper use and the possible adverse effects of prednisone.  All of the patient's questions and concerns were addressed.
VTAMA Counseling: I discussed with the patient that VTAMA is not for use in the eyes, mouth or mouth. They should call the office if they develop any signs of allergic reactions to VTAMA. The patient verbalized understanding of the proper use and possible adverse effects of VTAMA.  All of the patient's questions and concerns were addressed.
Glycopyrrolate Pregnancy And Lactation Text: This medication is Pregnancy Category B and is considered safe during pregnancy. It is unknown if it is excreted breast milk.
Azithromycin Counseling:  I discussed with the patient the risks of azithromycin including but not limited to GI upset, allergic reaction, drug rash, diarrhea, and yeast infections.
Clofazimine Counseling:  I discussed with the patient the risks of clofazimine including but not limited to skin and eye pigmentation, liver damage, nausea/vomiting, gastrointestinal bleeding and allergy.
Dutasteride Male Counseling: Dustasteride Counseling:  I discussed with the patient the risks of use of dutasteride including but not limited to decreased libido, decreased ejaculate volume, and gynecomastia. Women who can become pregnant should not handle medication.  All of the patient's questions and concerns were addressed.
Opzelura Pregnancy And Lactation Text: There is insufficient data to evaluate drug-associated risk for major birth defects, miscarriage, or other adverse maternal or fetal outcomes.  There is a pregnancy registry that monitors pregnancy outcomes in pregnant persons exposed to the medication during pregnancy.  It is unknown if this medication is excreted in breast milk.  Do not breastfeed during treatment and for about 4 weeks after the last dose.
Enbrel Pregnancy And Lactation Text: This medication is Pregnancy Category B and is considered safe during pregnancy. It is unknown if this medication is excreted in breast milk.
Rifampin Counseling: I discussed with the patient the risks of rifampin including but not limited to liver damage, kidney damage, red-orange body fluids, nausea/vomiting and severe allergy.
Solaraze Counseling:  I discussed with the patient the risks of Solaraze including but not limited to erythema, scaling, itching, weeping, crusting, and pain.
Adbry Counseling: I discussed with the patient the risks of tralokinumab including but not limited to eye infection and irritation, cold sores, injection site reactions, worsening of asthma, allergic reactions and increased risk of parasitic infection.  Live vaccines should be avoided while taking tralokinumab. The patient understands that monitoring is required and they must alert us or the primary physician if symptoms of infection or other concerning signs are noted.
Griseofulvin Pregnancy And Lactation Text: This medication is Pregnancy Category X and is known to cause serious birth defects. It is unknown if this medication is excreted in breast milk but breast feeding should be avoided.
Azathioprine Pregnancy And Lactation Text: This medication is Pregnancy Category D and isn't considered safe during pregnancy. It is unknown if this medication is excreted in breast milk.
Azelaic Acid Pregnancy And Lactation Text: This medication is considered safe during pregnancy and breast feeding.
SSKI Counseling:  I discussed with the patient the risks of SSKI including but not limited to thyroid abnormalities, metallic taste, GI upset, fever, headache, acne, arthralgias, paraesthesias, lymphadenopathy, easy bleeding, arrhythmias, and allergic reaction.
Sotyktu Counseling:  I discussed the most common side effects of Sotyktu including: common cold, sore throat, sinus infections, cold sores, canker sores, folliculitis, and acne.? I also discussed more serious side effects of Sotyktu including but not limited to: serious allergic reactions; increased risk for infections such as TB; cancers such as lymphomas; rhabdomyolysis and elevated CPK; and elevated triglycerides and liver enzymes.?
Xolair Counseling:  Patient informed of potential adverse effects including but not limited to fever, muscle aches, rash and allergic reactions.  The patient verbalized understanding of the proper use and possible adverse effects of Xolair.  All of the patient's questions and concerns were addressed.
Doxepin Counseling:  Patient advised that the medication is sedating and not to drive a car after taking this medication. Patient informed of potential adverse effects including but not limited to dry mouth, urinary retention, and blurry vision.  The patient verbalized understanding of the proper use and possible adverse effects of doxepin.  All of the patient's questions and concerns were addressed.
Topical Steroids Counseling: I discussed with the patient that prolonged use of topical steroids can result in the increased appearance of superficial blood vessels (telangiectasias), lightening (hypopigmentation) and thinning of the skin (atrophy).  Patient understands to avoid using high potency steroids in skin folds, the groin or the face.  The patient verbalized understanding of the proper use and possible adverse effects of topical steroids.  All of the patient's questions and concerns were addressed.
Vtama Pregnancy And Lactation Text: It is unknown if this medication can cause problems during pregnancy and breastfeeding.
Imiquimod Counseling:  I discussed with the patient the risks of imiquimod including but not limited to erythema, scaling, itching, weeping, crusting, and pain.  Patient understands that the inflammatory response to imiquimod is variable from person to person and was educated regarded proper titration schedule.  If flu-like symptoms develop, patient knows to discontinue the medication and contact us.
Isotretinoin Pregnancy And Lactation Text: This medication is Pregnancy Category X and is considered extremely dangerous during pregnancy. It is unknown if it is excreted in breast milk.
Dutasteride Pregnancy And Lactation Text: This medication is absolutely contraindicated in women, especially during pregnancy and breast feeding. Feminization of male fetuses is possible if taking while pregnant.
Solaraze Pregnancy And Lactation Text: This medication is Pregnancy Category B and is considered safe. There is some data to suggest avoiding during the third trimester. It is unknown if this medication is excreted in breast milk.
Azithromycin Pregnancy And Lactation Text: This medication is considered safe during pregnancy and is also secreted in breast milk.
Olanzapine Pregnancy And Lactation Text: This medication is pregnancy category C.   There are no adequate and well controlled trials with olanzapine in pregnant females.  Olanzapine should be used during pregnancy only if the potential benefit justifies the potential risk to the fetus.   In a study in lactating healthy women, olanzapine was excreted in breast milk.  It is recommended that women taking olanzapine should not breast feed.
Hydroxychloroquine Counseling:  I discussed with the patient that a baseline ophthalmologic exam is needed at the start of therapy and every year thereafter while on therapy. A CBC may also be warranted for monitoring.  The side effects of this medication were discussed with the patient, including but not limited to agranulocytosis, aplastic anemia, seizures, rashes, retinopathy, and liver toxicity. Patient instructed to call the office should any adverse effect occur.  The patient verbalized understanding of the proper use and possible adverse effects of Plaquenil.  All the patient's questions and concerns were addressed.
Rifampin Pregnancy And Lactation Text: This medication is Pregnancy Category C and it isn't know if it is safe during pregnancy. It is also excreted in breast milk and should not be used if you are breast feeding.
Sotyktu Pregnancy And Lactation Text: There is insufficient data to evaluate whether or not Sotyktu is safe to use during pregnancy.? ?It is not known if Sotyktu passes into breast milk and whether or not it is safe to use when breastfeeding.??
Humira Counseling:  I discussed with the patient the risks of adalimumab including but not limited to myelosuppression, immunosuppression, autoimmune hepatitis, demyelinating diseases, lymphoma, and serious infections.  The patient understands that monitoring is required including a PPD at baseline and must alert us or the primary physician if symptoms of infection or other concerning signs are noted.
Xolair Pregnancy And Lactation Text: This medication is Pregnancy Category B and is considered safe during pregnancy. This medication is excreted in breast milk.
Erythromycin Counseling:  I discussed with the patient the risks of erythromycin including but not limited to GI upset, allergic reaction, drug rash, diarrhea, increase in liver enzymes, and yeast infections.
Benzoyl Peroxide Counseling: Patient counseled that medicine may cause skin irritation and bleach clothing.  In the event of skin irritation, the patient was advised to reduce the amount of the drug applied or use it less frequently.   The patient verbalized understanding of the proper use and possible adverse effects of benzoyl peroxide.  All of the patient's questions and concerns were addressed.
Cellcept Counseling:  I discussed with the patient the risks of mycophenolate mofetil including but not limited to infection/immunosuppression, GI upset, hypokalemia, hypercholesterolemia, bone marrow suppression, lymphoproliferative disorders, malignancy, GI ulceration/bleed/perforation, colitis, interstitial lung disease, kidney failure, progressive multifocal leukoencephalopathy, and birth defects.  The patient understands that monitoring is required including a baseline creatinine and regular CBC testing. In addition, patient must alert us immediately if symptoms of infection or other concerning signs are noted.
Imiquimod Pregnancy And Lactation Text: This medication is Pregnancy Category C. It is unknown if this medication is excreted in breast milk.
Adbry Pregnancy And Lactation Text: It is unknown if this medication will adversely affect pregnancy or breast feeding.
High Dose Vitamin A Counseling: Side effects reviewed, pt to contact office should one occur.
Sski Pregnancy And Lactation Text: This medication is Pregnancy Category D and isn't considered safe during pregnancy. It is excreted in breast milk.
Itraconazole Counseling:  I discussed with the patient the risks of itraconazole including but not limited to liver damage, nausea/vomiting, neuropathy, and severe allergy.  The patient understands that this medication is best absorbed when taken with acidic beverages such as non-diet cola or ginger ale.  The patient understands that monitoring is required including baseline LFTs and repeat LFTs at intervals.  The patient understands that they are to contact us or the primary physician if concerning signs are noted.
Picato Counseling:  I discussed with the patient the risks of Picato including but not limited to erythema, scaling, itching, weeping, crusting, and pain.
Skyrizi Counseling: I discussed with the patient the risks of risankizumab-rzaa including but not limited to immunosuppression, and serious infections.  The patient understands that monitoring is required including a PPD at baseline and must alert us or the primary physician if symptoms of infection or other concerning signs are noted.
Oral Minoxidil Counseling- I discussed with the patient the risks of oral minoxidil including but not limited to shortness of breath, swelling of the feet or ankles, dizziness, lightheadedness, unwanted hair growth and allergic reaction.  The patient verbalized understanding of the proper use and possible adverse effects of oral minoxidil.  All of the patient's questions and concerns were addressed.
Topical Retinoid counseling:  Patient advised to apply a pea-sized amount only at bedtime and wait 30 minutes after washing their face before applying.  If too drying, patient may add a non-comedogenic moisturizer. The patient verbalized understanding of the proper use and possible adverse effects of retinoids.  All of the patient's questions and concerns were addressed.
Doxepin Pregnancy And Lactation Text: This medication is Pregnancy Category C and it isn't known if it is safe during pregnancy. It is also excreted in breast milk and breast feeding isn't recommended.
Zoryve Counseling:  I discussed with the patient that Zoryve is not for use in the eyes, mouth or vagina. The most commonly reported side effects include diarrhea, headache, insomnia, application site pain, upper respiratory tract infections, and urinary tract infections.  All of the patient's questions and concerns were addressed.
Drysol Counseling:  I discussed with the patient the risks of drysol/aluminum chloride including but not limited to skin rash, itching, irritation, burning.
Topical Steroids Applications Pregnancy And Lactation Text: Most topical steroids are considered safe to use during pregnancy and lactation.  Any topical steroid applied to the breast or nipple should be washed off before breastfeeding.
Benzoyl Peroxide Pregnancy And Lactation Text: This medication is Pregnancy Category C. It is unknown if benzoyl peroxide is excreted in breast milk.
Hydroxychloroquine Pregnancy And Lactation Text: This medication has been shown to cause fetal harm but it isn't assigned a Pregnancy Risk Category. There are small amounts excreted in breast milk.
Bactrim Counseling:  I discussed with the patient the risks of sulfa antibiotics including but not limited to GI upset, allergic reaction, drug rash, diarrhea, dizziness, photosensitivity, and yeast infections.  Rarely, more serious reactions can occur including but not limited to aplastic anemia, agranulocytosis, methemoglobinemia, blood dyscrasias, liver or kidney failure, lung infiltrates or desquamative/blistering drug rashes.
Erythromycin Pregnancy And Lactation Text: This medication is Pregnancy Category B and is considered safe during pregnancy. It is also excreted in breast milk.
Colchicine Counseling:  Patient counseled regarding adverse effects including but not limited to stomach upset (nausea, vomiting, stomach pain, or diarrhea).  Patient instructed to limit alcohol consumption while taking this medication.  Colchicine may reduce blood counts especially with prolonged use.  The patient understands that monitoring of kidney function and blood counts may be required, especially at baseline. The patient verbalized understanding of the proper use and possible adverse effects of colchicine.  All of the patient's questions and concerns were addressed.
Sarecycline Counseling: Patient advised regarding possible photosensitivity and discoloration of the teeth, skin, lips, tongue and gums.  Patient instructed to avoid sunlight, if possible.  When exposed to sunlight, patients should wear protective clothing, sunglasses, and sunscreen.  The patient was instructed to call the office immediately if the following severe adverse effects occur:  hearing changes, easy bruising/bleeding, severe headache, or vision changes.  The patient verbalized understanding of the proper use and possible adverse effects of sarecycline.  All of the patient's questions and concerns were addressed.
Finasteride Male Counseling: Finasteride Counseling:  I discussed with the patient the risks of use of finasteride including but not limited to decreased libido, decreased ejaculate volume, gynecomastia, and depression. Women should not handle medication.  All of the patient's questions and concerns were addressed.
Thalidomide Counseling: I discussed with the patient the risks of thalidomide including but not limited to birth defects, anxiety, weakness, chest pain, dizziness, cough and severe allergy.
Klisyri Counseling:  I discussed with the patient the risks of Klisyri including but not limited to erythema, scaling, itching, weeping, crusting, and pain.
Cimzia Counseling:  I discussed with the patient the risks of Cimzia including but not limited to immunosuppression, allergic reactions and infections.  The patient understands that monitoring is required including a PPD at baseline and must alert us or the primary physician if symptoms of infection or other concerning signs are noted.
Xeljanz Counseling: I discussed with the patient the risks of Xeljanz therapy including increased risk of infection, liver issues, headache, diarrhea, or cold symptoms. Live vaccines should be avoided. They were instructed to call if they have any problems.
Hydroxyzine Counseling: Patient advised that the medication is sedating and not to drive a car after taking this medication.  Patient informed of potential adverse effects including but not limited to dry mouth, urinary retention, and blurry vision.  The patient verbalized understanding of the proper use and possible adverse effects of hydroxyzine.  All of the patient's questions and concerns were addressed.
Topical Sulfur Applications Counseling: Topical Sulfur Counseling: Patient counseled that this medication may cause skin irritation or allergic reactions.  In the event of skin irritation, the patient was advised to reduce the amount of the drug applied or use it less frequently.   The patient verbalized understanding of the proper use and possible adverse effects of topical sulfur application.  All of the patient's questions and concerns were addressed.
High Dose Vitamin A Pregnancy And Lactation Text: High dose vitamin A therapy is contraindicated during pregnancy and breast feeding.
Erivedge Counseling- I discussed with the patient the risks of Erivedge including but not limited to nausea, vomiting, diarrhea, constipation, weight loss, changes in the sense of taste, decreased appetite, muscle spasms, and hair loss.  The patient verbalized understanding of the proper use and possible adverse effects of Erivedge.  All of the patient's questions and concerns were addressed.
Oral Minoxidil Pregnancy And Lactation Text: This medication should only be used when clearly needed if you are pregnant, attempting to become pregnant or breast feeding.
Low Dose Naltrexone Counseling- I discussed with the patient the potential risks and side effects of low dose naltrexone including but not limited to: more vivid dreams, headaches, nausea, vomiting, abdominal pain, fatigue, dizziness, and anxiety.
Metronidazole Counseling:  I discussed with the patient the risks of metronidazole including but not limited to seizures, nausea/vomiting, a metallic taste in the mouth, nausea/vomiting and severe allergy.
Carac Counseling:  I discussed with the patient the risks of Carac including but not limited to erythema, scaling, itching, weeping, crusting, and pain.
Finasteride Pregnancy And Lactation Text: This medication is absolutely contraindicated during pregnancy. It is unknown if it is excreted in breast milk.
Detail Level: Simple
Protopic Counseling: Patient may experience a mild burning sensation during topical application. Protopic is not approved in children less than 2 years of age. There have been case reports of hematologic and skin malignancies in patients using topical calcineurin inhibitors although causality is questionable.
Ilumya Counseling: I discussed with the patient the risks of tildrakizumab including but not limited to immunosuppression, malignancy, posterior leukoencephalopathy syndrome, and serious infections.  The patient understands that monitoring is required including a PPD at baseline and must alert us or the primary physician if symptoms of infection or other concerning signs are noted.
Cyclophosphamide Counseling:  I discussed with the patient the risks of cyclophosphamide including but not limited to hair loss, hormonal abnormalities, decreased fertility, abdominal pain, diarrhea, nausea and vomiting, bone marrow suppression and infection. The patient understands that monitoring is required while taking this medication.
Bactrim Pregnancy And Lactation Text: This medication is Pregnancy Category D and is known to cause fetal risk.  It is also excreted in breast milk.
Xelnoryz Pregnancy And Lactation Text: This medication is Pregnancy Category D and is not considered safe during pregnancy.  The risk during breast feeding is also uncertain.
Ketoconazole Counseling:   Patient counseled regarding improving absorption with orange juice.  Adverse effects include but are not limited to breast enlargement, headache, diarrhea, nausea, upset stomach, liver function test abnormalities, taste disturbance, and stomach pain.  There is a rare possibility of liver failure that can occur when taking ketoconazole. The patient understands that monitoring of LFTs may be required, especially at baseline. The patient verbalized understanding of the proper use and possible adverse effects of ketoconazole.  All of the patient's questions and concerns were addressed.
Hydroxyzine Pregnancy And Lactation Text: This medication is not safe during pregnancy and should not be taken. It is also excreted in breast milk and breast feeding isn't recommended.
Cimzia Pregnancy And Lactation Text: This medication crosses the placenta but can be considered safe in certain situations. Cimzia may be excreted in breast milk.
Stelara Counseling:  I discussed with the patient the risks of ustekinumab including but not limited to immunosuppression, malignancy, posterior leukoencephalopathy syndrome, and serious infections.  The patient understands that monitoring is required including a PPD at baseline and must alert us or the primary physician if symptoms of infection or other concerning signs are noted.
Cibinqo Counseling: I discussed with the patient the risks of Cibinqo therapy including but not limited to common cold, nausea, headache, cold sores, increased blood CPK levels, dizziness, UTIs, fatigue, acne, and vomitting. Live vaccines should be avoided.  This medication has been linked to serious infections; higher rate of mortality; malignancy and lymphoproliferative disorders; major adverse cardiovascular events; thrombosis; thrombocytopenia and lymphopenia; lipid elevations; and retinal detachment.
Klisyri Pregnancy And Lactation Text: It is unknown if this medication can harm a developing fetus or if it is excreted in breast milk.
Topical Sulfur Applications Pregnancy And Lactation Text: This medication is Pregnancy Category C and has an unknown safety profile during pregnancy. It is unknown if this topical medication is excreted in breast milk.
Elidel Counseling: Patient may experience a mild burning sensation during topical application. Elidel is not approved in children less than 2 years of age. There have been case reports of hematologic and skin malignancies in patients using topical calcineurin inhibitors although causality is questionable.
Metronidazole Pregnancy And Lactation Text: This medication is Pregnancy Category B and considered safe during pregnancy.  It is also excreted in breast milk.
Otezla Counseling: The side effects of Otezla were discussed with the patient, including but not limited to worsening or new depression, weight loss, diarrhea, nausea, upper respiratory tract infection, and headache. Patient instructed to call the office should any adverse effect occur.  The patient verbalized understanding of the proper use and possible adverse effects of Otezla.  All the patient's questions and concerns were addressed.
Zyclara Counseling:  I discussed with the patient the risks of imiquimod including but not limited to erythema, scaling, itching, weeping, crusting, and pain.  Patient understands that the inflammatory response to imiquimod is variable from person to person and was educated regarded proper titration schedule.  If flu-like symptoms develop, patient knows to discontinue the medication and contact us.
Cephalexin Counseling: I counseled the patient regarding use of cephalexin as an antibiotic for prophylactic and/or therapeutic purposes. Cephalexin (commonly prescribed under brand name Keflex) is a cephalosporin antibiotic which is active against numerous classes of bacteria, including most skin bacteria. Side effects may include nausea, diarrhea, gastrointestinal upset, rash, hives, yeast infections, and in rare cases, hepatitis, kidney disease, seizures, fever, confusion, neurologic symptoms, and others. Patients with severe allergies to penicillin medications are cautioned that there is about a 10% incidence of cross-reactivity with cephalosporins. When possible, patients with penicillin allergies should use alternatives to cephalosporins for antibiotic therapy.
Low Dose Naltrexone Pregnancy And Lactation Text: Naltrexone is pregnancy category C.  There have been no adequate and well-controlled studies in pregnant women.  It should be used in pregnancy only if the potential benefit justifies the potential risk to the fetus.   Limited data indicates that naltrexone is minimally excreted into breastmilk.
Dapsone Counseling: I discussed with the patient the risks of dapsone including but not limited to hemolytic anemia, agranulocytosis, rashes, methemoglobinemia, kidney failure, peripheral neuropathy, headaches, GI upset, and liver toxicity.  Patients who start dapsone require monitoring including baseline LFTs and weekly CBCs for the first month, then every month thereafter.  The patient verbalized understanding of the proper use and possible adverse effects of dapsone.  All of the patient's questions and concerns were addressed.
Birth Control Pills Counseling: Birth Control Pill Counseling: I discussed with the patient the potential side effects of OCPs including but not limited to increased risk of stroke, heart attack, thrombophlebitis, deep venous thrombosis, hepatic adenomas, breast changes, GI upset, headaches, and depression.  The patient verbalized understanding of the proper use and possible adverse effects of OCPs. All of the patient's questions and concerns were addressed.
Protopic Pregnancy And Lactation Text: This medication is Pregnancy Category C. It is unknown if this medication is excreted in breast milk when applied topically.
Opioid Counseling: I discussed with the patient the potential side effects of opioids including but not limited to addiction, altered mental status, and depression. I stressed avoiding alcohol, benzodiazepines, muscle relaxants and sleep aids unless specifically okayed by a physician. The patient verbalized understanding of the proper use and possible adverse effects of opioids. All of the patient's questions and concerns were addressed. They were instructed to flush the remaining pills down the toilet if they did not need them for pain.
Tetracycline Counseling: Patient counseled regarding possible photosensitivity and increased risk for sunburn.  Patient instructed to avoid sunlight, if possible.  When exposed to sunlight, patients should wear protective clothing, sunglasses, and sunscreen.  The patient was instructed to call the office immediately if the following severe adverse effects occur:  hearing changes, easy bruising/bleeding, severe headache, or vision changes.  The patient verbalized understanding of the proper use and possible adverse effects of tetracycline.  All of the patient's questions and concerns were addressed. Patient understands to avoid pregnancy while on therapy due to potential birth defects.
Tazorac Counseling:  Patient advised that medication is irritating and drying.  Patient may need to apply sparingly and wash off after an hour before eventually leaving it on overnight.  The patient verbalized understanding of the proper use and possible adverse effects of tazorac.  All of the patient's questions and concerns were addressed.
Minoxidil Counseling: Minoxidil is a topical medication which can increase blood flow where it is applied. It is uncertain how this medication increases hair growth. Side effects are uncommon and include stinging and allergic reactions.
Cosentyx Counseling:  I discussed with the patient the risks of Cosentyx including but not limited to worsening of Crohn's disease, immunosuppression, allergic reactions and infections.  The patient understands that monitoring is required including a PPD at baseline and must alert us or the primary physician if symptoms of infection or other concerning signs are noted.
Ketoconazole Pregnancy And Lactation Text: This medication is Pregnancy Category C and it isn't know if it is safe during pregnancy. It is also excreted in breast milk and breast feeding isn't recommended.
Cyclophosphamide Pregnancy And Lactation Text: This medication is Pregnancy Category D and it isn't considered safe during pregnancy. This medication is excreted in breast milk.
Tranexamic Acid Counseling:  Patient advised of the small risk of bleeding problems with tranexamic acid. They were also instructed to call if they developed any nausea, vomiting or diarrhea. All of the patient's questions and concerns were addressed.
Libtayo Counseling- I discussed with the patient the risks of Libtayo including but not limited to nausea, vomiting, diarrhea, and bone or muscle pain.  The patient verbalized understanding of the proper use and possible adverse effects of Libtayo.  All of the patient's questions and concerns were addressed.
Otezla Pregnancy And Lactation Text: This medication is Pregnancy Category C and it isn't known if it is safe during pregnancy. It is unknown if it is excreted in breast milk.
Rituxan Counseling:  I discussed with the patient the risks of Rituxan infusions. Side effects can include infusion reactions, severe drug rashes including mucocutaneous reactions, reactivation of latent hepatitis and other infections and rarely progressive multifocal leukoencephalopathy.  All of the patient's questions and concerns were addressed.
Cibinqo Pregnancy And Lactation Text: It is unknown if this medication will adversely affect pregnancy or breast feeding.  You should not take this medication if you are currently pregnant or planning a pregnancy or while breastfeeding.
Tazorac Pregnancy And Lactation Text: This medication is not safe during pregnancy. It is unknown if this medication is excreted in breast milk.
Opioid Pregnancy And Lactation Text: These medications can lead to premature delivery and should be avoided during pregnancy. These medications are also present in breast milk in small amounts.
Albendazole Counseling:  I discussed with the patient the risks of albendazole including but not limited to cytopenia, kidney damage, nausea/vomiting and severe allergy.  The patient understands that this medication is being used in an off-label manner.
Cephalexin Pregnancy And Lactation Text: This medication is Pregnancy Category B and considered safe during pregnancy.  It is also excreted in breast milk but can be used safely for shorter doses.
Acitretin Counseling:  I discussed with the patient the risks of acitretin including but not limited to hair loss, dry lips/skin/eyes, liver damage, hyperlipidemia, depression/suicidal ideation, photosensitivity.  Serious rare side effects can include but are not limited to pancreatitis, pseudotumor cerebri, bony changes, clot formation/stroke/heart attack.  Patient understands that alcohol is contraindicated since it can result in liver toxicity and significantly prolong the elimination of the drug by many years.
Niacinamide Counseling: I recommended taking niacin or niacinamide, also know as vitamin B3, twice daily. Recent evidence suggests that taking vitamin B3 (500 mg twice daily) can reduce the risk of actinic keratoses and non-melanoma skin cancers. Side effects of vitamin B3 include flushing and headache.
Wartpeel Counseling:  I discussed with the patient the risks of Wartpeel including but not limited to erythema, scaling, itching, weeping, crusting, and pain.
Birth Control Pills Pregnancy And Lactation Text: This medication should be avoided if pregnant and for the first 30 days post-partum.
Dapsone Pregnancy And Lactation Text: This medication is Pregnancy Category C and is not considered safe during pregnancy or breast feeding.
Infliximab Counseling:  I discussed with the patient the risks of infliximab including but not limited to myelosuppression, immunosuppression, autoimmune hepatitis, demyelinating diseases, lymphoma, and serious infections.  The patient understands that monitoring is required including a PPD at baseline and must alert us or the primary physician if symptoms of infection or other concerning signs are noted.
Minocycline Counseling: Patient advised regarding possible photosensitivity and discoloration of the teeth, skin, lips, tongue and gums.  Patient instructed to avoid sunlight, if possible.  When exposed to sunlight, patients should wear protective clothing, sunglasses, and sunscreen.  The patient was instructed to call the office immediately if the following severe adverse effects occur:  hearing changes, easy bruising/bleeding, severe headache, or vision changes.  The patient verbalized understanding of the proper use and possible adverse effects of minocycline.  All of the patient's questions and concerns were addressed.
Qbrexza Counseling:  I discussed with the patient the risks of Qbrexza including but not limited to headache, mydriasis, blurred vision, dry eyes, nasal dryness, dry mouth, dry throat, dry skin, urinary hesitation, and constipation.  Local skin reactions including erythema, burning, stinging, and itching can also occur.
Cyclosporine Counseling:  I discussed with the patient the risks of cyclosporine including but not limited to hypertension, gingival hyperplasia,myelosuppression, immunosuppression, liver damage, kidney damage, neurotoxicity, lymphoma, and serious infections. The patient understands that monitoring is required including baseline blood pressure, CBC, CMP, lipid panel and uric acid, and then 1-2 times monthly CMP and blood pressure.
Tranexamic Acid Pregnancy And Lactation Text: It is unknown if this medication is safe during pregnancy or breast feeding.
Terbinafine Counseling: Patient counseling regarding adverse effects of terbinafine including but not limited to headache, diarrhea, rash, upset stomach, liver function test abnormalities, itching, taste/smell disturbance, nausea, abdominal pain, and flatulence.  There is a rare possibility of liver failure that can occur when taking terbinafine.  The patient understands that a baseline LFT and kidney function test may be required. The patient verbalized understanding of the proper use and possible adverse effects of terbinafine.  All of the patient's questions and concerns were addressed.
Oxybutynin Counseling:  I discussed with the patient the risks of oxybutynin including but not limited to skin rash, drowsiness, dry mouth, difficulty urinating, and blurred vision.
Olumiant Counseling: I discussed with the patient the risks of Olumiant therapy including but not limited to upper respiratory tract infections, shingles, cold sores, and nausea. Live vaccines should be avoided.  This medication has been linked to serious infections; higher rate of mortality; malignancy and lymphoproliferative disorders; major adverse cardiovascular events; thrombosis; gastrointestinal perforations; neutropenia; lymphopenia; anemia; liver enzyme elevations; and lipid elevations.
Taltz Counseling: I discussed with the patient the risks of ixekizumab including but not limited to immunosuppression, serious infections, worsening of inflammatory bowel disease and drug reactions.  The patient understands that monitoring is required including a PPD at baseline and must alert us or the primary physician if symptoms of infection or other concerning signs are noted.

## 2023-03-17 NOTE — PROCEDURE: KOH PREP
Detail Level: Detailed
Cpt Desired: 
Showing: negative findings within normal realm
Koh Intro Text (From The.....): A KOH prep was ordered and evaluated from the left arm
Koh Procedure Text (Tissue Harvesting Technique): A 15-blade scalpel was used to scrape the skin. The skin scrapings were placed on a glass slide, covered with a coverslip and a KOH solution was applied.

## 2023-05-04 ENCOUNTER — TELEPHONE ENCOUNTER (OUTPATIENT)
Dept: URBAN - METROPOLITAN AREA CLINIC 86 | Facility: CLINIC | Age: 68
End: 2023-05-04

## 2023-05-22 ENCOUNTER — LAB OUTSIDE AN ENCOUNTER (OUTPATIENT)
Dept: URBAN - METROPOLITAN AREA TELEHEALTH 2 | Facility: TELEHEALTH | Age: 68
End: 2023-05-22

## 2023-05-22 ENCOUNTER — TELEPHONE ENCOUNTER (OUTPATIENT)
Dept: URBAN - METROPOLITAN AREA CLINIC 86 | Facility: CLINIC | Age: 68
End: 2023-05-22

## 2023-05-22 NOTE — HPI-TODAY'S VISIT:
Dear Mary White, May 22 ultrasound shows liver to have increased echogenicity, with scattered punctate echogenic foci that are characterized as calcified granulomas on your prior CT. No dilated bile ducts were seen and the common bile duct was normal at 4 mm. Gallbladder also appeared normal and Cornelius sign was negative. Pancreas was normal were seen. Right kidney was 12.5 cm with no hydronephrosis left kidney was 9 cm with redemonstrated cyst at the left upper pole measuring 1.4 x 1.9 x 1.4 cm. Spleen measures 12.2 cm. Liver vessels were not fully seen as the hepatic and portal veins were normal but the main hepatic artery was obscured but they did see the left hepatic artery. In summary, they felt the liver appeared to have some fatty changes. As you recall, your November MRI showed us that the liver was still fatty at 15.3% and the partial left nephrectomy changes were better seen on the MRI with them also mentioning the left renal cyst as well. Please share with primary providers and we will discuss more at the next visit. Dr. Lomeli

## 2023-05-25 ENCOUNTER — TELEPHONE ENCOUNTER (OUTPATIENT)
Dept: URBAN - METROPOLITAN AREA CLINIC 86 | Facility: CLINIC | Age: 68
End: 2023-05-25

## 2023-05-25 LAB
A/G RATIO: 1.4
ABSOLUTE BASOPHILS: 70
ABSOLUTE EOSINOPHILS: 308
ABSOLUTE LYMPHOCYTES: 2042
ABSOLUTE MONOCYTES: 510
ABSOLUTE NEUTROPHILS: 5870
ALBUMIN: 4.3
ALKALINE PHOSPHATASE: 57
ALT (SGPT): 13
AST (SGOT): 16
BASOPHILS: 0.8
BILIRUBIN, TOTAL: 0.3
BUN/CREATININE RATIO: (no result)
BUN: 14
CALCIUM: 9.1
CARBON DIOXIDE, TOTAL: 25
CHLORIDE: 106
CREATININE: 0.89
EGFR: 70
EOSINOPHILS: 3.5
GLOBULIN, TOTAL: 3.1
GLUCOSE: 94
HCV RNA, QUANTITATIVE: <1.18
HCV RNA, QUANTITATIVE: <15
HEMATOCRIT: 34.5
HEMOGLOBIN: 12
INR: 1
LYMPHOCYTES: 23.2
MCH: 30.8
MCHC: 34.8
MCV: 88.5
MONOCYTES: 5.8
MPV: 10.1
NEUTROPHILS: 66.7
PLATELET COUNT: 311
POTASSIUM: 4.6
PROTEIN, TOTAL: 7.4
PT: 10.1
RDW: 13.1
RED BLOOD CELL COUNT: 3.9
SODIUM: 139
WHITE BLOOD CELL COUNT: 8.8

## 2023-05-25 NOTE — HPI-TODAY'S VISIT:
Dear Mary White, May 22 labs show hep C PCR less than 15 and not mentioned as been detected which is good to note.  INR normal at 1.0 glucose 94 BUN of 14 creatinine 0.89 sodium 139 potassium 4.6 calcium 9.1 albumin 4.3 bilirubin 0.3 alk phos 57 AST is 16 and ALT of 13 with ideal ALT less than 25. WBC 8.8 hemoglobin 12 with hematocrit slightly low at 34.5 and was previously 36.1.  MCV 88.5 normal and platelet count 311.  Neutrophils and lymphocytes both normal. Unclear why hematocrit is slightly lower this time as this time you had the same exact hemoglobin of 12 as in November 2022 with the hematocrit time being 36.1 then.  Could be lab variation.  Normal is from 35 up to 45% so todays was only slightly off. May be good to repeat with your primary provider. See you soon to review this with you on June 1. Dr. Lomeli

## 2023-06-01 ENCOUNTER — OFFICE VISIT (OUTPATIENT)
Dept: URBAN - METROPOLITAN AREA TELEHEALTH 2 | Facility: TELEHEALTH | Age: 68
End: 2023-06-01
Payer: MEDICARE

## 2023-06-01 VITALS — HEIGHT: 66 IN | WEIGHT: 200 LBS | BODY MASS INDEX: 32.14 KG/M2

## 2023-06-01 DIAGNOSIS — E66.9 OBESITY: ICD-10-CM

## 2023-06-01 DIAGNOSIS — K74.00 HEPATIC FIBROSIS: ICD-10-CM

## 2023-06-01 DIAGNOSIS — K76.9 LIVER LESION: ICD-10-CM

## 2023-06-01 DIAGNOSIS — B18.2 CHRONIC ACTIVE HEPATITIS C: ICD-10-CM

## 2023-06-01 PROCEDURE — 99214 OFFICE O/P EST MOD 30 MIN: CPT

## 2023-06-01 RX ORDER — HYDROCHLOROTHIAZIDE 12.5 MG/1
0.5 TABLET IN THE MORNING TABLET ORAL ONCE A DAY
Status: ACTIVE | COMMUNITY

## 2023-06-01 RX ORDER — COMPOUNDING SYRUP VEHICLE 1 G/ML
AS DIRECTED SYRUP ORAL
Status: ACTIVE | COMMUNITY

## 2023-06-01 RX ORDER — ASCORBIC ACID 125 MG
AS DIRECTED TABLET,CHEWABLE ORAL
Status: ACTIVE | COMMUNITY

## 2023-06-01 RX ORDER — DILTIAZEM HYDROCHLORIDE 240 MG/1
TAKE 1 CAPSULE (240 MG) BY ORAL ROUTE ONCE DAILY CAPSULE, COATED, EXTENDED RELEASE ORAL 1
Qty: 0 | Refills: 0 | Status: ACTIVE | COMMUNITY
Start: 1900-01-01

## 2023-06-01 RX ORDER — ESTRADIOL 1 MG/1
TAKE 1 TABLET (1 MG) BY ORAL ROUTE ONCE DAILY TABLET ORAL 1
Qty: 0 | Refills: 0 | Status: ACTIVE | COMMUNITY
Start: 1900-01-01

## 2023-06-01 RX ORDER — LEVOTHYROXINE SODIUM 100 UG/1
TAKE 1 TABLET (100 MCG) BY ORAL ROUTE ONCE DAILY TABLET ORAL 1
Qty: 0 | Refills: 0 | Status: ACTIVE | COMMUNITY
Start: 1900-01-01

## 2023-06-01 RX ORDER — UBIDECARENONE/VIT E ACET 100MG-5
1 CAPSULE CAPSULE ORAL ONCE A DAY
Status: ACTIVE | COMMUNITY

## 2023-06-01 RX ORDER — MAGNESIUM OXIDE 400 MG/1
1 TABLET AS NEEDED TABLET ORAL ONCE A DAY
Status: ACTIVE | COMMUNITY

## 2023-06-01 RX ORDER — LISINOPRIL 10 MG/1
1 TABLET TABLET ORAL ONCE A DAY
Status: ACTIVE | COMMUNITY

## 2023-06-01 NOTE — HPI-TODAY'S VISIT:
Pt is 68 year old /White female, after a previous visit Dec 2022 for an evaluation for hepatitis C and on treatment evaluation of her newly diagnosed cirrhosis by fibroscan.   A copy of the note john be sent to the referring provider.   May 22 labs show hep C PCR less than 15 and not mentioned as been detected which is good to note.  INR normal at 1.0 glucose 94 BUN of 14 creatinine 0.89 sodium 139 potassium 4.6 calcium 9.1 albumin 4.3 bilirubin 0.3 alk phos 57 AST is 16 and ALT of 13 with ideal ALT less than 25. WBC 8.8 hemoglobin 12 with hematocrit slightly low at 34.5 and was previously 36.1.  MCV 88.5 normal and platelet count 311.  Neutrophils and lymphocytes both normal. Unclear why hematocrit is slightly lower this time as this time you had the same exact hemoglobin of 12 as in November 2022 with the hematocrit time being 36.1 then.  Could be lab variation.  Normal is from 35 up to 45% so todays was only slightly off.   May 22 ultrasound shows liver to have increased echogenicity, with scattered punctate echogenic foci that are characterized as calcified granulomas on your prior CT. No dilated bile ducts were seen and the common bile duct was normal at 4 mm. Gallbladder also appeared normal and Cornelius sign was negative. Pancreas was normal were seen. Right kidney was 12.5 cm with no hydronephrosis left kidney was 9 cm with redemonstrated cyst at the left upper pole measuring 1.4 x 1.9 x 1.4 cm. Spleen measures 12.2 cm. Liver vessels were not fully seen as the hepatic and portal veins were normal but the main hepatic artery was obscured but they did see the left hepatic artery. In summary, they felt the liver appeared to have some fatty changes. As you recall, your November MRI showed us that the liver was still fatty at 15.3% and the partial left nephrectomy changes were better seen on the MRI with them also mentioning the left renal cyst as well. Please share with primary providers and we will discuss more at the next visit.  She says has not seen Dr Sanderson recently.   November 16 labs show glucose slightly up at 100 and was previously 88 in May but last year November was slightly up at 106.  Please share with primary provider. BUN of 14 creatinine 0.92 down from 1.0 and prior 1.08 so good to see that trend Down. Sodium 141 potassium 4.7 albumin 4.7 bilirubin 0.3 alkaline phosphatase 79 AST 17 ALT 19 with ideal ALT less than 25. White blood cell count 8.8 hemoglobin 12 platelet count 332 MCV 93.  INR normal at 0.9.  Hep C PCR remains less than 12 but not mentioned as being detected. Meld 6 and meld na 6 so remains low.  November 14 MRI showed clear lung bases. No suspicious liver lesions with liver fat actually elevated at 15.3% which would be in the grade 1 liver fat range of 6.5 up to 17.4%. Unremarkable gallbladder seen and no bile duct dilation. No splenomegaly was seen. No main pancreas duct dilation seen. No adrenal nodule seen. Prior partial left nephrectomy changes seen.  No evidence of local recurrence.  Left renal cyst was seen and no hydronephrosis was seen. No suspicious lymph nodes were seen. Overall postsurgical changes were seen in the anterior abdominal wall. Liver fat was seen at 15.3% which is considered in the mild range desired is less than 6%.  You need to work on that. Good to see that there was no local recurrence or metastatic disease seen in the abdomen.   May 23: afp normal 3.0. Wbc 8.7 and hg 12.2 and hct 37.4 and mcv 93 and plat 348.neutrophils 5.9 and lymphs 1.9. Glu 88 and bun 13 and cr 1.00 and na 137 and k 4.6 and cl 99 and co2 19 and ca 9.4 and alb 4.5 and tb 0.2 and alk 66 and ast 16 and alt 14. ( ast 21 and alt 17 prior and ideal alt is less than 25 so doing well.) HCV pcr negative. Inr 0.9. Meld 6 and meld na 6.  May 23 MRI released to me. Lower thorax was normal. Liver  remains fatty with the liver being at 13.4% but it is slightly lower than 15 to 16% back in May 2021.  Ideal fat is less than 6% so we need to keep working on that. They see a lobular hepatic contour with subtle nodular enhancement without overt morphologic changes of chronic liver disease. Redemonstrated 6 mm segment 2 internal enhancing focus seen with alcohol and they felt as a perfusion anomaly.  Another nonspecific 6 mm enhancing and diffusion restriction nodule seen in the capsule stable since November 2020.  We will plan to do another MRI in 6 months to look at these. Gallbladder/biliary tree normal. Spleen, pancreas, and adrenals normal. Postsurgical changes seen of the partial left nephrectomy without evidence of recurrence.  Redemonstrated left renal cyst seen. Small periportal, mesenteric and retroperitoneal lymph nodes seen which are stable since November 2020. Replaced left hepatic artery seen arising from the left gastric artery.  This is an anatomic variant.  Accessory replaced right hepatic artery seen arising from the superior mesenteric artery as well which is again another anatomic variant. Some degenerative changes seen of the spine.   November 2021 labs show white cell count 9.5 hemoglobin 12.3 platelet count 336 MCV 91 neutrophils 6.3 lymphocytes 2.2.  These are all normal range.   Glucose slightly up at 106 previously was 82.  Maybe not fasting this day but she says it was and so may want to share with local provider. BUN of 19 creatinine 1.08 which is slightly up and previously was 1.03 but prior also was 1.05 last year. Sodium 136 potassium 4.4 calcium 9.3 albumin 4.5 bilirubin 0.3 alkaline phosphatase 67 AST 21 ALT 17.  Previously AST 24 and ALT 20. Hep C PCR less than 12 and not detected. INR normal at 0.9. Meld low at 7 and meld na 7.  She says that she has not gained weight and not exercising due to feet issues due to gout and gout is doing better and she is having the issues.  She says on cardizem cd and enalapril 10/25mg for her bp.  November 1 MRI back. Lower thorax shows median sternotomy wires. Liver showed fat deposition and redemonstration of mild surface nodularity. No fat quant given. There are some perfusion abnormalities that are seen throughout the liver and that requires a 6-month follow-up surveillance of this. Gallbladder and biliary tree appeared to be normal. Spleen was normal. Pancreas was normal. Subcentimeter left simple cyst seen in the kidney.  Status post left partial nephrectomy noted.  No evidence of local recurrence seen. Lymph nodes normal. Liver vessels normal. Retroperitoneum reported to be normal. No aggressive osseous lesions and postsurgical changes seen in the ventral abdominal midline. Overall they saw postsurgical changes of partial left nephrectomy with no recurrent or metastatic disease seen.  The liver is fatty appearing but they did not state by how much fat percentage. Sometimes technically they are not able to do that measurement.   May 11: hcv pcr less than 12 and so great to see. Ast 24 and alt 20 and alk 63 and tb 0.3 and ca 9.5 and na 140 and k 4.3 and cr 1.03 and bun 13. Glu 82. Wbc 9.5 and hg 12.9 and plat 363. Mcv 91.  Prior cr 1.05 and so little lower now 1.03.   May 11 2021 MRI shows the liver to be fatty at 15 to 16%.  Desired is less than 6%.  Minimal surface nodularity still seen in the liver but without definite findings of cirrhosis. Redemonstrated 6 mm segment to hyperechoic lesion seen which they feel is a perfusion anomaly.  We should check on that in 6 months. Gallbladder biliary tree were normal.  Spleen normal.  Pancreas normal.  Postsurgical changes seen of partial left nephrectomy but without evidence of recurrence seen. Redemonstrated subcentimeter left renal cyst seen. They mention that you have an anatomic variant of the replaced left hepatic artery arising from the left gastric artery.  Also accessory or replaced right hepatic artery arising from the superior mesenteric artery. Postsurgical changes also seen along the anterior abdominal wall. Overall they found no evidence of recurrent or metastatic disease within the abdomen from the previous left partial nephrectomy and the liver was fatty at 15 to 16% fat.  Nov 2020 10 and inr 0.9 and hcv  less than 12. ast 18 and alt 16 so at ideal. Tb 0.2 and alk 66. na 137 and k 4.2 and cl 99 and co2 24 and bun 17 and cr 1.05 slightly up and glu 122 elevated and maybe not fasting and show local md. wbc 8.7 hg 12.1 plat 338. mcv 93.    Dr Sanderson: usually sees once a year.  Nov 13 2020 na 139 and k 3.5 and cl 103 and bun 18 and cr 1.30 and glu 131 and alb 4.2 and tb 0.3 and ast 17 and alt 15 and alk 56 and wbc 10.3 and hg 12.6 plat 329.    		 Document Type:	MRI Abdomen w/ + w/o Contrast Document Date:	November 10, 2020 11:36  Document Status:	Auth (Verified) Document Title:	MRI Abdomen w/ + w/o Contrast Performed By:	Juanpablo Reza  Verified By:	Juanpablo Reza on November 10, 2020 13:26  Encounter info:	[id number], [facility], Single Visit OP, 11/10/2020 - 11/10/2020   * Final Report * IMPRESSION: Prior left partial nephrectomy without locally recurrent or metastatic disease within the abdomen.  Signature Line *** Final ***  Electronically Signed By:  Juanpablo Reza on  11/10/2020 13:26  Dictated by:  Juanpablo Reza    Nov 7 2019 glu 98 and cr 1.11 slightly up and na 138 and k 4.4 and cl 100 and total protein 8.2 slightly up. alb 4.6 and tb 0.4 and alk 61 and ast 18 and alt 17 wbc 9.5 and hg 12.9 and plat 335.  HCV pcr neg.  mri 2019:   Document Type: MRI Abdomen w/ + w/o Contrast Document Date: November 06, 2019 10:37  Document Status: Auth (Verified) Document Title: MRI Abdomen w/ + w/o Contrast Performed By: Geoff Farah IV  Verified By: Geoff Farah IV on November 06, 2019 11:23  Encounter info: [id number], [facility], Single Visit OP, 11/6/2019 - 11/6/2019 IMPRESSION:      1. There is marked hepatic steatosis. Morphologically normal liver. No suspicious lesions. No stigmata of portal hypertension. 2. Stable postsurgical defect in the left kidney with no enhancing lesions.   Signature Line *** Final ***  Electronically Signed By:  Geoff Farah IV on  11/06/2019 11:23  Dictated by:  Geoff Farah IV    Document Type: XR Chest 2 Views PA + Lat Stnd Protocol Document Date: November 06, 2019 11:08  Document Status: Auth (Verified) Document Title: XR Chest 2 Views PA + Lat Stnd Protocol Performed By: Kwadwo Hare  Verified By: Kwadwo Hare on November 06, 2019 13:26  Encounter info: [id number], [facility], Single Visit OP, 11/6/2019 - 11/6/2019 IMPRESSION:  Minimal basilar atelectasis. CT chest has increased sensitivity for metastatic disease.   Signature Line *** Final ***  Electronically Signed By:  Kwadwo Hare on  11/06/2019 13:26  Dictated by:  Kwadwo Hare  She had a s/p robotic L partial nephrectomy showing papillary RCC in April 2018.   She had neurological issues and she saw Dr. Bazzi and not noted to have brain tumor, noted to have hemorrhorage, she has vascular abnormality. He referred to Dr Looney. She had a cath and looked at flow and saw an anomaly. Says also told re radiation. Nothing done and she says to return prn to see Dr Looney.   She has not seen him or anyone for this again and no other issues.  She has not done the covid 19 vaccine and says she does not feel needs it.   did get one of them.    Prior labs 2019 na 136 and k 4.0 and alb 4.4 and tb 0.4 and alk 51 and ast 14 and alt 12 and wbc 9.0 and hg 12.5 and plat 282.afp 5.3 normal and hcv pcr negative.  March 2019 u/s:  Document Type: US Abdomen Complete Document Date: March 12, 2019 09:52  Document Status: Auth (Verified) Document Title: US Abdomen Complete Performed By: Paxton Ray  Verified By: Kalpesh Cisneros on March 12, 2019 10:31  Encounter info: [id number], [facility], Single Visit OP, 3/12/2019 - 3/12/2019  * Final Report *  Reason For Exam HEPATITIS C  REPORT EXAM: US Abdomen Complete, US Abdomen Doppler Complete  CLINICAL INDICATION: HEPATITIS C. elevated AFP; Port HTN; left renal cell carcinoma IMPRESSION: 1. Coarsened hepatic echotexture with increased echogenicity consistent with chronic liver disease/hepatic steatosis. No suspicious liver lesions. Patent hepatic vasculature with normal direction of flow. 2. Punctate calcifications within the liver and spleen, likely the sequela of old granulomatous disease.  The images were reviewed and interpreted by Kalpesh Cisneros MD.  Signature Line *** Final ***  Electronically Signed By:  Kalpesh Cisneros on  03/12/2019 10:31  Dictated by:  Paxton Ray   IR report showed Nov 15 2018 grade 2 medial parietal avm.  9/11/18 labs gluc 91 cr 0.96 alp 56 tb 0.5 ast 16 alt 11 wbc 7 hgb 12.6 plt 304 apf 5 hep c negative  june 2018 u/s: * Final Report *  Reason For Exam Hepatitis C, chronic  REPORT EXAM: US Abdomen Doppler Complete, US Abdomen Complete  CLINICAL INDICATION: Hepatitis C, chronic. partial L nephrectomy  TECHNIQUE: Grayscale, pulsed wave and color Doppler sonography of the upper abdomen were performed.  COMPARISON: December 12, 2017  FINDINGS:  IMPRESSION: 1. Echogenic liver with nodular contour suggestive of hepatic steatosis/chronic liver disease. 2. Patent hepatic vasculature.  Signature Line *** Final ***  Electronically Signed By:  Kalpesh Cisneros on  06/07/2018 10:40  july 2018 ct c/a/p no acute findings, colonic diverticulosis, atherosclerosis, granulomatous dz noted in liver. post surgical changes suggested involvoing the left kidney  ct chead july 2018 acute parenchymal hemorrhage in right parietal lobe  4/16/18 kidney path:  KIDNEY, LEFT, MASS, ROBOTIC PARTIAL NEPHRECTOMY: Renal cell carcinoma, papillary type 1, greatest carcinoma dimension 4.4 cm Carcinoma is limited to the kidney Pedro nuclear grade G2 Clear cell change in approximately 10% tumor, no sarcomatoid change identified Carcinoma at inked parenchymal margin of excision (tumor grossly perforated) Uninvolved renal parenchyma with chronic interstitial nephritis  LYMPH NODES, PARA-AORTIC, EXCISION: No metastatic carcinoma identified in three lymph nodes (0/3) Focus of endosalpingiosis in one lymph node  SOFT TISSUE, UMM-TUMOR FAT, EXCISION: Benign fibroadipose tissue No carcinoma identified   She did colon with dr Mcgee and do in 5 yrs 2021.  Seing Dr Shields april 2022 and he said she was stable.  Plan: 1. U.s of liver in 6m  to check on the liver. 2. Pt says she will follow up with renal re her hx of renal cancer. 3. April saw Dr Shields and he will resee in 1 yr for the renal issues.  Stressed to pt the need for social distancing and strict handwashing and wearing a mask and to follow any other new or added CDC recommendations as this is an evolving target.  Duration of visit 30 mins with 10 min of prep and then 20 min from 155 to 215 pm by clock today by dox audio due to internet issues with over 50% of the time explaining pt's condition and treatment plan with the pt.

## 2023-08-01 ENCOUNTER — TELEPHONE ENCOUNTER (OUTPATIENT)
Dept: URBAN - METROPOLITAN AREA CLINIC 86 | Facility: CLINIC | Age: 68
End: 2023-08-01

## 2023-08-14 ENCOUNTER — TELEPHONE ENCOUNTER (OUTPATIENT)
Dept: URBAN - METROPOLITAN AREA CLINIC 86 | Facility: CLINIC | Age: 68
End: 2023-08-14

## 2023-08-14 LAB
A/G RATIO: 1.3
ALBUMIN: 4.2
ALKALINE PHOSPHATASE: 55
ALT (SGPT): 13
AST (SGOT): 15
BILIRUBIN, TOTAL: 0.4
BUN/CREATININE RATIO: 15
BUN: 17
CALCIUM: 9.1
CARBON DIOXIDE, TOTAL: 24
CHLORIDE: 103
CREATININE: 1.14
EGFR: 52
GLOBULIN, TOTAL: 3.2
GLUCOSE: 93
HCV RNA, QUANTITATIVE: <1.18
HCV RNA, QUANTITATIVE: <15
HEPATITIS B SURFACE ANTIGEN: (no result)
POTASSIUM: 4.4
PROTEIN, TOTAL: 7.4
SODIUM: 138

## 2023-08-14 NOTE — HPI-TODAY'S VISIT:
Dear Mary White, August 11 labs show hep C PCR remains less than 15 and not mention has been detected. Glucose was 93, BUN is 17 but your creatinine was up to 1.14 from 0.89 and that could be related due to the heat wave that we are facing.  You may want to increase your hydration and repeat that with your primary provider. Sodium 138 potassium 4.4 albumin 4.2 bilirubin 0.4 alk phos 55 AST 15 ALT 13 so the liver labs remain stable.  Your hep B test which you were concerned about was noted to be negative with the B surface antigen being clearly not active. I am glad that you did these labs so that we could confirm that information for you. If some of the labs elsewhere had suggested that they were positive please let us know. Dr. Lomeli

## 2023-11-07 ENCOUNTER — TELEPHONE ENCOUNTER (OUTPATIENT)
Dept: URBAN - METROPOLITAN AREA CLINIC 91 | Facility: CLINIC | Age: 68
End: 2023-11-07

## 2023-11-07 ENCOUNTER — LAB OUTSIDE AN ENCOUNTER (OUTPATIENT)
Dept: URBAN - METROPOLITAN AREA CLINIC 91 | Facility: CLINIC | Age: 68
End: 2023-11-07

## 2023-11-27 ENCOUNTER — LAB OUTSIDE AN ENCOUNTER (OUTPATIENT)
Dept: URBAN - METROPOLITAN AREA TELEHEALTH 2 | Facility: TELEHEALTH | Age: 68
End: 2023-11-27

## 2023-11-30 ENCOUNTER — TELEPHONE ENCOUNTER (OUTPATIENT)
Dept: URBAN - METROPOLITAN AREA CLINIC 86 | Facility: CLINIC | Age: 68
End: 2023-11-30

## 2023-11-30 LAB
A/G RATIO: 1.4
ABSOLUTE BASOPHILS: 82
ABSOLUTE EOSINOPHILS: 309
ABSOLUTE LYMPHOCYTES: 2321
ABSOLUTE MONOCYTES: 610
ABSOLUTE NEUTROPHILS: 5779
ALBUMIN: 4.5
ALKALINE PHOSPHATASE: 61
ALT (SGPT): 14
AST (SGOT): 16
BASOPHILS: 0.9
BILIRUBIN, TOTAL: 0.4
BUN/CREATININE RATIO: (no result)
BUN: 14
CALCIUM: 9.5
CARBON DIOXIDE, TOTAL: 26
CHLORIDE: 103
CREATININE: 0.95
EGFR: 64
EOSINOPHILS: 3.4
GLOBULIN, TOTAL: 3.3
GLUCOSE: 102
HCV RNA, QUANTITATIVE: <1.18
HCV RNA, QUANTITATIVE: <15
HEMATOCRIT: 39.5
HEMOGLOBIN: 13.2
LYMPHOCYTES: 25.5
MCH: 29.9
MCHC: 33.4
MCV: 89.4
MONOCYTES: 6.7
MPV: 10.2
NEUTROPHILS: 63.5
PLATELET COUNT: 339
POTASSIUM: 4.5
PROTEIN, TOTAL: 7.8
RDW: 13.2
RED BLOOD CELL COUNT: 4.42
SODIUM: 139
WHITE BLOOD CELL COUNT: 9.1

## 2023-11-30 NOTE — HPI-TODAY'S VISIT:
Dear Mary White,  November 28 labs show WBC normal at 9.1 hemoglobin 13.2 platelet count 339 and MCV 89.4.  Neutrophils and lymphocytes normal. Glucose slightly up at 102 and unclear if you were fasting or not?  Please share with primary provider. BUN of 14 and creatinine normal at 0.95 down from 1.14 in August. Sodium 139 potassium 4.5 calcium 9.5 albumin 4.5 globin 0.4 alk phos 61 and AST of 16 ALT of 14. Hep C PCR remains less than 15 and not mention has been detected.  Good to see. Dr. Lomeli

## 2023-12-01 ENCOUNTER — TELEPHONE ENCOUNTER (OUTPATIENT)
Dept: URBAN - METROPOLITAN AREA CLINIC 86 | Facility: CLINIC | Age: 68
End: 2023-12-01

## 2023-12-01 ENCOUNTER — OFFICE VISIT (OUTPATIENT)
Dept: URBAN - METROPOLITAN AREA TELEHEALTH 2 | Facility: TELEHEALTH | Age: 68
End: 2023-12-01

## 2023-12-01 RX ORDER — LEVOTHYROXINE SODIUM 100 UG/1
TAKE 1 TABLET (100 MCG) BY ORAL ROUTE ONCE DAILY TABLET ORAL 1
Qty: 0 | Refills: 0 | Status: ACTIVE | COMMUNITY
Start: 1900-01-01

## 2023-12-01 RX ORDER — ESTRADIOL 1 MG/1
TAKE 1 TABLET (1 MG) BY ORAL ROUTE ONCE DAILY TABLET ORAL 1
Qty: 0 | Refills: 0 | Status: ACTIVE | COMMUNITY
Start: 1900-01-01

## 2023-12-01 RX ORDER — UBIDECARENONE/VIT E ACET 100MG-5
1 CAPSULE CAPSULE ORAL ONCE A DAY
Status: ACTIVE | COMMUNITY

## 2023-12-01 RX ORDER — MAGNESIUM OXIDE 400 MG/1
1 TABLET AS NEEDED TABLET ORAL ONCE A DAY
Status: ACTIVE | COMMUNITY

## 2023-12-01 RX ORDER — HYDROCHLOROTHIAZIDE 12.5 MG/1
0.5 TABLET IN THE MORNING TABLET ORAL ONCE A DAY
Status: ACTIVE | COMMUNITY

## 2023-12-01 RX ORDER — LISINOPRIL 10 MG/1
1 TABLET TABLET ORAL ONCE A DAY
Status: ACTIVE | COMMUNITY

## 2023-12-01 RX ORDER — COMPOUNDING SYRUP VEHICLE 1 G/ML
AS DIRECTED SYRUP ORAL
Status: ACTIVE | COMMUNITY

## 2023-12-01 RX ORDER — ASCORBIC ACID 125 MG
AS DIRECTED TABLET,CHEWABLE ORAL
Status: ACTIVE | COMMUNITY

## 2023-12-01 RX ORDER — DILTIAZEM HYDROCHLORIDE 240 MG/1
TAKE 1 CAPSULE (240 MG) BY ORAL ROUTE ONCE DAILY CAPSULE, COATED, EXTENDED RELEASE ORAL 1
Qty: 0 | Refills: 0 | Status: ACTIVE | COMMUNITY
Start: 1900-01-01

## 2023-12-01 NOTE — HPI-TODAY'S VISIT:
Pt is 68 year oldCaucasian/White female, after a previous visit June 2023 for an evaluationfor hepatitis C and on treatment evaluation of her newly diagnosed cirrhosis bypilar.  A copy of the note john be sent to the referringprovider.  Dear Mary White, November 28 labs show WBC normal at 9.1 [id number] platelet count 339 and MCV 89.4. Neutrophils and lymphocytes normal.Glucose slightly up at 102 and unclear if you werefasting or not? Please share with primary provider.BUN of 14 and creatinine normal at 0.95 down from1.14 in August. Sodium 139 potassium 4.5 calcium 9.5 albumin 4.5globin 0.4 alk phos 61 and AST of 16 ALT of 14.Hep C PCR remains less than 15 and not mention hasbeen detected. Good to see. Dr. Lomeli  August 11 labs show hep C PCR remains less than 15and not mention has been detected. Glucose was 93, BUN is 17 but your creatinine wasup to 1.14 from 0.89 and that could be related due to the heat wave that we arefacing. You may want to increase your hydration and repeat that with yourprimary provider. Sodium 138 potassium 4.4 albumin 4.2 bilirubin 0.4alk phos 55 AST 15 ALT 13 so the liver labs remain stable. Your hep B testwhich you were concerned about was noted to be negative with the B surfaceantigen being clearly not active. I am glad that you did these labs so that we couldconfirm that information for you. If some of the labs elsewhere had suggested thatthey were positive please let us know. Dr. Lomeli  May 22 labs show hep C PCR less than 15 and notmentioned as been detected which is good to note. INR normal at 1.0 glucose 94BUN of 14 creatinine 0.89 sodium 139 potassium 4.6 calcium 9.1 albumin 4.3bilirubin 0.3 alk phos 57 AST is 16 and ALT of 13 with ideal ALT less than 25.WBC 8.8 hemoglobin 12 with hematocrit slightly lowat 34.5 and was previously 36.1. MCV 88.5 normal and platelet count 311.Neutrophils and lymphocytes both normal. Unclear why hematocrit is slightly lower this timeas this time you had the same exact hemoglobin of 12 as in November 2022 withthe hematocrit time being 36.1 then. Could be lab variation. Normal is from 35up to 45% so todays was only slightly off.   May 22 ultrasound shows liver to have increasedechogenicity, with scattered punctate echogenic foci that are characterized ascalcified granulomas on your prior CT. No dilated bile ducts were seen and the commonbile duct was normal at 4 mm. Gallbladder also appeared normal and Cornelius signwas negative. Pancreas was normal were seen.Right kidney was 12.5 cm with no hydronephrosisleft kidney was 9 cm with redemonstrated cyst at the left upper pole measuring1.4 x 1.9 x 1.4 cm. Spleen measures 12.2 cm.Liver vessels were not fully seen as the hepaticand portal veins were normal but the main hepatic artery was obscured but theydid see the left hepatic artery. In summary, they felt the liver appeared to havesome fatty changes. As you recall, your November MRI showed us thatthe liver was still fatty at 15.3% and the partial left nephrectomy changeswere better seen on the MRI with them also mentioning the left renal cyst aswell. Please share with primary providers and we willdiscuss more at the next visit.  She says has not seen Dr Sanderson recently. November 16 labs show glucose slightly up at 100and was previously 88 in May but last year November was slightly up at 106.Please share with primary provider. BUN of 14 creatinine 0.92 down from 1.0 and prior1.08 so good to see that trend Down. Sodium 141 potassium 4.7 albumin 4.7 bilirubin 0.3alkaline phosphatase 79 AST 17 ALT 19 with ideal ALT less than 25.White blood cell count 8.8 hemoglobin 12 plateletcount 332 MCV 93. INR normal at 0.9. Hep C PCR remains less than 12 but not mentionedas being detected. Meld 6 and meld na 6 so remains low. November 14 MRI showed clear lung bases.No suspicious liver lesions with liver fatactually elevated at 15.3% which would be in the grade 1 liver fat range of 6.5up to 17.4%. Unremarkable gallbladder seen and no bile ductdilation. No splenomegaly was seen.No main pancreas duct dilation seen.No adrenal nodule seen.Prior partial left nephrectomy changes seen. Noevidence of local recurrence. Left renal cyst was seen and no hydronephrosiswas seen. No suspicious lymph nodes were seen.Overall postsurgical changes were seen in theanterior abdominal wall. Liver fat was seen at 15.3% which is considered inthe mild range desired is less than 6%. You need to work on that.Good to see that there was no local recurrence ormetastatic disease seen in the abdomen.   May 23: afp normal 3.0.Wbc 8.7 and hg 12.2 and hct 37.4 and mcv 93 andplat 348.neutrophils 5.9 and lymphs 1.9. Glu 88 and bun 13 and cr 1.00 and na 137 and k 4.6and cl 99 and co2 19 and ca 9.4 and alb 4.5 and tb 0.2 and alk 66 and ast 16and alt 14. ( ast 21 and alt 17 prior and ideal alt is less than 25 so doingwell.) HCV pcr negative.Inr 0.9. Meld 6 and meld na 6. May 23 MRI released to me.Lower thorax was normal.Liver remains fatty with the liver being at 13.4%but it is slightly lower than 15 to 16% back in May 2021. Ideal fat is lessthan 6% so we need to keep working on that. They see a lobular hepatic contour with subtlenodular enhancement without overt morphologic changes of chronic liver disease.Redemonstrated 6 mm segment 2 internal enhancingfocus seen with alcohol and they felt as a perfusion anomaly. Anothernonspecific 6 mm enhancing and diffusion restriction nodule seen in the capsulestable since November 2020. We will plan to do another MRI in 6 months to lookat these. Gallbladder/biliary tree normal.Spleen, pancreas, and adrenals normal.Postsurgical changes seen of the partial leftnephrectomy without evidence of recurrence. 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 This is an anatomic variant. Accessory replaced righthepatic artery seen arising from the superior mesenteric artery as well whichis again another anatomic variant. Some degenerative changes seen of the spine.  November 2021 labs show white cell count 9.5hemoglobin 12.3 platelet count 336 MCV 91 neutrophils 6.3 lymphocytes 2.2.These are all normal range. Glucose slightly up at 106 previously was 82.Maybe not fasting this day but she says it was and so may want to share withlocal provider. BUN of 19 creatinine 1.08 which is slightly up andpreviously was 1.03 but prior also was 1.05 last year.Sodium 136 potassium 4.4 calcium 9.3 albumin 4.5bilirubin 0.3 alkaline phosphatase 67 AST 21 ALT 17. Previously AST 24 and ALT20. Hep C PCR less than 12 and not detected.INR normal at 0.9.Meld low at 7 and meld na 7. She says that she has not gained weight and notexercising due to feet issues due to gout and gout is doing better and she ishaving the issues.  She says on cardizem cd and enalapril 10/25mg forher bp.  November 1 MRI back.Lower thorax shows median sternotomy wires.Liver showed fat deposition and redemonstration ofmild surface nodularity. No fat quant given. There are some perfusion abnormalities that areseen throughout the liver and that requires a 6-month follow-up surveillance ofthis. Gallbladder and biliary tree appeared to be normal.Spleen was normal.Pancreas was normal.Subcentimeter left simple cyst seen in the kidney.Status post left partial nephrectomy noted. No evidence of local recurrenceseen. Lymph nodes normal.Liver vessels normal.Retroperitoneum reported to be normal.No aggressive osseous lesions and postsurgicalchanges seen in the ventral abdominal midline.Overall they saw postsurgical changes of partialleft nephrectomy with no recurrent or metastatic disease seen. The liver isfatty appearing but they did not state by how much fat percentage. Sometimestechnically they are not able to do that measurement.  May 11: hcv pcr less than 12 and so great to see.Ast 24 and alt 20 and alk 63 and tb 0.3 and ca 9.5 and na 140 and k 4.3 and cr1.03 and bun 13. Glu 82. Wbc 9.5 and hg 12.9 and plat 363. Mcv 91. Prior cr1.05 and so little lower now 1.03.  May 11 2021 MRI shows the liver to be fatty at 15to 16%. Desired is less than 6%. Minimal surface nodularity still seen in theliver but without definite findings of cirrhosis.Redemonstrated 6 mm segment to hyperechoic lesionseen which they feel is a perfusion anomaly. We should check on that in 6months. Gallbladder biliary tree were normal. Spleennormal. Pancreas normal. Postsurgical changes seen of partial left nephrectomybut without evidence of recurrence seen. Redemonstrated subcentimeter left renal cyst seen.They mention that you have an anatomic variant ofthe replaced left hepatic artery arising from the left gastric artery. Alsoaccessory or replaced right hepatic artery arising from the superior mesentericartery. Postsurgical changes also seen along the anteriorabdominal wall. Overall they found no evidence of recurrent ormetastatic disease within the abdomen from the previous left partialnephrectomy and the liver was fatty at 15 to 16% fat. Nov 2020 10 and inr 0.9 and hcv less than 12. ast18 and alt 16 so at ideal. Tb 0.2 and alk 66. na 137 and k 4.2 and cl 99 andco2 24 and bun 17 and cr 1.05 slightly up and glu 122 elevated and maybe notfasting and show local md. wbc 8.7 hg 12.1 plat 338. mcv 93. Dr Sanderson: usually sees once a year. Nov 13 2020 na 139 and k 3.5 and cl 103 and bun 18and cr 1.30 and glu 131 and alb 4.2 and tb 0.3 and ast 17 and alt 15 and alk 56and wbc 10.3 and hg 12.6 plat 329.   Document Type: MRI Abdomen w/ + w/o ContrastDocument Date: November 10, 2020 11:36Document Status: Auth (Verified)Document Title: MRI Abdomen w/ + w/o ContrastPerformed By: Juanpablo RezaVerified By: Juanpablo Reza on November 10,2020 13:26 Encounter info: [id number], [facility], Single Visit OP,11/10/2020 - 11/10/2020   * Final Report *IMPRESSION: Prior left partial nephrectomy withoutlocally recurrent or metastatic disease within the abdomen. Signature Line *** Final ***  Electronically Signed By: Juanpablo Reza 11/10/2020 13:26 Dictated by: Juanpablo Reza   Nov 7 2019 glu 98 and cr 1.11 slightly up and na138 and k 4.4 and cl 100 and total protein 8.2 slightly up. alb 4.6 and tb 0.4and alk 61 and ast 18 and alt 17 wbc 9.5 and hg 12.9 and plat 335. HCV pcr neg. mri 2019:  Document Type: MRI Abdomen w/ + w/o ContrastDocument Date: November 06, 2019 10:37Document Status: Auth (Verified)Document Title: MRI Abdomen w/ + w/o ContrastPerformed By: Geoff Farah IVVerified By: Geoff Farah IV on November06, 2019 11:23 Encounter info: [id number], [facility], Single Visit OP,11/6/2019 - 11/6/2019 IMPRESSION:  1. There is marked hepatic steatosis.Morphologically normal liver. No suspicious lesions. No stigmata of portalhypertension. 2. Stable postsurgical defect in the left kidneywith no enhancing lesions.   Signature Line *** Final ***  Electronically Signed By: Geoff Farah IVon 11/06/2019 11:23 Dictated by: Geoff Farah IV  Document Type: XR Chest 2 Views PA + Lat StndProtocol Document Date: November 06, 2019 11:08Document Status: Auth (Verified)Document Title: XR Chest 2 Views PA + Lat StndProtocol Performed By: Kwadwo Hare BVerified By: Kwadwo Hare on November 06,2019 13:26 Encounter info: [id number], [facility], Single Visit OP,11/6/2019 - 11/6/2019 IMPRESSION: Minimal basilar atelectasis.CT chest has increased sensitivity for metastaticdisease.   Signature Line *** Final ***  Electronically Signed By: Kwadwo Hare 11/06/2019 13:26 Dictated by: Kwadwo Hare She had a s/p robotic L partial nephrectomyshowing papillary RCC in April 2018.  She had neurological issues and she saw  and not noted to have brain tumor, noted to have hemorrhorage, shehas vascular abnormality. He referred to Dr Looney. She had a cath and lookedat flow and saw an anomaly. Says also told re radiation. Nothing done and shesays to return prn to see Dr Looney. She has not seen him or anyone for this again andno other issues.  She has not done the covid 19 vaccine and says shedoes not feel needs it.  did get one of them. Prior labs 2019 na 136 and k 4.0 and alb 4.4 andtb 0.4 and alk 51 and ast 14 and alt 12 and wbc 9.0 and hg 12.5 and snas412.afp 5.3 normal and hcv pcr negative.  March 2019 u/s:  Document Type: US Abdomen CompleteDocument Date: March 12, 2019 09:52Document Status: Auth (Verified)Document Title: US Abdomen CompletePerformed By: Paxton Ray SVerified By: Kalpesh Cisneros on March 12,2019 10:31 Encounter info: [id number], [facility], Single Visit OP,3/12/2019 - 3/12/2019  * Final Report * Reason For Exam HEPATITIS C  REPORT EXAM: US Abdomen Complete, US Abdomen DopplerComplete  CLINICAL INDICATION: HEPATITIS C. elevated AFP;Port HTN; left renal cell carcinoma IMPRESSION: 1. Coarsened hepatic echotexture with increasedechogenicity consistent with chronic liver disease/hepatic steatosis. Nosuspicious liver lesions. Patent hepatic vasculature with normal direction of flow.2. Punctate calcifications within the liver andspleen, likely the sequela of old granulomatous disease. The images were reviewed and interpreted by Colleen Cisneros MD.  Signature Line *** Final ***  Electronically Signed By: Kalpesh Cisneros 03/12/2019 10:31 Dictated by: Paxton Ray S  IR report showed Nov 15 2018 grade 2 medialparietal avm.  9/11/18 labs gluc 91 cr 0.96 alp 56 tb 0.5 ast 16 alt 11 wbc 7 hgb 12.6 plt 304 apf 5 hep c negative  june 2018 u/s: * Final Report * Reason For Exam Hepatitis C, chronic REPORT EXAM: US Abdomen Doppler Complete, US AbdomenComplete  CLINICAL INDICATION: Hepatitis C, chronic. partialL nephrectomy  TECHNIQUE: Grayscale, pulsed wave and colorDoppler sonography of the upper abdomen were performed. COMPARISON: December 12, 2017 FINDINGS: IMPRESSION: 1. Echogenic liver with nodular contour suggestiveof hepatic steatosis/chronic liver disease. 2. Patent hepatic vasculature. Signature Line *** Final ***  Electronically Signed By: Kalpesh Cisnerso 06/07/2018 10:40 july 2018 ct c/a/pno acute findings, colonic diverticulosis,atherosclerosis, granulomatous dz noted in liver. post surgical changessuggested involvoing the left kidney  ct chead july 2018acute parenchymal hemorrhage in right parietal lobe 4/16/18 kidney path: KIDNEY, LEFT, MASS, ROBOTIC PARTIAL NEPHRECTOMY:Renal cell carcinoma, papillary type 1, greatestcarcinoma dimension 4.4 cm Carcinoma is limited to the kidneyFuhrman nuclear grade L1Atlak cell change in approximately 10% tumor, nosarcomatoid change identified Carcinoma at inked parenchymal margin of excision(tumor grossly perforated) Uninvolved renal parenchyma with chronicinterstitial nephritis  LYMPH NODES, PARA-AORTIC, EXCISION:No metastatic carcinoma identified in three lymphnodes (0/3) Focus of endosalpingiosis in one lymph node SOFT TISSUE, UMM-TUMOR FAT, EXCISION:Benign fibroadipose tissueNo carcinoma identified She did colon with dr Mcgee and do in 5 yrs 2021. Seing Dr Shields april 2022 and he said she wasstable.  Plan: 1. U.s of liver in 6m to check on the liver.2. Pt says she will follow up with renal re her hxof renal cancer. 3. April saw Dr Shields and he will resee in 1 yrfor the renal issues.  Stressed to pt the need for social distancing andstrict handwashing and wearing a mask and to follow any other new or added CDCrecommendations as this is an evolving target. Duration of visit  mins with 10 min of prepand then 20 min from 155 to 215 pm by clock today by dox audio due to internetissues with over 50% of the time explaining pt's condition and treatment planwith the pt.

## 2023-12-06 ENCOUNTER — OFFICE VISIT (OUTPATIENT)
Dept: URBAN - METROPOLITAN AREA CLINIC 86 | Facility: CLINIC | Age: 68
End: 2023-12-06
Payer: MEDICARE

## 2023-12-06 ENCOUNTER — DASHBOARD ENCOUNTERS (OUTPATIENT)
Age: 68
End: 2023-12-06

## 2023-12-06 VITALS
WEIGHT: 205 LBS | TEMPERATURE: 97.3 F | SYSTOLIC BLOOD PRESSURE: 144 MMHG | BODY MASS INDEX: 32.95 KG/M2 | HEART RATE: 64 BPM | DIASTOLIC BLOOD PRESSURE: 80 MMHG | HEIGHT: 66 IN

## 2023-12-06 DIAGNOSIS — Z79.899 HIGH RISK MEDICATION USE: ICD-10-CM

## 2023-12-06 DIAGNOSIS — I10 HYPERTENSION: ICD-10-CM

## 2023-12-06 DIAGNOSIS — M10.09 GOUT, ARTHRITIS: ICD-10-CM

## 2023-12-06 DIAGNOSIS — K25.9 GASTRIC ULCER: ICD-10-CM

## 2023-12-06 DIAGNOSIS — I63.9 CVA (CEREBRAL VASCULAR ACCIDENT): ICD-10-CM

## 2023-12-06 DIAGNOSIS — Z85.528 HISTORY OF RENAL CELL CANCER: ICD-10-CM

## 2023-12-06 DIAGNOSIS — B96.81 HELICOBACTER PYLORI (H. PYLORI) INFECTION: ICD-10-CM

## 2023-12-06 DIAGNOSIS — K76.9 LIVER LESION: ICD-10-CM

## 2023-12-06 DIAGNOSIS — E66.9 OBESITY: ICD-10-CM

## 2023-12-06 DIAGNOSIS — K74.00 HEPATIC FIBROSIS: ICD-10-CM

## 2023-12-06 DIAGNOSIS — K44.9 HIATAL HERNIA: ICD-10-CM

## 2023-12-06 DIAGNOSIS — K70.0 FATTY LIVER: ICD-10-CM

## 2023-12-06 DIAGNOSIS — G62.9 NEUROPATHY: ICD-10-CM

## 2023-12-06 DIAGNOSIS — R74.8 ABNORMAL LIVER ENZYMES: ICD-10-CM

## 2023-12-06 DIAGNOSIS — B18.2 CHRONIC ACTIVE HEPATITIS C: ICD-10-CM

## 2023-12-06 DIAGNOSIS — Z85.038 HISTORY OF COLON CANCER: ICD-10-CM

## 2023-12-06 DIAGNOSIS — C18.9 COLON CANCER: ICD-10-CM

## 2023-12-06 PROCEDURE — 99215 OFFICE O/P EST HI 40 MIN: CPT

## 2023-12-06 RX ORDER — COMPOUNDING SYRUP VEHICLE 1 G/ML
AS DIRECTED SYRUP ORAL
Status: ACTIVE | COMMUNITY

## 2023-12-06 RX ORDER — HYDROCHLOROTHIAZIDE 12.5 MG/1
0.5 TABLET IN THE MORNING TABLET ORAL ONCE A DAY
Status: ACTIVE | COMMUNITY

## 2023-12-06 RX ORDER — ESTRADIOL 1 MG/1
TAKE 1 TABLET (1 MG) BY ORAL ROUTE ONCE DAILY TABLET ORAL 1
Qty: 0 | Refills: 0 | Status: ACTIVE | COMMUNITY
Start: 1900-01-01

## 2023-12-06 RX ORDER — LEVOTHYROXINE SODIUM 100 UG/1
TAKE 1 TABLET (100 MCG) BY ORAL ROUTE ONCE DAILY TABLET ORAL 1
Qty: 0 | Refills: 0 | Status: ACTIVE | COMMUNITY
Start: 1900-01-01

## 2023-12-06 RX ORDER — UBIDECARENONE/VIT E ACET 100MG-5
1 CAPSULE CAPSULE ORAL ONCE A DAY
Status: ACTIVE | COMMUNITY

## 2023-12-06 RX ORDER — ASCORBIC ACID 125 MG
AS DIRECTED TABLET,CHEWABLE ORAL
Status: ACTIVE | COMMUNITY

## 2023-12-06 RX ORDER — LISINOPRIL 10 MG/1
1 TABLET TABLET ORAL ONCE A DAY
Status: ACTIVE | COMMUNITY

## 2023-12-06 RX ORDER — DILTIAZEM HYDROCHLORIDE 240 MG/1
TAKE 1 CAPSULE (240 MG) BY ORAL ROUTE ONCE DAILY CAPSULE, COATED, EXTENDED RELEASE ORAL 1
Qty: 0 | Refills: 0 | Status: ACTIVE | COMMUNITY
Start: 1900-01-01

## 2023-12-06 RX ORDER — MAGNESIUM OXIDE 400 MG/1
1 TABLET AS NEEDED TABLET ORAL ONCE A DAY
Status: ACTIVE | COMMUNITY

## 2023-12-06 NOTE — EXAM-PHYSICAL EXAM
Gen: awake and responsive. Eyes: anicteric, normal lids. Mouth: normal lips. Nose: no drainage. Hearing: intact grossly. Neck: trachea midline and no jvd. CV: RRR no s3. Lungs: clear. No wheezes. Abd: Soft, nabs, nr,NT. No appreciable hsm by exam.  Ext: no sig edema, and some palm erythema. Neuro: moves all 4 ext grossly. No asterixis. Skin: no pruritis and no some palm erythema.

## 2023-12-06 NOTE — HPI-TODAY'S VISIT:
Pt is 68 year oldCaucasian/White female, after a previous visit June 2023 for an evaluationfor hepatitis C and on treatment evaluation of her newly diagnosed cirrhosis bypilar.  A copy of the note john be sent to the referringprovider.  Pt had issues with her phone last time to do telemed so cvame in.   November 28 labs show WBC normal at 9.1 [id number] platelet count 339 and MCV 89.4. Neutrophils and lymphocytes normal.Glucose slightly up at 102 and unclear if you were fasting or not? Please share with primary provider.BUN of 14 and creatinine normal at 0.95 down from1.14 in August. Sodium 139 potassium 4.5 calcium 9.5 albumin 4.5  globin 0.4 alk phos 61 and AST of 16 ALT of 14.Hep C PCR remains less than 15 and not mentioned as being detected.     August 11 labs show hep C PCR remains less than 15and not mention has been detected. Glucose was 93, BUN is 17 but your creatinine wasup to 1.14 from 0.89 and that could be related due to the heat wave that we arefacing. You may want to increase your hydration and repeat that with yourprimary provider. Sodium 138 potassium 4.4 albumin 4.2 bilirubin 0.4alk phos 55 AST 15 ALT 13 so the liver labs remain stable. Your hep B testwhich you were concerned about was noted to be negative with the B surfaceantigen being clearly not active.   May 22 labs show hep C PCR less than 15 and notmentioned as been detected which is good to note. INR normal at 1.0 glucose 94BUN of 14 creatinine 0.89 sodium 139 potassium 4.6 calcium 9.1 albumin 4.3bilirubin 0.3 alk phos 57 AST is 16 and ALT of 13 with ideal ALT less than 25.WBC 8.8 hemoglobin 12 with hematocrit slightly lowat 34.5 and was previously 36.1. MCV 88.5 normal and platelet count 311.Neutrophils and lymphocytes both normal. Unclear why hematocrit is slightly lower this timeas this time you had the same exact hemoglobin of 12 as in November 2022 withthe hematocrit time being 36.1 then. Could be lab variation. Normal is from 35up to 45% so todays was only slightly off.  She did not do the u.s Nov 28 at [facility]. Yusra is calling for this.   May 22 ultrasound shows liver to have increasedechogenicity, with scattered punctate echogenic foci that are characterized ascalcified granulomas on your prior CT. No dilated bile ducts were seen and the commonbile duct was normal at 4 mm. Gallbladder also appeared normal and Cornelius signwas negative. Pancreas was normal were seen.Right kidney was 12.5 cm with no hydronephrosisleft kidney was 9 cm with redemonstrated cyst at the left upper pole measuring1.4 x 1.9 x 1.4 cm. Spleen measures 12.2 cm.Liver vessels were not fully seen as the hepaticand portal veins were normal but the main hepatic artery was obscured but theydid see the left hepatic artery. In summary, they felt the liver appeared to havesome fatty changes. As you recall, your November MRI showed us thatthe liver was still fatty at 15.3% and the partial left nephrectomy changeswere better seen on the MRI with them also mentioning the left renal cyst aswell. Please share with primary providers and we willdiscuss more at the next visit.  She says has not seen Dr Sanderson recently. November 16 labs show glucose slightly up at 100and was previously 88 in May but last year November was slightly up at 106.Please share with primary provider. BUN of 14 creatinine 0.92 down from 1.0 and prior1.08 so good to see that trend Down. Sodium 141 potassium 4.7 albumin 4.7 bilirubin 0.3alkaline phosphatase 79 AST 17 ALT 19 with ideal ALT less than 25.White blood cell count 8.8 hemoglobin 12 plateletcount 332 MCV 93. INR normal at 0.9. Hep C PCR remains less than 12 but not mentionedas being detected. Meld 6 and meld na 6 so remains low. November 14 MRI showed clear lung bases.No suspicious liver lesions with liver fatactually elevated at 15.3% which would be in the grade 1 liver fat range of 6.5up to 17.4%. Unremarkable gallbladder seen and no bile ductdilation. No splenomegaly was seen.No main pancreas duct dilation seen.No adrenal nodule seen.Prior partial left nephrectomy changes seen. Noevidence of local recurrence. Left renal cyst was seen and no hydronephrosiswas seen. No suspicious lymph nodes were seen.Overall postsurgical changes were seen in theanterior abdominal wall. Liver fat was seen at 15.3% which is considered inthe mild range desired is less than 6%. You need to work on that.Good to see that there was no local recurrence ormetastatic disease seen in the abdomen.   May 23: afp normal 3.0.Wbc 8.7 and hg 12.2 and hct 37.4 and mcv 93 andplat 348.neutrophils 5.9 and lymphs 1.9. Glu 88 and bun 13 and cr 1.00 and na 137 and k 4.6and cl 99 and co2 19 and ca 9.4 and alb 4.5 and tb 0.2 and alk 66 and ast 16and alt 14. ( ast 21 and alt 17 prior and ideal alt is less than 25 so doingwell.) HCV pcr negative.Inr 0.9. Meld 6 and meld na 6. May 23 MRI released to me.Lower thorax was normal.Liver remains fatty with the liver being at 13.4%but it is slightly lower than 15 to 16% back in May 2021. Ideal fat is lessthan 6% so we need to keep working on that. They see a lobular hepatic contour with subtlenodular enhancement without overt morphologic changes of chronic liver disease.Redemonstrated 6 mm segment 2 internal enhancingfocus seen with alcohol and they felt as a perfusion anomaly. Anothernonspecific 6 mm enhancing and diffusion restriction nodule seen in the capsulestable since November 2020. We will plan to do another MRI in 6 months to lookat these. Gallbladder/biliary tree normal.Spleen, pancreas, and adrenals normal.Postsurgical changes seen of the partial leftnephrectomy without evidence of recurrence. 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 This is an anatomic variant. Accessory replaced righthepatic artery seen arising from the superior mesenteric artery as well whichis again another anatomic variant. Some degenerative changes seen of the spine.  November 2021 labs show white cell count 9.5hemoglobin 12.3 platelet count 336 MCV 91 neutrophils 6.3 lymphocytes 2.2.These are all normal range. Glucose slightly up at 106 previously was 82.Maybe not fasting this day but she says it was and so may want to share withlocal provider. BUN of 19 creatinine 1.08 which is slightly up andpreviously was 1.03 but prior also was 1.05 last year.Sodium 136 potassium 4.4 calcium 9.3 albumin 4.5bilirubin 0.3 alkaline phosphatase 67 AST 21 ALT 17. Previously AST 24 and ALT20. Hep C PCR less than 12 and not detected.INR normal at 0.9.Meld low at 7 and meld na 7. She says that she has not gained weight and notexercising due to feet issues due to gout and gout is doing better and she ishaving the issues.  She says on cardizem cd and enalapril 10/25mg forher bp.  November 1 MRI back.Lower thorax shows median sternotomy wires.Liver showed fat deposition and redemonstration ofmild surface nodularity. No fat quant given. There are some perfusion abnormalities that areseen throughout the liver and that requires a 6-month follow-up surveillance ofthis. Gallbladder and biliary tree appeared to be normal.Spleen was normal.Pancreas was normal.Subcentimeter left simple cyst seen in the kidney.Status post left partial nephrectomy noted. No evidence of local recurrenceseen. Lymph nodes normal.Liver vessels normal.Retroperitoneum reported to be normal.No aggressive osseous lesions and postsurgicalchanges seen in the ventral abdominal midline.Overall they saw postsurgical changes of partialleft nephrectomy with no recurrent or metastatic disease seen. The liver isfatty appearing but they did not state by how much fat percentage. Sometimestechnically they are not able to do that measurement.  May 11: hcv pcr less than 12 and so great to see.Ast 24 and alt 20 and alk 63 and tb 0.3 and ca 9.5 and na 140 and k 4.3 and cr1.03 and bun 13. Glu 82. Wbc 9.5 and hg 12.9 and plat 363. Mcv 91. Prior cr1.05 and so little lower now 1.03.  May 11 2021 MRI shows the liver to be fatty at 15to 16%. Desired is less than 6%. Minimal surface nodularity still seen in theliver but without definite findings of cirrhosis.Redemonstrated 6 mm segment to hyperechoic lesionseen which they feel is a perfusion anomaly. We should check on that in 6months. Gallbladder biliary tree were normal. Spleennormal. Pancreas normal. Postsurgical changes seen of partial left nephrectomybut without evidence of recurrence seen. Redemonstrated subcentimeter left renal cyst seen.They mention that you have an anatomic variant ofthe replaced left hepatic artery arising from the left gastric artery. Alsoaccessory or replaced right hepatic artery arising from the superior mesentericartery. Postsurgical changes also seen along the anteriorabdominal wall. Overall they found no evidence of recurrent ormetastatic disease within the abdomen from the previous left partialnephrectomy and the liver was fatty at 15 to 16% fat. Nov 2020 10 and inr 0.9 and hcv less than 12. ast18 and alt 16 so at ideal. Tb 0.2 and alk 66. na 137 and k 4.2 and cl 99 andco2 24 and bun 17 and cr 1.05 slightly up and glu 122 elevated and maybe notfasting and show local md. wbc 8.7 hg 12.1 plat 338. mcv 93. Dr Sanderson: usually sees once a year. Nov 13 2020 na 139 and k 3.5 and cl 103 and bun 18and cr 1.30 and glu 131 and alb 4.2 and tb 0.3 and ast 17 and alt 15 and alk 56and wbc 10.3 and hg 12.6 plat 329.   Document Type: MRI Abdomen w/ + w/o ContrastDocument Date: November 10, 2020 11:36Document Status: Auth (Verified)Document Title: MRI Abdomen w/ + w/o ContrastPerformed By: Juanpablo RezaVerified By: Juanpablo Reza on November 10,2020 13:26 Encounter info: [id number], [facility], Single Visit OP,11/10/2020 - 11/10/2020   * Final Report *IMPRESSION: Prior left partial nephrectomy withoutlocally recurrent or metastatic disease within the abdomen. Signature Line *** Final ***  Electronically Signed By: Juanpablo Reza 11/10/2020 13:26 Dictated by: Juanpablo Reza   Nov 7 2019 glu 98 and cr 1.11 slightly up and na138 and k 4.4 and cl 100 and total protein 8.2 slightly up. alb 4.6 and tb 0.4and alk 61 and ast 18 and alt 17 wbc 9.5 and hg 12.9 and plat 335. HCV pcr neg. mri 2019:  Document Type: MRI Abdomen w/ + w/o ContrastDocument Date: November 06, 2019 10:37Document Status: Auth (Verified)Document Title: MRI Abdomen w/ + w/o ContrastPerformed By: Geoff Farah IVVerified By: Geoff Farah IV on November06, 2019 11:23 Encounter info: [id number], [facility], Single Visit OP,11/6/2019 - 11/6/2019 IMPRESSION:  1. There is marked hepatic steatosis.Morphologically normal liver. No suspicious lesions. No stigmata of portalhypertension. 2. Stable postsurgical defect in the left kidneywith no enhancing lesions.   Signature Line *** Final ***  Electronically Signed By: Geoff Farah IVon 11/06/2019 11:23 Dictated by: Geoff Farah IV  Document Type: XR Chest 2 Views PA + Lat StndProtocol Document Date: November 06, 2019 11:08Document Status: Auth (Verified)Document Title: XR Chest 2 Views PA + Lat StndProtocol Performed By: Kwadwo Hare BVerified By: Kwadwo Hare on November 06,2019 13:26 Encounter info: [id number], [facility], Single Visit OP,11/6/2019 - 11/6/2019 IMPRESSION: Minimal basilar atelectasis.CT chest has increased sensitivity for metastaticdisease.   Signature Line *** Final ***  Electronically Signed By: Kwadwo Hare 11/06/2019 13:26 Dictated by: Kwadwo Hare She had a s/p robotic L partial nephrectomyshowing papillary RCC in April 2018.  She had neurological issues and she saw  and not noted to have brain tumor, noted to have hemorrhorage, shehas vascular abnormality. He referred to Dr Looney. She had a cath and lookedat flow and saw an anomaly. Says also told re radiation. Nothing done and shesays to return prn to see Dr Looney. She has not seen him or anyone for this again andno other issues.  She has not done the covid 19 vaccine and says shedoes not feel needs it.  did get one of them. Prior labs 2019 na 136 and k 4.0 and alb 4.4 andtb 0.4 and alk 51 and ast 14 and alt 12 and wbc 9.0 and hg 12.5 and buxz193.afp 5.3 normal and hcv pcr negative.  March 2019 u/s:  Document Type: US Abdomen CompleteDocument Date: March 12, 2019 09:52Document Status: Auth (Verified)Document Title: US Abdomen CompletePerformed By: Paxton Ray SVerified By: Kalpesh Cisneros on March 12,2019 10:31 Encounter info: [id number], [facility], Single Visit OP,3/12/2019 - 3/12/2019  * Final Report * Reason For Exam HEPATITIS C  REPORT EXAM: US Abdomen Complete, US Abdomen DopplerComplete  CLINICAL INDICATION: HEPATITIS C. elevated AFP;Port HTN; left renal cell carcinoma IMPRESSION: 1. Coarsened hepatic echotexture with increasedechogenicity consistent with chronic liver disease/hepatic steatosis. Nosuspicious liver lesions. Patent hepatic vasculature with normal direction of flow.2. Punctate calcifications within the liver andspleen, likely the sequela of old granulomatous disease. The images were reviewed and interpreted by Colleen Cisneros MD.  Signature Line *** Final ***  Electronically Signed By: Kalpesh Cisneros 03/12/2019 10:31 Dictated by: Paxton Ray S  IR report showed Nov 15 2018 grade 2 medialparietal avm.  9/11/18 labs gluc 91 cr 0.96 alp 56 tb 0.5 ast 16 alt 11 wbc 7 hgb 12.6 plt 304 apf 5 hep c negative  june 2018 u/s: * Final Report * Reason For Exam Hepatitis C, chronic REPORT EXAM: US Abdomen Doppler Complete, US AbdomenComplete  CLINICAL INDICATION: Hepatitis C, chronic. partialL nephrectomy  TECHNIQUE: Grayscale, pulsed wave and colorDoppler sonography of the upper abdomen were performed. COMPARISON: December 12, 2017 FINDINGS: IMPRESSION: 1. Echogenic liver with nodular contour suggestiveof hepatic steatosis/chronic liver disease. 2. Patent hepatic vasculature. Signature Line *** Final ***  Electronically Signed By: Kalpesh Cisneros 06/07/2018 10:40 july 2018 ct c/a/pno acute findings, colonic diverticulosis,atherosclerosis, granulomatous dz noted in liver. post surgical changessuggested involvoing the left kidney  ct chead july 2018acute parenchymal hemorrhage in right parietal lobe 4/16/18 kidney path: KIDNEY, LEFT, MASS, ROBOTIC PARTIAL NEPHRECTOMY:Renal cell carcinoma, papillary type 1, greatestcarcinoma dimension 4.4 cm Carcinoma is limited to the kidneyFuhrman nuclear grade [id number] cell change in approximately 10% tumor, nosarcomatoid change identified Carcinoma at inked parenchymal margin of excision(tumor grossly perforated) Uninvolved renal parenchyma with chronicinterstitial nephritis  LYMPH NODES, PARA-AORTIC, EXCISION:No metastatic carcinoma identified in three lymphnodes (0/3) Focus of endosalpingiosis in one lymph node SOFT TISSUE, UMM-TUMOR FAT, EXCISION:Benign fibroadipose tissueNo carcinoma identified She did colon with dr Mcgee and do in 5 yrs 2021. Seing Dr Shields april 2022 and he said she wasstable.  Plan: 1. U.s of liver pending and do every 6m. 2. Pt will do a telmed in 6m. 3. Pt will do u.s ion 6m.  Duration of visit  45 mins with 10 min of prep and loading to chart and 35 min of face to face visit with time spent explaining pt's condition and treatment plan with the pt.

## 2024-05-20 ENCOUNTER — TELEPHONE ENCOUNTER (OUTPATIENT)
Dept: URBAN - METROPOLITAN AREA CLINIC 86 | Facility: CLINIC | Age: 69
End: 2024-05-20

## 2024-05-25 ENCOUNTER — TELEPHONE ENCOUNTER (OUTPATIENT)
Dept: URBAN - METROPOLITAN AREA CLINIC 86 | Facility: CLINIC | Age: 69
End: 2024-05-25

## 2024-05-25 LAB
A/G RATIO: 1.4
ALBUMIN: 4.4
ALKALINE PHOSPHATASE: 55
ALT (SGPT): 14
AST (SGOT): 15
BILIRUBIN, TOTAL: 0.4
BUN/CREATININE RATIO: 17
BUN: 18
CALCIUM: 9.8
CARBON DIOXIDE, TOTAL: 26
CHLORIDE: 100
CREATININE: 1.06
EGFR: 56
GLOBULIN, TOTAL: 3.2
GLUCOSE: 110
HCV RNA, QUANTITATIVE: <1.18
HCV RNA, QUANTITATIVE: <15
POTASSIUM: 5
PROTEIN, TOTAL: 7.6
SODIUM: 137

## 2024-05-27 ENCOUNTER — LAB OUTSIDE AN ENCOUNTER (OUTPATIENT)
Dept: URBAN - METROPOLITAN AREA CLINIC 86 | Facility: CLINIC | Age: 69
End: 2024-05-27

## 2024-05-30 ENCOUNTER — TELEPHONE ENCOUNTER (OUTPATIENT)
Dept: URBAN - METROPOLITAN AREA CLINIC 86 | Facility: CLINIC | Age: 69
End: 2024-05-30

## 2024-06-06 ENCOUNTER — OFFICE VISIT (OUTPATIENT)
Dept: URBAN - METROPOLITAN AREA TELEHEALTH 2 | Facility: TELEHEALTH | Age: 69
End: 2024-06-06
Payer: MEDICARE

## 2024-06-06 ENCOUNTER — LAB OUTSIDE AN ENCOUNTER (OUTPATIENT)
Dept: URBAN - METROPOLITAN AREA TELEHEALTH 2 | Facility: TELEHEALTH | Age: 69
End: 2024-06-06

## 2024-06-06 VITALS — HEIGHT: 66 IN | BODY MASS INDEX: 33.11 KG/M2 | WEIGHT: 206 LBS

## 2024-06-06 DIAGNOSIS — B18.2 CHRONIC ACTIVE HEPATITIS C: ICD-10-CM

## 2024-06-06 DIAGNOSIS — K74.00 HEPATIC FIBROSIS: ICD-10-CM

## 2024-06-06 DIAGNOSIS — Z85.528 HISTORY OF RENAL CELL CANCER: ICD-10-CM

## 2024-06-06 DIAGNOSIS — K70.0 FATTY LIVER: ICD-10-CM

## 2024-06-06 PROCEDURE — 99214 OFFICE O/P EST MOD 30 MIN: CPT

## 2024-06-06 RX ORDER — MAGNESIUM OXIDE 400 MG/1
1 TABLET AS NEEDED TABLET ORAL ONCE A DAY
Status: ACTIVE | COMMUNITY

## 2024-06-06 RX ORDER — UBIDECARENONE/VIT E ACET 100MG-5
1 CAPSULE CAPSULE ORAL ONCE A DAY
Status: ACTIVE | COMMUNITY

## 2024-06-06 RX ORDER — LEVOTHYROXINE SODIUM 100 UG/1
TAKE 1 TABLET (100 MCG) BY ORAL ROUTE ONCE DAILY TABLET ORAL 1
Qty: 0 | Refills: 0 | Status: ACTIVE | COMMUNITY
Start: 1900-01-01

## 2024-06-06 RX ORDER — ASCORBIC ACID 125 MG
AS DIRECTED TABLET,CHEWABLE ORAL
Status: ACTIVE | COMMUNITY

## 2024-06-06 RX ORDER — LISINOPRIL 10 MG/1
1 TABLET TABLET ORAL ONCE A DAY
Status: ACTIVE | COMMUNITY

## 2024-06-06 RX ORDER — COMPOUNDING SYRUP VEHICLE 1 G/ML
AS DIRECTED SYRUP ORAL
Status: ACTIVE | COMMUNITY

## 2024-06-06 RX ORDER — ESTRADIOL 1 MG/1
TAKE 1 TABLET (1 MG) BY ORAL ROUTE ONCE DAILY TABLET ORAL 1
Qty: 0 | Refills: 0 | Status: ACTIVE | COMMUNITY
Start: 1900-01-01

## 2024-06-06 RX ORDER — HYDROCHLOROTHIAZIDE 12.5 MG/1
0.5 TABLET IN THE MORNING TABLET ORAL ONCE A DAY
Status: ACTIVE | COMMUNITY

## 2024-06-06 RX ORDER — DILTIAZEM HYDROCHLORIDE 240 MG/1
TAKE 1 CAPSULE (240 MG) BY ORAL ROUTE ONCE DAILY CAPSULE, COATED, EXTENDED RELEASE ORAL 1
Qty: 0 | Refills: 0 | Status: ACTIVE | COMMUNITY
Start: 1900-01-01

## 2024-06-06 NOTE — EXAM-PHYSICAL EXAM
Gen: no distress.   Eyes: no jaundice.   Mouth: no thrush.   CV: no chest pain.   Resp: no wheezes.   Abd: no pain.   Ext: no edema.               Neuro: no weakness.

## 2024-06-06 NOTE — HPI-TODAY'S VISIT:
Pt is 69 year oldCaucasian/White female, after a previous visit Dec 2023 for an evaluationfor hepatitis C and on treatment evaluation of her newly diagnosed cirrhosis bypilar.  A copy of the note john be sent to the referring provider.  May 21 u/s: liver 18cm with marginal hepatomegaly and diffuse increase in hepatic echogenicity consistent with fatty liver.  Common bile duct 4mm in diameter which is normal.  Gallbladder normal.  Spleen normal size.  No ascites.  Portal vein normal size 1.1cm and normal directional flow.  Vessels patent.  Overall marginal hepatomegaly with hepatic fibrosis and fatty infiltration of the liver and normal doppler.  Prior May 2023 u.s with liver increased echogenicity and spleen 12.2cm.  She says has a treadmill and needs to look at walking a 30 min.  May 21 labs as expected showed hep C PCR was negative at less than 15. Always great to see that.  Glucose was 110 elevated and possibly you were not fasting that day? BUN of 18 creatinine slightly up at 1.06 and many people lately are having trouble keeping up with her hydration with the increasing heat. You may want to work on that hydration status as these labs would suggest that you are running a little dry. Sodium 137 potassium 5.0 calcium 9.8 albumin 4.4 bilirubin 0.4 alk phos 55 AST 15 ALT of 14 so liver labs remain ideal.   November 28 labs show WBC normal at 9.1 [id number] platelet count 339 and MCV 89.4. Neutrophils and lymphocytes normal.Glucose slightly up at 102 and unclear if you were fasting or not? Please share with primary provider.BUN of 14 and creatinine normal at 0.95 down from1.14 in August. Sodium 139 potassium 4.5 calcium 9.5 albumin 4.5  globin 0.4 alk phos 61 and AST of 16 ALT of 14.Hep C PCR remains less than 15 and not mentioned as being detected.   Addendum: nov 28: liver enlarged and measured 17.4cm.  Fattyy infiltrattion. No focal mass. No duct dilaiton, CBD 5mm. No stones. Right kidney 12.8cm. Left kidney 10.8cm. Cyst left kidney in 19mm.  Spleen 10.9cm.  Hepatomegaly seen and so needs to work on weight and diet and exercise as can.  August 11 labs show hep C PCR remains less than 15and not mention has been detected. Glucose was 93, BUN is 17 but your creatinine wasup to 1.14 from 0.89 and that could be related due to the heat wave that we arefacing. You may want to increase your hydration and repeat that with yourprimary provider. Sodium 138 potassium 4.4 albumin 4.2 bilirubin 0.4alk phos 55 AST 15 ALT 13 so the liver labs remain stable. Your hep B testwhich you were concerned about was noted to be negative with the B surfaceantigen being clearly not active.   May 22 labs show hep C PCR less than 15 and notmentioned as been detected which is good to note. INR normal at 1.0 glucose 94BUN of 14 creatinine 0.89 sodium 139 potassium 4.6 calcium 9.1 albumin 4.3bilirubin 0.3 alk phos 57 AST is 16 and ALT of 13 with ideal ALT less than 25.WBC 8.8 hemoglobin 12 with hematocrit slightly lowat 34.5 and was previously 36.1. MCV 88.5 normal and platelet count 311.Neutrophils and lymphocytes both normal. Unclear why hematocrit is slightly lower this timeas this time you had the same exact hemoglobin of 12 as in November 2022 withthe hematocrit time being 36.1 then. Could be lab variation. Normal is from 35up to 45% so todays was only slightly off.  She [facility] not [facility]. Yusra is calling for this.   May 22 2023 ultrasound shows liver to have increased echogenicity, with scattered punctate echogenic foci that are characterized ascalcified granulomas on your prior CT. No dilated bile ducts were seen and the commonbile duct was normal at 4 mm. Gallbladder also appeared normal and Cornelius signwas negative. Pancreas was normal were seen.Right kidney was 12.5 cm with no hydronephrosisleft kidney was 9 cm with redemonstrated cyst at the left upper pole measuring1.4 x 1.9 x 1.4 cm. Spleen measures 12.2 cm.Liver vessels were not fully seen as the hepaticand portal veins were normal but the main hepatic artery was obscured but theydid see the left hepatic artery. In summary, they felt the liver appeared to havesome fatty changes. As you recall, your November MRI showed us thatthe liver was still fatty at 15.3% and the partial left nephrectomy changeswere better seen on the MRI with them also mentioning the left renal cyst aswell. Please share with primary providers and we willdiscuss more at the next visit.  She says has not seen Dr Sanderson recently. November 16 labs show glucose slightly up at 100and was previously 88 in May but last year November was slightly up at 106.Please share with primary provider. BUN of 14 creatinine 0.92 down from 1.0 and prior1.08 so good to see that trend Down. Sodium 141 potassium 4.7 albumin 4.7 bilirubin 0.3alkaline phosphatase 79 AST 17 ALT 19 with ideal ALT less than 25.White blood cell count 8.8 hemoglobin 12 plateletcount 332 MCV 93. INR normal at 0.9. Hep C PCR remains less than 12 but not mentionedas being detected. Meld 6 and meld na 6 so remains low. November 14 MRI showed clear lung bases.No suspicious liver lesions with liver fatactually elevated at 15.3% which would be in the grade 1 liver fat range of 6.5up to 17.4%. Unremarkable gallbladder seen and no bile ductdilation. No splenomegaly was seen.No main pancreas duct dilation seen.No adrenal nodule seen.Prior partial left nephrectomy changes seen. Noevidence of local recurrence. Left renal cyst was seen and no hydronephrosiswas seen. No suspicious lymph nodes were seen.Overall postsurgical changes were seen in the anterior abdominal wall. Liver fat was seen at 15.3% which is considered inthe mild range desired is less than 6%. You need to work on that.Good to see that there was no local recurrence ormetastatic disease seen in the abdomen.   May 23: afp normal 3.0.Wbc 8.7 and hg 12.2 and hct 37.4 and mcv 93 andplat 348.neutrophils 5.9 and lymphs 1.9. Glu 88 and bun 13 and cr 1.00 and na 137 and k 4.6and cl 99 and co2 19 and ca 9.4 and alb 4.5 and tb 0.2 and alk 66 and ast 16and alt 14. ( ast 21 and alt 17 prior and ideal alt is less than 25 so doingwell.) HCV pcr negative.Inr 0.9. Meld 6 and meld na 6. May 23 MRI released to me.Lower thorax was normal.Liver remains fatty with the liver being at 13.4%but it is slightly lower than 15 to 16% back in May 2021. Ideal fat is lessthan 6% so we need to keep working on that. They see a lobular hepatic contour with subtlenodular enhancement without overt morphologic changes of chronic liver disease.Redemonstrated 6 mm segment 2 internal enhancingfocus seen with alcohol and they felt as a perfusion anomaly. Anothernonspecific 6 mm enhancing and diffusion restriction nodule seen in the capsulestable since November 2020. We will plan to do another MRI in 6 months to lookat these. Gallbladder/biliary tree normal.Spleen, pancreas, and adrenals normal.Postsurgical changes seen of the partial leftnephrectomy without evidence of recurrence. 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 This is an anatomic variant. Accessory replaced righthepatic artery seen arising from the superior mesenteric artery as well whichis again another anatomic variant. Some degenerative changes seen of the spine.  November 2021 labs show white cell count 9.5hemoglobin 12.3 platelet count 336 MCV 91 neutrophils 6.3 lymphocytes 2.2.These are all normal range. Glucose slightly up at 106 previously was 82.Maybe not fasting this day but she says it was and so may want to share withlocal provider. BUN of 19 creatinine 1.08 which is slightly up andpreviously was 1.03 but prior also was 1.05 last year.Sodium 136 potassium 4.4 calcium 9.3 albumin 4.5bilirubin 0.3 alkaline phosphatase 67 AST 21 ALT 17. Previously AST 24 and ALT20. Hep C PCR less than 12 and not detected.INR normal at 0.9.Meld low at 7 and meld na 7. She says that she has not gained weight and notexercising due to feet issues due to gout and gout is doing better and she ishaving the issues.  She says on cardizem cd and enalapril 10/25mg forher bp.  November 1 MRI back.Lower thorax shows median sternotomy wires.Liver showed fat deposition and redemonstration ofmild surface nodularity. No fat quant given. There are some perfusion abnormalities that areseen throughout the liver and that requires a 6-month follow-up surveillance ofthis. Gallbladder and biliary tree appeared to be normal.Spleen was normal.Pancreas was normal.Subcentimeter left simple cyst seen in the kidney.Status post left partial nephrectomy noted. No evidence of local recurrenceseen. Lymph nodes normal.Liver vessels normal.Retroperitoneum reported to be normal.No aggressive osseous lesions and postsurgicalchanges seen in the ventral abdominal midline.Overall they saw postsurgical changes of partialleft nephrectomy with no recurrent or metastatic disease seen. The liver isfatty appearing but they did not state by how much fat percentage. Sometimestechnically they are not able to do that measurement.  May 11: hcv pcr less than 12 and so great to see.Ast 24 and alt 20 and alk 63 and tb 0.3 and ca 9.5 and na 140 and k 4.3 and cr1.03 and bun 13. Glu 82. Wbc 9.5 and hg 12.9 and plat 363. Mcv 91. Prior cr1.05 and so little lower now 1.03.  May 11 2021 MRI shows the liver to be fatty at 15to 16%. Desired is less than 6%. Minimal surface nodularity still seen in theliver but without definite findings of cirrhosis.Redemonstrated 6 mm segment to hyperechoic lesionseen which they feel is a perfusion anomaly. We should check on that in 6months. Gallbladder biliary tree were normal. Spleennormal. Pancreas normal. Postsurgical changes seen of partial left nephrectomybut without evidence of recurrence seen. Redemonstrated subcentimeter left renal cyst seen.They mention that you have an anatomic variant ofthe replaced left hepatic artery arising from the left gastric artery. Alsoaccessory or replaced right hepatic artery arising from the superior mesentericartery. Postsurgical changes also seen along the anteriorabdominal wall. Overall they found no evidence of recurrent ormetastatic disease within the abdomen from the previous left partialnephrectomy and the liver was fatty at 15 to 16% fat. Nov 2020 10 and inr 0.9 and hcv less than 12. ast18 and alt 16 so at ideal. Tb 0.2 and alk 66. na 137 and k 4.2 and cl 99 andco2 24 and bun 17 and cr 1.05 slightly up and glu 122 elevated and maybe notfasting and show local md. wbc 8.7 hg 12.1 plat 338. mcv 93. Dr Sanderson: usually sees once a year. Nov 13 2020 na 139 and k 3.5 and cl 103 and bun 18and cr 1.30 and glu 131 and alb 4.2 and tb 0.3 and ast 17 and alt 15 and alk 56and wbc 10.3 and hg 12.6 plat 329.   Document Type: MRI Abdomen w/ + w/o ContrastDocument Date: November 10, 2020 11:36Document Status: Auth (Verified)Document Title: MRI Abdomen w/ + w/o ContrastPerformed By: Juanpablo RezaVerified By: Juanpablo Reza on November 10,2020 13:26 Encounter info: [id number], [facility], Single Visit OP,11/10/2020 - 11/10/2020   * Final Report *IMPRESSION: Prior left partial nephrectomy withoutlocally recurrent or metastatic disease within the abdomen. Signature Line *** Final ***  Electronically Signed By: Juanpablo Reza 11/10/2020 13:26 Dictated by: Juanpablo Reza   Nov 7 2019 glu 98 and cr 1.11 slightly up and na138 and k 4.4 and cl 100 and total protein 8.2 slightly up. alb 4.6 and tb 0.4and alk 61 and ast 18 and alt 17 wbc 9.5 and hg 12.9 and plat 335. HCV pcr neg. mri 2019:  Document Type: MRI Abdomen w/ + w/o ContrastDocument Date: November 06, 2019 10:37Document Status: Auth (Verified)Document Title: MRI Abdomen w/ + w/o ContrastPerformed By: Geoff Farah IVVerified By: Geoff Farah IV on November06, 2019 11:23 Encounter info: [id number], [facility], Single Visit OP,11/6/2019 - 11/6/2019 IMPRESSION:  1. There is marked hepatic steatosis.Morphologically normal liver. No suspicious lesions. No stigmata of portalhypertension. 2. Stable postsurgical defect in the left kidneywith no enhancing lesions.   Signature Line *** Final ***  Electronically Signed By: Geoff Farah IVon 11/06/2019 11:23 Dictated by: Geoff Farah IV  Document Type: XR Chest 2 Views PA + Lat StndProtocol Document Date: November 06, 2019 11:08Document Status: Auth (Verified)Document Title: XR Chest 2 Views PA + Lat StndProtocol Performed By: Kwadwo Hare BVerified By: Kwadwo Hare on November 06,2019 13:26 Encounter info: [id number], [facility], Single Visit OP,11/6/2019 - 11/6/2019 IMPRESSION: Minimal basilar atelectasis.CT chest has increased sensitivity for metastaticdisease.   Signature Line *** Final ***  Electronically Signed By: Kwadwo Hare 11/06/2019 13:26 Dictated by: Kwadwo Hare She had a s/p robotic L partial nephrectomyshowing papillary RCC in April 2018.  She had neurological issues and she saw  and not noted to have brain tumor, noted to have hemorrhorage, shehas vascular abnormality. He referred to Dr Looney. She had a cath and lookedat flow and saw an anomaly. Says also told re radiation. Nothing done and shesays to return prn to see Dr Looney. She has not seen him or anyone for this again andno other issues.  She has not done the covid 19 vaccine and says shedoes not feel needs it.  did get one of them. Prior labs 2019 na 136 and k 4.0 and alb 4.4 andtb 0.4 and alk 51 and ast 14 and alt 12 and wbc 9.0 and hg 12.5 and [id number] 5.3 normal and hcv pcr negative.  March 2019 u/s:  Document Type: US Abdomen CompleteDocument Date: March 12, 2019 09:52Document Status: Auth (Verified)Document Title: US Abdomen CompletePerformed By: Paxton Ray SVerified By: Kalpesh Cisneros on March 12,2019 10:31 Encounter info: [id number], [facility], Single Visit OP,3/12/2019 - 3/12/2019  * Final Report * Reason For Exam HEPATITIS C  REPORT EXAM: US Abdomen Complete, US Abdomen DopplerComplete  CLINICAL INDICATION: HEPATITIS C. elevated AFP;Port HTN; left renal cell carcinoma IMPRESSION: 1. Coarsened hepatic echotexture with increasedechogenicity consistent with chronic liver disease/hepatic steatosis. Nosuspicious liver lesions. Patent hepatic vasculature with normal direction of flow.2. Punctate calcifications within the liver andspleen, likely the sequela of old granulomatous disease. The images were reviewed and interpreted by Colleen Cisneros MD.  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  9/11/18 labs gluc 91 cr 0.96 alp 56 tb 0.5 ast 16 alt 11 wbc 7 hgb 12.6 plt 304 apf 5 hep c negative  june 2018 u/s: * Final Report * Reason For Exam Hepatitis C, chronic REPORT EXAM: US Abdomen Doppler Complete, US AbdomenComplete  CLINICAL INDICATION: Hepatitis C, chronic. partialL nephrectomy  TECHNIQUE: Grayscale, pulsed wave and colorDoppler sonography of the upper abdomen were performed. COMPARISON: December 12, 2017 FINDINGS: IMPRESSION: 1. Echogenic liver with nodular contour suggestiveof hepatic steatosis/chronic liver disease. 2. Patent hepatic vasculature. Signature Line *** Final ***  Electronically Signed By: Kalpesh Cisneros 06/07/2018 10:40 july 2018 ct c/a/pno acute findings, colonic diverticulosis,atherosclerosis, granulomatous dz noted in liver. post surgical changessuggested involvoing the left kidney  ct chead july 2018acute parenchymal hemorrhage in right parietal lobe 4/16/18 kidney path: KIDNEY, LEFT, MASS, ROBOTIC PARTIAL NEPHRECTOMY:Renal cell carcinoma, papillary type 1, greatestcarcinoma dimension 4.4 cm Carcinoma is limited to the kidneyFuhrman nuclear grade [id number] cell change in approximately 10% tumor, nosarcomatoid change identified Carcinoma at inked parenchymal margin of excision(tumor grossly perforated) Uninvolved renal parenchyma with chronicinterstitial nephritis  LYMPH NODES, PARA-AORTIC, EXCISION:No metastatic carcinoma identified in three lymphnodes (0/3) Focus of endosalpingiosis in one lymph node SOFT TISSUE, UMM-TUMOR FAT, EXCISION:Benign fibroadipose tissueNo carcinoma identified She did colon with dr Mcgee and do in 5 yrs 2021. Seing Dr Shields april 2022 and he said she wasstable.  Plan: 1. U.s of liver and labs redo in 6m. 2. Stressed need to exercise. 3. Pt will do telemed in 6m.   Duration of visit 31 mins with 10 min of prep and loading to chart and 21 min from 128 to 149 pm by clock as healow clock off for this healow telemed visit with time spent explaining pt's condition and treatment plan with the pt.

## 2024-06-12 ENCOUNTER — APPOINTMENT (RX ONLY)
Dept: URBAN - METROPOLITAN AREA OTHER 10 | Facility: OTHER | Age: 69
Setting detail: DERMATOLOGY
End: 2024-06-12

## 2024-06-12 DIAGNOSIS — L29.8 OTHER PRURITUS: ICD-10-CM

## 2024-06-12 DIAGNOSIS — L29.89 OTHER PRURITUS: ICD-10-CM

## 2024-06-12 DIAGNOSIS — D18.0 HEMANGIOMA: ICD-10-CM

## 2024-06-12 DIAGNOSIS — L81.4 OTHER MELANIN HYPERPIGMENTATION: ICD-10-CM

## 2024-06-12 DIAGNOSIS — D22 MELANOCYTIC NEVI: ICD-10-CM

## 2024-06-12 DIAGNOSIS — L82.1 OTHER SEBORRHEIC KERATOSIS: ICD-10-CM

## 2024-06-12 PROBLEM — D48.5 NEOPLASM OF UNCERTAIN BEHAVIOR OF SKIN: Status: ACTIVE | Noted: 2024-06-12

## 2024-06-12 PROBLEM — D22.72 MELANOCYTIC NEVI OF LEFT LOWER LIMB, INCLUDING HIP: Status: ACTIVE | Noted: 2024-06-12

## 2024-06-12 PROBLEM — D22.71 MELANOCYTIC NEVI OF RIGHT LOWER LIMB, INCLUDING HIP: Status: ACTIVE | Noted: 2024-06-12

## 2024-06-12 PROBLEM — D18.01 HEMANGIOMA OF SKIN AND SUBCUTANEOUS TISSUE: Status: ACTIVE | Noted: 2024-06-12

## 2024-06-12 PROBLEM — D22.61 MELANOCYTIC NEVI OF RIGHT UPPER LIMB, INCLUDING SHOULDER: Status: ACTIVE | Noted: 2024-06-12

## 2024-06-12 PROBLEM — D22.62 MELANOCYTIC NEVI OF LEFT UPPER LIMB, INCLUDING SHOULDER: Status: ACTIVE | Noted: 2024-06-12

## 2024-06-12 PROBLEM — D22.5 MELANOCYTIC NEVI OF TRUNK: Status: ACTIVE | Noted: 2024-06-12

## 2024-06-12 PROCEDURE — ? OTHER

## 2024-06-12 PROCEDURE — ? BIOPSY BY SHAVE METHOD

## 2024-06-12 PROCEDURE — ? PRESCRIPTION MEDICATION MANAGEMENT

## 2024-06-12 PROCEDURE — ? SUNSCREEN RECOMMENDATIONS

## 2024-06-12 PROCEDURE — 99213 OFFICE O/P EST LOW 20 MIN: CPT | Mod: 25

## 2024-06-12 PROCEDURE — ? COUNSELING

## 2024-06-12 PROCEDURE — 11102 TANGNTL BX SKIN SINGLE LES: CPT

## 2024-06-12 ASSESSMENT — LOCATION DETAILED DESCRIPTION DERM
LOCATION DETAILED: RIGHT DISTAL POSTERIOR THIGH
LOCATION DETAILED: LEFT PROXIMAL DORSAL FOREARM
LOCATION DETAILED: INFERIOR THORACIC SPINE
LOCATION DETAILED: RIGHT ANTECUBITAL SKIN
LOCATION DETAILED: LEFT DISTAL POSTERIOR THIGH
LOCATION DETAILED: RIGHT INFERIOR LATERAL MIDBACK
LOCATION DETAILED: RIGHT SUPERIOR MEDIAL MIDBACK
LOCATION DETAILED: LEFT KNEE
LOCATION DETAILED: EPIGASTRIC SKIN
LOCATION DETAILED: POSTERIOR MID-PARIETAL SCALP
LOCATION DETAILED: RIGHT PROXIMAL PRETIBIAL REGION
LOCATION DETAILED: LEFT VENTRAL PROXIMAL FOREARM
LOCATION DETAILED: LEFT SUPERIOR MEDIAL UPPER BACK
LOCATION DETAILED: RIGHT PROXIMAL DORSAL FOREARM
LOCATION DETAILED: RIGHT VENTRAL DISTAL FOREARM
LOCATION DETAILED: LEFT ANTERIOR DISTAL THIGH
LOCATION DETAILED: LEFT VENTRAL DISTAL FOREARM
LOCATION DETAILED: RIGHT ANTERIOR DISTAL THIGH

## 2024-06-12 ASSESSMENT — LOCATION ZONE DERM
LOCATION ZONE: ARM
LOCATION ZONE: TRUNK
LOCATION ZONE: SCALP
LOCATION ZONE: LEG

## 2024-06-12 ASSESSMENT — LOCATION SIMPLE DESCRIPTION DERM
LOCATION SIMPLE: LEFT FOREARM
LOCATION SIMPLE: LEFT POSTERIOR THIGH
LOCATION SIMPLE: ABDOMEN
LOCATION SIMPLE: LEFT THIGH
LOCATION SIMPLE: LEFT KNEE
LOCATION SIMPLE: RIGHT THIGH
LOCATION SIMPLE: RIGHT PRETIBIAL REGION
LOCATION SIMPLE: LEFT UPPER BACK
LOCATION SIMPLE: RIGHT UPPER ARM
LOCATION SIMPLE: RIGHT FOREARM
LOCATION SIMPLE: UPPER BACK
LOCATION SIMPLE: RIGHT LOWER BACK
LOCATION SIMPLE: POSTERIOR SCALP
LOCATION SIMPLE: RIGHT POSTERIOR THIGH

## 2024-06-12 NOTE — PROCEDURE: PRESCRIPTION MEDICATION MANAGEMENT
Render In Strict Bullet Format?: No
Plan: Discussed Gabapentin vs OTC anti-histamines.
Detail Level: Zone

## 2024-06-12 NOTE — PROCEDURE: BIOPSY BY SHAVE METHOD
Detail Level: Detailed
Depth Of Biopsy: dermis
Was A Bandage Applied: Yes
Size Of Lesion In Cm: 0
Biopsy Type: H and E
Biopsy Method: Dermablade
Anesthesia Type: 1% lidocaine with 1:200,000 epinephrine
Anesthesia Volume In Cc: 0.3
Hemostasis: Aluminum Chloride and Electrocautery
Wound Care: No ointment
Dressing: bandage
Destruction After The Procedure: No
Type Of Destruction Used: Curettage
Curettage Text: The wound bed was treated with curettage after the biopsy was performed.
Cryotherapy Text: The wound bed was treated with cryotherapy after the biopsy was performed.
Electrodesiccation Text: The wound bed was treated with electrodesiccation after the biopsy was performed.
Electrodesiccation And Curettage Text: The wound bed was treated with electrodesiccation and curettage after the biopsy was performed.
Silver Nitrate Text: The wound bed was treated with silver nitrate after the biopsy was performed.
Lab: [id number]
Lab Facility: [id number]
Consent: Written consent was obtained and risks were reviewed including but not limited to scarring, infection, bleeding, scabbing, incomplete removal, nerve damage and allergy to anesthesia.
Post-Care Instructions: I reviewed with the patient in detail post-care instructions. Patient is to keep the biopsy site dry overnight, and then apply bacitracin twice daily until healed. Patient may apply hydrogen peroxide soaks to remove any crusting.
Notification Instructions: Patient will be notified of biopsy results. However, patient instructed to call the office if not contacted within 2 weeks.
Billing Type: Third-Party Bill
Information: Selecting Yes will display possible errors in your note based on the variables you have selected. This validation is only offered as a suggestion for you. PLEASE NOTE THAT THE VALIDATION TEXT WILL BE REMOVED WHEN YOU FINALIZE YOUR NOTE. IF YOU WANT TO FAX A PRELIMINARY NOTE YOU WILL NEED TO TOGGLE THIS TO 'NO' IF YOU DO NOT WANT IT IN YOUR FAXED NOTE.

## 2024-10-24 ENCOUNTER — TELEPHONE ENCOUNTER (OUTPATIENT)
Dept: URBAN - METROPOLITAN AREA CLINIC 91 | Facility: CLINIC | Age: 69
End: 2024-10-24

## 2024-10-24 NOTE — HPI-TODAY'S VISIT:
She had an allergic reaction to a ct scan and had to be admitted for allergic reaction for like 4 days. treated steroids. allopurinol she in 2022 had an itch. Lisinopril caused angioedema and so had that rxn also.   She has to resend the order to [facility] for the u.s and labs to do in dec.   Yusra please help her to set this up.

## 2024-12-02 ENCOUNTER — LAB OUTSIDE AN ENCOUNTER (OUTPATIENT)
Dept: URBAN - METROPOLITAN AREA TELEHEALTH 2 | Facility: TELEHEALTH | Age: 69
End: 2024-12-02

## 2024-12-02 ENCOUNTER — OFFICE VISIT (OUTPATIENT)
Dept: URBAN - METROPOLITAN AREA TELEHEALTH 2 | Facility: TELEHEALTH | Age: 69
End: 2024-12-02

## 2024-12-05 ENCOUNTER — TELEPHONE ENCOUNTER (OUTPATIENT)
Dept: URBAN - METROPOLITAN AREA CLINIC 86 | Facility: CLINIC | Age: 69
End: 2024-12-05

## 2024-12-05 NOTE — HPI-TODAY'S VISIT:
Dear Mary White,  Dec 4 labs:  Sodium 137 potassium 4.4 chloride 101 CO2 25 calcium 9.5 BUN of 17 creatinine 1.05 andtotal protein 8.1 albumin 4.6 alk phos 61 AST 20 ALT 13 bilirubin 0.4 WBC 9.4 hemoglobin 13.5 hematocrit slightly up at 41.7 platelet count 311 normal neutrophils slightly up at 5.19 lymphocytes normal at 2.42.  Basophil slightly up at 0.08 and eosinophils 0.29.  Hep C PCR was not detected.  Good to see the labs are stable and please share these slight variances as listed above with your primary provider.  Dr Lomeli

## 2024-12-05 NOTE — HPI-TODAY'S VISIT:
Dear Mary White, December 4 ultrasound showed liver to have increased echogenicity and nodular contour of the liver which limits exam. No dilated intrahepatic bile ducts seen in common bile duct was 3 mm. Gallbladder was normal and Cornelius sign negative. Pancreas was normal. Right kidney 12.2 cm with no hydronephrosis and left kidney 10.6 cm with no hydronephrosis.  Eventually had a left renal mildly complicated cystic lesion that was seen. Spleen was 11.4 cm with a punctate echogenic focus that they thought could be a tiny granuloma Aorta normal caliber and IVC was normal proximally but not seen distally. Liver vessels were patent.   Overall, they felt you had chronic liver disease/fatty liver changes and they recommended MRI for future imaging.   As you know your last MRI was back in November 2022 and at that timeframe your liver fat was elevated at 15.3% which is in the grade 1 mild category and your liver had no suspicious liver lesions. No mention of significant fibrosis seen. Fatty liver could be causing u/s to look more nodular?  You had no splenomegaly. You also had post left kidney partial nephrectomy changes seen with no evidence of local recurrence and you did have though a left renal cyst.   We can talk at the next visit about possibly doing an MRI and if you are concerned about contrast we could potentially do it without contrast but I do think that we need to talk about mri now at that timeframe. Dr. Lomeli

## 2024-12-20 ENCOUNTER — OFFICE VISIT (OUTPATIENT)
Dept: URBAN - METROPOLITAN AREA TELEHEALTH 2 | Facility: TELEHEALTH | Age: 69
End: 2024-12-20
Payer: MEDICARE

## 2024-12-20 VITALS — HEIGHT: 66 IN | WEIGHT: 206 LBS | BODY MASS INDEX: 33.11 KG/M2

## 2024-12-20 DIAGNOSIS — M10.09 GOUT, ARTHRITIS: ICD-10-CM

## 2024-12-20 DIAGNOSIS — K25.9 GASTRIC ULCER: ICD-10-CM

## 2024-12-20 DIAGNOSIS — Z85.528 HISTORY OF RENAL CELL CANCER: ICD-10-CM

## 2024-12-20 DIAGNOSIS — B18.2 CHRONIC ACTIVE HEPATITIS C: ICD-10-CM

## 2024-12-20 DIAGNOSIS — K70.0 FATTY LIVER: ICD-10-CM

## 2024-12-20 DIAGNOSIS — Z79.899 HIGH RISK MEDICATION USE: ICD-10-CM

## 2024-12-20 DIAGNOSIS — I10 HYPERTENSION: ICD-10-CM

## 2024-12-20 DIAGNOSIS — R74.8 ABNORMAL LIVER ENZYMES: ICD-10-CM

## 2024-12-20 DIAGNOSIS — I63.9 CVA (CEREBRAL VASCULAR ACCIDENT): ICD-10-CM

## 2024-12-20 DIAGNOSIS — C18.9 COLON CANCER: ICD-10-CM

## 2024-12-20 DIAGNOSIS — G62.9 NEUROPATHY: ICD-10-CM

## 2024-12-20 DIAGNOSIS — K76.9 LIVER LESION: ICD-10-CM

## 2024-12-20 DIAGNOSIS — E66.812 CLASS 2 OBESITY: ICD-10-CM

## 2024-12-20 DIAGNOSIS — K74.00 HEPATIC FIBROSIS: ICD-10-CM

## 2024-12-20 DIAGNOSIS — B96.81 HELICOBACTER PYLORI (H. PYLORI) INFECTION: ICD-10-CM

## 2024-12-20 DIAGNOSIS — K44.9 HIATAL HERNIA: ICD-10-CM

## 2024-12-20 DIAGNOSIS — E66.9 OBESITY: ICD-10-CM

## 2024-12-20 DIAGNOSIS — Z85.038 HISTORY OF COLON CANCER: ICD-10-CM

## 2024-12-20 PROCEDURE — 99214 OFFICE O/P EST MOD 30 MIN: CPT

## 2024-12-20 RX ORDER — LEVOTHYROXINE SODIUM 100 UG/1
TAKE 1 TABLET (100 MCG) BY ORAL ROUTE ONCE DAILY TABLET ORAL 1
Qty: 0 | Refills: 0 | Status: ACTIVE | COMMUNITY
Start: 1900-01-01

## 2024-12-20 RX ORDER — LISINOPRIL 10 MG/1
1 TABLET TABLET ORAL ONCE A DAY
Status: DISCONTINUED | COMMUNITY

## 2024-12-20 RX ORDER — ESTRADIOL 1 MG/1
TAKE 1 TABLET (1 MG) BY ORAL ROUTE ONCE DAILY TABLET ORAL 1
Qty: 0 | Refills: 0 | Status: ACTIVE | COMMUNITY
Start: 1900-01-01

## 2024-12-20 RX ORDER — COMPOUNDING SYRUP VEHICLE 1 G/ML
AS DIRECTED SYRUP ORAL
Status: ON HOLD | COMMUNITY

## 2024-12-20 RX ORDER — MAGNESIUM OXIDE 400 MG/1
1 TABLET AS NEEDED TABLET ORAL ONCE A DAY
Status: ACTIVE | COMMUNITY

## 2024-12-20 RX ORDER — HYDROCHLOROTHIAZIDE 12.5 MG/1
0.5 TABLET IN THE MORNING TABLET ORAL ONCE A DAY
Status: ACTIVE | COMMUNITY

## 2024-12-20 RX ORDER — UBIDECARENONE/VIT E ACET 100MG-5
1 CAPSULE CAPSULE ORAL ONCE A DAY
Status: ACTIVE | COMMUNITY

## 2024-12-20 RX ORDER — ASCORBIC ACID 125 MG
AS DIRECTED TABLET,CHEWABLE ORAL
Status: ACTIVE | COMMUNITY

## 2024-12-20 RX ORDER — DILTIAZEM HYDROCHLORIDE 240 MG/1
TAKE 1 CAPSULE (240 MG) BY ORAL ROUTE ONCE DAILY CAPSULE, COATED, EXTENDED RELEASE ORAL 1
Qty: 0 | Refills: 0 | Status: ACTIVE | COMMUNITY
Start: 1900-01-01

## 2025-05-12 ENCOUNTER — TELEPHONE ENCOUNTER (OUTPATIENT)
Dept: URBAN - METROPOLITAN AREA CLINIC 86 | Facility: CLINIC | Age: 70
End: 2025-05-12

## 2025-05-14 ENCOUNTER — TELEPHONE ENCOUNTER (OUTPATIENT)
Dept: URBAN - METROPOLITAN AREA CLINIC 86 | Facility: CLINIC | Age: 70
End: 2025-05-14

## 2025-05-14 LAB
ABSOLUTE BASOPHILS: 78
ABSOLUTE EOSINOPHILS: 270
ABSOLUTE LYMPHOCYTES: 2279
ABSOLUTE MONOCYTES: 513
ABSOLUTE NEUTROPHILS: 5559
ALBUMIN/GLOBULIN RATIO: 1.3
ALBUMIN: 4.4
ALKALINE PHOSPHATASE: 60
ALT: 16
AST: 20
BASOPHILS: 0.9
BILIRUBIN, DIRECT: 0.1
BILIRUBIN, INDIRECT: 0.3
BILIRUBIN, TOTAL: 0.4
BUN/CREATININE RATIO: (no result)
CALCIUM: 9
CARBON DIOXIDE: 25
CHLORIDE: 103
CREATININE: 0.81
EGFR: 77
EOSINOPHILS: 3.1
FIB 4 INDEX: 1.2
FIB 4 INTERPRETATION: (no result)
GLOBULIN: 3.4
GLUCOSE: 108
HCV RNA, QUANTITATIVE: (no result)
HCV RNA, QUANTITATIVE: (no result)
HEMATOCRIT: 37.1
HEMOGLOBIN: 12.5
INR: 1
LYMPHOCYTES: 26.2
MCH: 30.6
MCHC: 33.7
MCV: 90.9
MONOCYTES: 5.9
MPV: 10.3
NEUTROPHILS: 63.9
PLATELET COUNT: 300
PLATELET COUNT: 300
POTASSIUM: 4.2
PROTEIN, TOTAL: 7.8
PT: 10.5
RDW: 13.2
RED BLOOD CELL COUNT: 4.08
SODIUM: 138
UREA NITROGEN (BUN): 12
WHITE BLOOD CELL COUNT: 8.7

## 2025-05-14 NOTE — HPI-TODAY'S VISIT:
Dear Mary White,    May 12 glucose 108 elevated and maybe not fasting that day?   BUN of 12 creatinine 0.81 sodium 138 potassium 4.2 calcium 9.0 these others were normal except for the glucose which was again slightly up.  This   The hep C PCR remains less than 15 and not detected.   WBC 8.7 hemoglobin 12.5 MCV 90.9 platelet count 300 and neutrophils and lymphocytes normal and good to see.   INR normal at 1.0.   Your fib 4 index was low risk category at 1.2 with values less than 1.3 felt to be low risk to have advanced fibrosis.  Total protein 7.8 albumin 4.4 bilirubin 0.4 and direct 0.1 alk phos 60 AST 20 and ALT 63 with ideal ALT less than 25.   Will correlate this with your imaging when back. Thank you.   Dr. Lomeli

## 2025-05-15 ENCOUNTER — TELEPHONE ENCOUNTER (OUTPATIENT)
Dept: URBAN - METROPOLITAN AREA CLINIC 86 | Facility: CLINIC | Age: 70
End: 2025-05-15

## 2025-05-15 NOTE — HPI-TODAY'S VISIT:
Dear Mary White,    May 12 MRI released to us today.   Lower thorax showed median sternotomy changes.   Liver had moderate to severe steatosis/fatty liver changes and a mildly nodular hepatic contour.  There is no lobar redistribution and fissure widening.   The liver fat was in the moderate category of 20.7% which is from 17.5 up to 22.1%.   I would point out to 22.2% or higher is considered severe fat.    Your iron quant was normal at 0.96 mg/g.   Gallbladder/biliary tree, spleen, pancreas, and adrenal glands were normal.   Kidneys show some renal cysts and postsurgical changes of partial left nephrectomy.   Colon views showed: Diverticulosis without evidence of acute diverticulitis.   Subcentimeter short axis lymph nodes were seen.    Atherosclerosis or plaque buildup was seen in some of the vessels that the saw on the MRI.   No significant free fluid was seen.    No acute or aggressive osseous lesion seen and multilevel degenerative changes seen of your spine.  Some postsurgical changes seen of your anterior abdominal wall as well.    In summary, moderate fatty liver seen by the fat quantification but clinically they thought that the fat was moderate to severe level.   No significant iron was seen in the liver.    You did have chronic liver disease changes seen including a mildly nodular contour and lobar redistribution and fissure widening but no splenomegaly or significant varices or ascites.    Stable appearance of your post surgical changes of the partial left nephrectomy with limited exam for local recurrence or metastatic disease noted due to the noncontrast technique.    December ultrasound showed the liver to be nodular back then.   Your prior MRI November 2022 suggested the liver fat was at 15.3% so it has actually worsened some. They did not describe or mention the chronic liver disease changes.    I am concerned that this fatty liver could be worsening your fibrosis as the mri suggests and we need to talk about your options to try to help this. We may need to do another test called and mre or a fibroscan to measure the fibrosis and look at any other options for the liver fat in addition to healthy habits.    I will ask Yusra to speed up your appointment from June 18 to sooner.    Dr. Lomeli

## 2025-05-27 ENCOUNTER — TELEPHONE ENCOUNTER (OUTPATIENT)
Dept: URBAN - METROPOLITAN AREA CLINIC 86 | Facility: CLINIC | Age: 70
End: 2025-05-27

## 2025-05-27 ENCOUNTER — OFFICE VISIT (OUTPATIENT)
Dept: URBAN - METROPOLITAN AREA TELEHEALTH 2 | Facility: TELEHEALTH | Age: 70
End: 2025-05-27

## 2025-05-27 RX ORDER — LEVOTHYROXINE SODIUM 100 UG/1
TAKE 1 TABLET (100 MCG) BY ORAL ROUTE ONCE DAILY TABLET ORAL 1
Qty: 0 | Refills: 0 | Status: ACTIVE | COMMUNITY
Start: 1900-01-01

## 2025-05-27 RX ORDER — ESTRADIOL 1 MG/1
TAKE 1 TABLET (1 MG) BY ORAL ROUTE ONCE DAILY TABLET ORAL 1
Qty: 0 | Refills: 0 | Status: ACTIVE | COMMUNITY
Start: 1900-01-01

## 2025-05-27 RX ORDER — UBIDECARENONE/VIT E ACET 100MG-5
1 CAPSULE CAPSULE ORAL ONCE A DAY
Status: ACTIVE | COMMUNITY

## 2025-05-27 RX ORDER — DILTIAZEM HYDROCHLORIDE 240 MG/1
TAKE 1 CAPSULE (240 MG) BY ORAL ROUTE ONCE DAILY CAPSULE, COATED, EXTENDED RELEASE ORAL 1
Qty: 0 | Refills: 0 | Status: ACTIVE | COMMUNITY
Start: 1900-01-01

## 2025-05-27 RX ORDER — HYDROCHLOROTHIAZIDE 12.5 MG/1
0.5 TABLET IN THE MORNING TABLET ORAL ONCE A DAY
Status: ACTIVE | COMMUNITY

## 2025-05-27 RX ORDER — ASCORBIC ACID 125 MG
AS DIRECTED TABLET,CHEWABLE ORAL
Status: ACTIVE | COMMUNITY

## 2025-05-27 RX ORDER — COMPOUNDING SYRUP VEHICLE 1 G/ML
AS DIRECTED SYRUP ORAL
Status: ON HOLD | COMMUNITY

## 2025-05-27 RX ORDER — MAGNESIUM OXIDE 400 MG/1
1 TABLET AS NEEDED TABLET ORAL ONCE A DAY
Status: ACTIVE | COMMUNITY

## 2025-05-27 NOTE — HPI-TODAY'S VISIT:
Pt is 70 year oldCaucasian/White female, after a previous visit June 2024 for an evaluationfor hepatitis C and on treatment evaluation of her newly diagnosed cirrhosis by fibroscan.  A copy of the note john be sent to the referring provider.  5/27/25 May 12 MRI released to us today. Lower thorax showed median sternotomy changes. Liver had moderate to severe steatosis/fatty liver changes and a mildly nodular hepatic contour. There is no lobar redistribution and fissure widening. The liver fat was in the moderate category of 20.7% which is from 17.5 up to 22.1%. I would point out to 22.2% or higher is considered severe fat. Your iron quant was normal at 0.96 mg/g. Gallbladder/biliary tree, spleen, pancreas, and adrenal glands were normal. Kidneys show some renal cysts and postsurgical changes of partial left nephrectomy. Colon views showed: Diverticulosis without evidence of acute diverticulitis. Subcentimeter short axis lymph nodes were seen. Atherosclerosis or plaque buildup was seen in some of the vessels that the saw on the MRI. No significant free fluid was seen. No acute or aggressive osseous lesion seen and multilevel degenerative changes seen of your spine. Some postsurgical changes seen of your anterior abdominal wall as well. In summary, moderate fatty liver seen by the fat quantification but clinically they thought that the fat was moderate to severe level. No significant iron was seen in the liver. You did have chronic liver disease changes seen including a mildly nodular contour and lobar redistribution and fissure widening but no splenomegaly or significant varices or ascites. Stable appearance of your post surgical changes of the partial left nephrectomy with limited exam for local recurrence or metastatic disease noted due to the noncontrast technique. December ultrasound showed the liver to be nodular back then. Your prior MRI November 2022 suggested the liver fat was at 15.3% so it has actually worsened some. They did not describe or mention the chronic liver disease changes. I am concerned that this fatty liver could be worsening your fibrosis as the mri suggests and we need to talk about your options to try to help this. We may need to do another test called and mre or a fibroscan to measure the fibrosis and look at any other options for the liver fat in addition to healthy habits. I will ask Yusra to speed up your appointment from June 18 to sooner. Dr. Lomeli Dear Mary White,  May 12 glucose 108 elevated and maybe not fasting that day? BUN of 12 creatinine 0.81 sodium 138 potassium 4.2 calcium 9.0 these others were normal except for the glucose which was again slightly up.  This The hep C PCR remains less than 15 and not detected. WBC 8.7 hemoglobin 12.5 MCV 90.9 platelet count 300 and neutrophils and lymphocytes normal and good to see. INR normal at 1.0. Your fib 4 index was low risk category at 1.2 with values less than 1.3 felt to be low risk to have advanced fibrosis.  Total protein 7.8 albumin 4.4 bilirubin 0.4 and direct 0.1 alk phos 60 AST 20 and ALT 63 with ideal ALT less than 25. Will correlate this with your imaging when back. Thank you. Dr. Lomeli   Dec 4 labs: Sodium 137 potassium 4.4 chloride 101 CO2 25 calcium 9.5 BUN of 17 creatinine 1.05 and total protein 8.1 albumin 4.6 alk phos 61 AST 20 ALT 13 bilirubin 0.4 WBC 9.4 hemoglobin 13.5 hematocrit slightly up at 41.7 platelet count 311 normal neutrophils slightly up at 5.19 lymphocytes normal at 2.42.  Basophil slightly up at 0.08 and eosinophils 0.29. Hep C PCR was not detected.  December 4 ultrasound showed liver to have increased echogenicity and nodular contour of the liver which limits exam. No dilated intrahepatic bile ducts seen in common bile duct was 3 mm. Gallbladder was normal and Cornelius sign negative. Pancreas was normal. Right kidney 12.2 cm with no hydronephrosis and left kidney 10.6 cm with no hydronephrosis. Eventually had a left renal mildly complicated cystic lesion that was seen. Spleen was 11.4 cm with a punctate echogenic focus that they thought could be a tiny granuloma Aorta normal caliber and IVC was normal proximally but not seen distally. Liver vessels were patent. Overall, they felt you had chronic liver disease/fatty liver changes and they recommended MRI for future imaging.  As you know your last MRI was back in November 2022 and at that timeframe your liver fat was elevated at 15.3% which is in the grade 1 mild category and your liver had no suspicious liver lesions. No mention of significant fibrosis seen. Fatty liver could be causing u/s to look more nodular?  You had no splenomegaly. You also had post left kidney partial nephrectomy changes seen with no evidence of local recurrence and you did have though a left renal cyst.  She had an allergic reaction to a ct scan and had to be admitted for allergic reaction for like 4 days. She wants to do scan without contrast. Treated steroids.  Allopurinol she in 2022 had an itch.  Lisinopril caused angioedema and so had that rxn also.  May 21 u/s: liver 18cm with marginal hepatomegaly and diffuse increase in hepatic echogenicity consistent with fatty liver.  Common bile duct 4mm in diameter which is normal.  Gallbladder normal.  Spleen normal size.  No ascites.  Portal vein normal size 1.1cm and normal directional flow.  Vessels patent.  Overall marginal hepatomegaly with hepatic fibrosis and fatty infiltration of the liver and normal doppler.  Prior May 2023 u.s with liver increased echogenicity and spleen 12.2cm.  She says has a treadmill and needs to look at walking a 30 min.  May 21 labs as expected showed hep C PCR was negative at less than 15. Always great to see that.  Glucose was 110 elevated and possibly you were not fasting that day? BUN of 18 creatinine slightly up at 1.06 and many people lately are having trouble keeping up with her hydration with the increasing heat. You may want to work on that hydration status as these labs would suggest that you are running a little dry. Sodium 137 potassium 5.0 calcium 9.8 albumin 4.4 bilirubin 0.4 alk phos 55 AST 15 ALT of 14 so liver labs remain ideal.   November 28 labs show WBC normal at 9.1 [id number] platelet count 339 and MCV 89.4. Neutrophils and lymphocytes normal.Glucose slightly up at 102 and unclear if you were fasting or not? Please share with primary provider.BUN of 14 and creatinine normal at 0.95 down from1.14 in August. Sodium 139 potassium 4.5 calcium 9.5 albumin 4.5  globin 0.4 alk phos 61 and AST of 16 ALT of 14.Hep C PCR remains less than 15 and not mentioned as being detected.   Addendum: nov 28: liver enlarged and measured 17.4cm. Fattyy infiltrattion. No focal mass. No duct dilaiton, CBD 5mm. No stones. Right kidney 12.8cm. Left kidney 10.8cm. Cyst left kidney in 19mm. Spleen 10.9cm. Hepatomegaly seen and so needs to work on weight and diet and exercise as can.  August 11 labs show hep C PCR remains less than 15and not mention has been detected. Glucose was 93, BUN is 17 but your creatinine wasup to 1.14 from 0.89 and that could be related due to the heat wave that we arefacing. You may want to increase your hydration and repeat that with yourprimary provider. Sodium 138 potassium 4.4 albumin 4.2 bilirubin 0.4alk phos 55 AST 15 ALT 13 so the liver labs remain stable. Your hep B testwhich you were concerned about was noted to be negative with the B surfaceantigen being clearly not active.   May 22 labs show hep C PCR less than 15 and notmentioned as been detected which is good to note. INR normal at 1.0 glucose 94BUN of 14 creatinine 0.89 sodium 139 potassium 4.6 calcium 9.1 albumin 4.3bilirubin 0.3 alk phos 57 AST is 16 and ALT of 13 with ideal ALT less than 25.WBC 8.8 hemoglobin 12 with hematocrit slightly lowat 34.5 and was previously 36.1. MCV 88.5 normal and platelet count 311.Neutrophils and lymphocytes both normal. Unclear why hematocrit is slightly lower this timeas this time you had the same exact hemoglobin of 12 as in November 2022 withthe hematocrit time being 36.1 then. Could be lab variation. Normal is from 35up to 45% so todays was only slightly off.  She did not do [facility] Yusra is calling for this.   May 22 2023 ultrasound shows liver to have increased echogenicity, with scattered punctate echogenic foci that are characterized ascalcified granulomas on your prior CT. No dilated bile ducts were seen and the commonbile duct was normal at 4 mm. Gallbladder also appeared normal and Cornelius signwas negative. Pancreas was normal were seen.Right kidney was 12.5 cm with no hydronephrosisleft kidney was 9 cm with redemonstrated cyst at the left upper pole measuring1.4 x 1.9 x 1.4 cm. Spleen measures 12.2 cm.Liver vessels were not fully seen as the hepaticand portal veins were normal but the main hepatic artery was obscured but theydid see the left hepatic artery. In summary, they felt the liver appeared to havesome fatty changes. As you recall, your November MRI showed us thatthe liver was still fatty at 15.3% and the partial left nephrectomy changeswere better seen on the MRI with them also mentioning the left renal cyst aswell. Please share with primary providers and we willdiscuss more at the next visit.  She says has not seen Dr Sanderson recently. November 16 labs show glucose slightly up at 100and was previously 88 in May but last year November was slightly up at 106.Please share with primary provider. BUN of 14 creatinine 0.92 down from 1.0 and prior1.08 so good to see that trend Down. Sodium 141 potassium 4.7 albumin 4.7 bilirubin 0.3alkaline phosphatase 79 AST 17 ALT 19 with ideal ALT less than 25.White blood cell count 8.8 hemoglobin 12 plateletcount 332 MCV 93. INR normal at 0.9. Hep C PCR remains less than 12 but not mentionedas being detected. Meld 6 and meld na 6 so remains low. November 14 MRI showed clear lung bases.No suspicious liver lesions with liver fatactually elevated at 15.3% which would be in the grade 1 liver fat range of 6.5up to 17.4%. Unremarkable gallbladder seen and no bile ductdilation. No splenomegaly was seen.No main pancreas duct dilation seen.No adrenal nodule seen.Prior partial left nephrectomy changes seen. Noevidence of local recurrence. Left renal cyst was seen and no hydronephrosiswas seen. No suspicious lymph nodes were seen.Overall postsurgical changes were seen in the anterior abdominal wall. Liver fat was seen at 15.3% which is considered inthe mild range desired is less than 6%. You need to work on that.Good to see that there was no local recurrence ormetastatic disease seen in the abdomen.   May 23: afp normal 3.0.Wbc 8.7 and hg 12.2 and hct 37.4 and mcv 93 andplat 348.neutrophils 5.9 and lymphs 1.9. Glu 88 and bun 13 and cr 1.00 and na 137 and k 4.6and cl 99 and co2 19 and ca 9.4 and alb 4.5 and tb 0.2 and alk 66 and ast 16and alt 14. ( ast 21 and alt 17 prior and ideal alt is less than 25 so doingwell.) HCV pcr negative.Inr 0.9. Meld 6 and meld na 6. May 23 MRI released to me.Lower thorax was normal.Liver remains fatty with the liver being at 13.4%but it is slightly lower than 15 to 16% back in May 2021. Ideal fat is lessthan 6% so we need to keep working on that. They see a lobular hepatic contour with subtlenodular enhancement without overt morphologic changes of chronic liver disease.Redemonstrated 6 mm segment 2 internal enhancingfocus seen with alcohol and they felt as a perfusion anomaly. Anothernonspecific 6 mm enhancing and diffusion restriction nodule seen in the capsulestable since November 2020. We will plan to do another MRI in 6 months to lookat these. Gallbladder/biliary tree normal.Spleen, pancreas, and adrenals normal.Postsurgical changes seen of the partial leftnephrectomy without evidence of recurrence. 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 This is an anatomic variant. Accessory replaced righthepatic artery seen arising from the superior mesenteric artery as well whichis again another anatomic variant. Some degenerative changes seen of the spine.  November 2021 labs show white cell count 9.5hemoglobin 12.3 platelet count 336 MCV 91 neutrophils 6.3 lymphocytes 2.2.These are all normal range. Glucose slightly up at 106 previously was 82.Maybe not fasting this day but she says it was and so may want to share withlocal provider. BUN of 19 creatinine 1.08 which is slightly up andpreviously was 1.03 but prior also was 1.05 last year.Sodium 136 potassium 4.4 calcium 9.3 albumin 4.5bilirubin 0.3 alkaline phosphatase 67 AST 21 ALT 17. Previously AST 24 and ALT20. Hep C PCR less than 12 and not detected.INR normal at 0.9.Meld low at 7 and meld na 7. She says that she has not gained weight and notexercising due to feet issues due to gout and gout is doing better and she ishaving the issues.  She says on cardizem cd and enalapril 10/25mg forher bp.  November 1 MRI back.Lower thorax shows median sternotomy wires.Liver showed fat deposition and redemonstration ofmild surface nodularity. No fat quant given. There are some perfusion abnormalities that areseen throughout the liver and that requires a 6-month follow-up surveillance ofthis. Gallbladder and biliary tree appeared to be normal.Spleen was normal.Pancreas was normal.Subcentimeter left simple cyst seen in the kidney.Status post left partial nephrectomy noted. No evidence of local recurrenceseen. Lymph nodes normal.Liver vessels normal.Retroperitoneum reported to be normal.No aggressive osseous lesions and postsurgicalchanges seen in the ventral abdominal midline.Overall they saw postsurgical changes of partialleft nephrectomy with no recurrent or metastatic disease seen. The liver isfatty appearing but they did not state by how much fat percentage. Sometimestechnically they are not able to do that measurement.  May 11: hcv pcr less than 12 and so great to see.Ast 24 and alt 20 and alk 63 and tb 0.3 and ca 9.5 and na 140 and k 4.3 and cr1.03 and bun 13. Glu 82. Wbc 9.5 and hg 12.9 and plat 363. Mcv 91. Prior cr1.05 and so little lower now 1.03.  May 11 2021 MRI shows the liver to be fatty at 15to 16%. Desired is less than 6%. Minimal surface nodularity still seen in theliver but without definite findings of cirrhosis.Redemonstrated 6 mm segment to hyperechoic lesionseen which they feel is a perfusion anomaly. We should check on that in 6months. Gallbladder biliary tree were normal. Spleennormal. Pancreas normal. Postsurgical changes seen of partial left nephrectomybut without evidence of recurrence seen. Redemonstrated subcentimeter left renal cyst seen.They mention that you have an anatomic variant ofthe replaced left hepatic artery arising from the left gastric artery. Alsoaccessory or replaced right hepatic artery arising from the superior mesentericartery. Postsurgical changes also seen along the anteriorabdominal wall. Overall they found no evidence of recurrent ormetastatic disease within the abdomen from the previous left partialnephrectomy and the liver was fatty at 15 to 16% fat. Nov 2020 10 and inr 0.9 and hcv less than 12. ast18 and alt 16 so at ideal. Tb 0.2 and alk 66. na 137 and k 4.2 and cl 99 andco2 24 and bun 17 and cr 1.05 slightly up and glu 122 elevated and maybe notfasting and show local md. wbc 8.7 hg 12.1 plat 338. mcv 93. Dr Sanderson: usually sees once a year. Nov 13 2020 na 139 and k 3.5 and cl 103 and bun 18and cr 1.30 and glu 131 and alb 4.2 and tb 0.3 and ast 17 and alt 15 and alk 56and wbc 10.3 and hg 12.6 plat 329.   Document Type: MRI Abdomen w/ + w/o ContrastDocument Date: November 10, 2020 11:36Document Status: Auth (Verified)Document Title: MRI Abdomen w/ + w/o ContrastPerformed By: Juanpablo Reza WVerified By: Juanpablo Reza on November 10,2020 13:26 Encounter info: [id number], [facility], Single Visit OP,11/10/2020 - 11/10/2020   * Final Report *IMPRESSION: Prior left partial nephrectomy withoutlocally recurrent or metastatic disease within the abdomen. Signature Line *** Final ***  Electronically Signed By: Juanpablo Reza 11/10/2020 13:26 Dictated by: Juanpablo Reza   Nov 7 2019 glu 98 and cr 1.11 slightly up and na138 and k 4.4 and cl 100 and total protein 8.2 slightly up. alb 4.6 and tb 0.4and alk 61 and ast 18 and alt 17 wbc 9.5 and hg 12.9 and plat 335. HCV pcr neg. mri 2019:  Document Type: MRI Abdomen w/ + w/o ContrastDocument Date: November 06, 2019 10:37Document Status: Auth (Verified)Document Title: MRI Abdomen w/ + w/o ContrastPerformed By: Geoff Farah IVVerified By: Geoff Farah IV on November06, 2019 11:23 Encounter info: [id number], [facility], Single Visit OP,11/6/2019 - 11/6/2019 IMPRESSION:  1. There is marked hepatic steatosis.Morphologically normal liver. No suspicious lesions. No stigmata of portalhypertension. 2. Stable postsurgical defect in the left kidneywith no enhancing lesions.   Signature Line *** Final ***  Electronically Signed By: Geoff Farah IVon 11/06/2019 11:23 Dictated by: Geoff Farah IV  Document Type: XR Chest 2 Views PA + Lat StndProtocol Document Date: November 06, 2019 11:08Document Status: Auth (Verified)Document Title: XR Chest 2 Views PA + Lat StndProtocol Performed By: Kwadwo Hare BVerified By: Kwadwo Hare on November 06,2019 13:26 Encounter info: [id number], [facility], Single Visit OP,11/6/2019 - 11/6/2019 IMPRESSION: Minimal basilar atelectasis.CT chest has increased sensitivity for metastaticdisease.   Signature Line *** Final ***  Electronically Signed By: Kwadwo Hare 11/06/2019 13:26 Dictated by: Kwadwo Hare She had a s/p robotic L partial nephrectomyshowing papillary RCC in April 2018.  She had neurological issues and she saw  and not noted to have brain tumor, noted to have hemorrhorage, shehas vascular abnormality. He referred to Dr Looney. She had a cath and lookedat flow and saw an anomaly. Says also told re radiation. Nothing done and shesays to return prn to see Dr Looney. She has not seen him or anyone for this again andno other issues.  She has not done the covid 19 vaccine and says shedoes not feel needs it.  did get one of them. Prior labs 2019 na 136 and k 4.0 and alb 4.4 andtb 0.4 and alk 51 and ast 14 and alt 12 and wbc 9.0 and hg 12.5 and epre785.afp 5.3 normal and hcv pcr negative.  March 2019 u/s:  Document Type: US Abdomen CompleteDocument Date: March 12, 2019 09:52Document Status: Auth (Verified)Document Title: US Abdomen CompletePerformed By: Paxton Ray SVerified By: Kalpesh Cisneros on March 12,2019 10:31 Encounter info: [id number], [facility], Single Visit OP,3/12/2019 - 3/12/2019  * Final Report * Reason For Exam HEPATITIS C  REPORT EXAM: US Abdomen Complete, US Abdomen DopplerComplete  CLINICAL INDICATION: HEPATITIS C. elevated AFP;Port HTN; left renal cell carcinoma IMPRESSION: 1. Coarsened hepatic echotexture with increasedechogenicity consistent with chronic liver disease/hepatic steatosis. Nosuspicious liver lesions. Patent hepatic vasculature with normal direction of flow.2. Punctate calcifications within the liver andspleen, likely the sequela of old granulomatous disease. The images were reviewed and interpreted by Colleen Cisneros MD.  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  9/11/18 labs gluc 91 cr 0.96 alp 56 tb 0.5 ast 16 alt 11 wbc 7 hgb 12.6 plt 304 apf 5 hep c negative  june 2018 u/s: * Final Report * Reason For Exam Hepatitis C, chronic REPORT EXAM: US Abdomen Doppler Complete, US AbdomenComplete  CLINICAL INDICATION: Hepatitis C, chronic. partialL nephrectomy  TECHNIQUE: Grayscale, pulsed wave and colorDoppler sonography of the upper abdomen were performed. COMPARISON: December 12, 2017 FINDINGS: IMPRESSION: 1. Echogenic liver with nodular contour suggestiveof hepatic steatosis/chronic liver disease. 2. Patent hepatic vasculature. Signature Line *** Final ***  Electronically Signed By: Kalpesh Cisneros 06/07/2018 10:40 july 2018 ct c/a/pno acute findings, colonic diverticulosis,atherosclerosis, granulomatous dz noted in liver. post surgical changessuggested involvoing the left kidney  ct chead july 2018acute parenchymal hemorrhage in right parietal lobe 4/16/18 kidney path: KIDNEY, LEFT, MASS, ROBOTIC PARTIAL NEPHRECTOMY:Renal cell carcinoma, papillary type 1, greatestcarcinoma dimension 4.4 cm Carcinoma is limited to the kidneyFuhrman nuclear grade [id number] cell change in approximately 10% tumor, nosarcomatoid change identified Carcinoma at inked parenchymal margin of excision(tumor grossly perforated) Uninvolved renal parenchyma with chronicinterstitial nephritis  LYMPH NODES, PARA-AORTIC, EXCISION:No metastatic carcinoma identified in three lymphnodes (0/3) Focus of endosalpingiosis in one lymph node SOFT TISSUE, UMM-TUMOR FAT, EXCISION:Benign fibroadipose tissueNo carcinoma identified She did colon with dr Mcgee and do in 5 yrs 2021. Seing Dr Shields april 2022 and he said she wasstable.

## 2025-06-02 ENCOUNTER — LAB OUTSIDE AN ENCOUNTER (OUTPATIENT)
Dept: URBAN - METROPOLITAN AREA TELEHEALTH 2 | Facility: TELEHEALTH | Age: 70
End: 2025-06-02

## 2025-06-18 ENCOUNTER — OFFICE VISIT (OUTPATIENT)
Dept: URBAN - METROPOLITAN AREA TELEHEALTH 2 | Facility: TELEHEALTH | Age: 70
End: 2025-06-18
Payer: MEDICARE

## 2025-06-18 DIAGNOSIS — K70.0 FATTY LIVER: ICD-10-CM

## 2025-06-18 DIAGNOSIS — K25.9 GASTRIC ULCER: ICD-10-CM

## 2025-06-18 DIAGNOSIS — Z85.038 HISTORY OF COLON CANCER: ICD-10-CM

## 2025-06-18 DIAGNOSIS — K44.9 HIATAL HERNIA: ICD-10-CM

## 2025-06-18 DIAGNOSIS — G62.9 NEUROPATHY: ICD-10-CM

## 2025-06-18 DIAGNOSIS — R74.8 ABNORMAL LIVER ENZYMES: ICD-10-CM

## 2025-06-18 DIAGNOSIS — I10 HYPERTENSION: ICD-10-CM

## 2025-06-18 DIAGNOSIS — B18.2 CHRONIC ACTIVE HEPATITIS C: ICD-10-CM

## 2025-06-18 DIAGNOSIS — K76.9 LIVER LESION: ICD-10-CM

## 2025-06-18 DIAGNOSIS — M10.09 GOUT, ARTHRITIS: ICD-10-CM

## 2025-06-18 DIAGNOSIS — I63.9 CVA (CEREBRAL VASCULAR ACCIDENT): ICD-10-CM

## 2025-06-18 DIAGNOSIS — B96.81 HELICOBACTER PYLORI (H. PYLORI) INFECTION: ICD-10-CM

## 2025-06-18 DIAGNOSIS — C18.9 COLON CANCER: ICD-10-CM

## 2025-06-18 DIAGNOSIS — E66.812 CLASS 2 OBESITY: ICD-10-CM

## 2025-06-18 DIAGNOSIS — Z79.899 HIGH RISK MEDICATION USE: ICD-10-CM

## 2025-06-18 DIAGNOSIS — E66.9 OBESITY: ICD-10-CM

## 2025-06-18 DIAGNOSIS — K74.00 HEPATIC FIBROSIS: ICD-10-CM

## 2025-06-18 DIAGNOSIS — Z85.528 HISTORY OF RENAL CELL CANCER: ICD-10-CM

## 2025-06-18 PROCEDURE — 99215 OFFICE O/P EST HI 40 MIN: CPT

## 2025-06-18 RX ORDER — DILTIAZEM HYDROCHLORIDE 240 MG/1
TAKE 1 CAPSULE (240 MG) BY ORAL ROUTE ONCE DAILY CAPSULE, COATED, EXTENDED RELEASE ORAL 1
Qty: 0 | Refills: 0 | Status: ACTIVE | COMMUNITY
Start: 1900-01-01

## 2025-06-18 RX ORDER — ASCORBIC ACID 125 MG
AS DIRECTED TABLET,CHEWABLE ORAL
Status: ACTIVE | COMMUNITY

## 2025-06-18 RX ORDER — UBIDECARENONE/VIT E ACET 100MG-5
1 CAPSULE CAPSULE ORAL ONCE A DAY
Status: ACTIVE | COMMUNITY

## 2025-06-18 RX ORDER — LEVOTHYROXINE SODIUM 100 UG/1
TAKE 1 TABLET (100 MCG) BY ORAL ROUTE ONCE DAILY TABLET ORAL 1
Qty: 0 | Refills: 0 | Status: ACTIVE | COMMUNITY
Start: 1900-01-01

## 2025-06-18 RX ORDER — COMPOUNDING SYRUP VEHICLE 1 G/ML
AS DIRECTED SYRUP ORAL
Status: ON HOLD | COMMUNITY

## 2025-06-18 RX ORDER — HYDROCHLOROTHIAZIDE 12.5 MG/1
0.5 TABLET IN THE MORNING TABLET ORAL ONCE A DAY
Status: ACTIVE | COMMUNITY

## 2025-06-18 RX ORDER — MAGNESIUM OXIDE 400 MG/1
1 TABLET AS NEEDED TABLET ORAL ONCE A DAY
Status: ACTIVE | COMMUNITY

## 2025-06-18 RX ORDER — ESTRADIOL 1 MG/1
TAKE 1 TABLET (1 MG) BY ORAL ROUTE ONCE DAILY TABLET ORAL 1
Qty: 0 | Refills: 0 | Status: ACTIVE | COMMUNITY
Start: 1900-01-01

## 2025-06-18 NOTE — HPI-TODAY'S VISIT:
Pt is 70 year oldCaucasian/White female, after a previous visit Dec 2024 for an evaluationfor hepatitis C and on treatment evaluation of her newly diagnosed cirrhosis by fibroscan.  A copy of the note john be sent to the referring provider.  5/27/25 May 12 MRI released to us today. Lower thorax showed median sternotomy changes. Liver had moderate to severe steatosis/fatty liver changes and a mildly nodular hepatic contour. There is no lobar redistribution and fissure widening. The liver fat was in the moderate category of 20.7% which is from 17.5 up to 22.1%. I would point out to 22.2% or higher is considered severe fat. Your iron quant was normal at 0.96 mg/g. Gallbladder/biliary tree, spleen, pancreas, and adrenal glands were normal. Kidneys show some renal cysts and postsurgical changes of partial left nephrectomy. Colon views showed: Diverticulosis without evidence of acute diverticulitis. Subcentimeter short axis lymph nodes were seen. Atherosclerosis or plaque buildup was seen in some of the vessels that the saw on the MRI. No significant free fluid was seen. No acute or aggressive osseous lesion seen and multilevel degenerative changes seen of your spine. Some postsurgical changes seen of your anterior abdominal wall as well. In summary, moderate fatty liver seen by the fat quantification but clinically they thought that the fat was moderate to severe level. No significant iron was seen in the liver. You did have chronic liver disease changes seen including a mildly nodular contour and lobar redistribution and fissure widening but no splenomegaly or significant varices or ascites. Stable appearance of your post surgical changes of the partial left nephrectomy with limited exam for local recurrence or metastatic disease noted due to the noncontrast technique. December ultrasound showed the liver to be nodular back then. Your prior MRI November 2022 suggested the liver fat was at 15.3% so it has actually worsened some. They did not describe or mention the chronic liver disease changes. I am concerned that this fatty liver could be worsening your fibrosis as the mri suggests and we need to talk about your options to try to help this. We may need to do another test called and mre or a fibroscan to measure the fibrosis and look at any other options for the liver fat in addition to healthy habits.   May 12 glucose 108 elevated and maybe not fasting that day? BUN of 12 creatinine 0.81 sodium 138 potassium 4.2 calcium 9.0 these others were normal except for the glucose which was again slightly up.  This The hep C PCR remains less than 15 and not detected. WBC 8.7 hemoglobin 12.5 MCV 90.9 platelet count 300 and neutrophils and lymphocytes normal and good to see. INR normal at 1.0. Your fib 4 index was low risk category at 1.2 with values less than 1.3 felt to be low risk to have advanced fibrosis.  Total protein 7.8 albumin 4.4 bilirubin 0.4 and direct 0.1 alk phos 60 AST 20 and ALT 63 with ideal ALT less than 25. Will correlate this with your imaging when back.  She says that weight wise she is not exercising of late and not on diet and needs to get more exercise and get the fat drop some if can.  She says post colon cancer now 13 years   Dec 4 2025 labs: Sodium 137 potassium 4.4 chloride 101 CO2 25 calcium 9.5 BUN of 17 creatinine 1.05 and total protein 8.1 albumin 4.6 alk phos 61 AST 20 ALT 13 bilirubin 0.4 WBC 9.4 hemoglobin 13.5 hematocrit slightly up at 41.7 platelet count 311 normal neutrophils slightly up at 5.19 lymphocytes normal at 2.42.  Basophil slightly up at 0.08 and eosinophils 0.29. Hep C PCR was not detected.  December 4 ultrasound showed liver to have increased echogenicity and nodular contour of the liver which limits exam. No dilated intrahepatic bile ducts seen in common bile duct was 3 mm. Gallbladder was normal and Cornelius sign negative. Pancreas was normal. Right kidney 12.2 cm with no hydronephrosis and left kidney 10.6 cm with no hydronephrosis. Eventually had a left renal mildly complicated cystic lesion that was seen. Spleen was 11.4 cm with a punctate echogenic focus that they thought could be a tiny granuloma Aorta normal caliber and IVC was normal proximally but not seen distally. Liver vessels were patent. Overall, they felt you had chronic liver disease/fatty liver changes and they recommended MRI for future imaging.  As you know your last MRI was back in November 2022 and at that timeframe your liver fat was elevated at 15.3% which is in the grade 1 mild category and your liver had no suspicious liver lesions. No mention of significant fibrosis seen. Fatty liver could be causing u/s to look more nodular?  You had no splenomegaly. You also had post left kidney partial nephrectomy changes seen with no evidence of local recurrence and you did have though a left renal cyst.  She had an allergic reaction to a ct scan and had to be admitted for allergic reaction for like 4 days. She wants to do scan without contrast. Treated steroids.  Allopurinol she in 2022 had an itch.  Lisinopril caused angioedema and so had that rxn also.  May 21 u/s: liver 18cm with marginal hepatomegaly and diffuse increase in hepatic echogenicity consistent with fatty liver.  Common bile duct 4mm in diameter which is normal.  Gallbladder normal.  Spleen normal size.  No ascites.  Portal vein normal size 1.1cm and normal directional flow.  Vessels patent.  Overall marginal hepatomegaly with hepatic fibrosis and fatty infiltration of the liver and normal doppler.  Prior May 2023 u.s with liver increased echogenicity and spleen 12.2cm.  She says has a treadmill and needs to look at walking a 30 min.  May 21 labs as expected showed hep C PCR was negative at less than 15. Always great to see that.  Glucose was 110 elevated and possibly you were not fasting that day? BUN of 18 creatinine slightly up at 1.06 and many people lately are having trouble keeping up with her hydration with the increasing heat. You may want to work on that hydration status as these labs would suggest that you are running a little dry. Sodium 137 potassium 5.0 calcium 9.8 albumin 4.4 bilirubin 0.4 alk phos 55 AST 15 ALT of 14 so liver labs remain ideal.   November 28 labs show WBC normal at 9.1 [id number] platelet count 339 and MCV 89.4. Neutrophils and lymphocytes normal.Glucose slightly up at 102 and unclear if you were fasting or not? Please share with primary provider.BUN of 14 and creatinine normal at 0.95 down from1.14 in August. Sodium 139 potassium 4.5 calcium 9.5 albumin 4.5  globin 0.4 alk phos 61 and AST of 16 ALT of 14.Hep C PCR remains less than 15 and not mentioned as being detected.   Addendum: nov 28: liver enlarged and measured 17.4cm. Fattyy infiltrattion. No focal mass. No duct dilaiton, CBD 5mm. No stones. Right kidney 12.8cm. Left kidney 10.8cm. Cyst left kidney in 19mm. Spleen 10.9cm. Hepatomegaly seen and so needs to work on weight and diet and exercise as can.  August 11 labs show hep C PCR remains less than 15and not mention has been detected. Glucose was 93, BUN is 17 but your creatinine wasup to 1.14 from 0.89 and that could be related due to the heat wave that we arefacing. You may want to increase your hydration and repeat that with yourprimary provider. Sodium 138 potassium 4.4 albumin 4.2 bilirubin 0.4alk phos 55 AST 15 ALT 13 so the liver labs remain stable. Your hep B testwhich you were concerned about was noted to be negative with the B surfaceantigen being clearly not active.   May 22 labs show hep C PCR less than 15 and notmentioned as been detected which is good to note. INR normal at 1.0 glucose 94BUN of 14 creatinine 0.89 sodium 139 potassium 4.6 calcium 9.1 albumin 4.3bilirubin 0.3 alk phos 57 AST is 16 and ALT of 13 with ideal ALT less than 25.WBC 8.8 hemoglobin 12 with hematocrit slightly lowat 34.5 and was previously 36.1. MCV 88.5 normal and platelet count 311.Neutrophils and lymphocytes both normal. Unclear why hematocrit is slightly lower this timeas this time you had the same exact hemoglobin of 12 as in November 2022 withthe hematocrit time being 36.1 then. Could be lab variation. Normal is from 35up to 45% so todays was only slightly off.  She did not do the [facility] Yusra is calling for this.   May 22 2023 ultrasound shows liver to have increased echogenicity, with scattered punctate echogenic foci that are characterized ascalcified granulomas on your prior CT. No dilated bile ducts were seen and the commonbile duct was normal at 4 mm. Gallbladder also appeared normal and Cornelius signwas negative. Pancreas was normal were seen.Right kidney was 12.5 cm with no hydronephrosisleft kidney was 9 cm with redemonstrated cyst at the left upper pole measuring1.4 x 1.9 x 1.4 cm. Spleen measures 12.2 cm.Liver vessels were not fully seen as the hepaticand portal veins were normal but the main hepatic artery was obscured but theydid see the left hepatic artery. In summary, they felt the liver appeared to havesome fatty changes. As you recall, your November MRI showed us thatthe liver was still fatty at 15.3% and the partial left nephrectomy changeswere better seen on the MRI with them also mentioning the left renal cyst aswell. Please share with primary providers and we willdiscuss more at the next visit.  She says has not seen Dr Sanderson recently. November 16 labs show glucose slightly up at 100and was previously 88 in May but last year November was slightly up at 106.Please share with primary provider. BUN of 14 creatinine 0.92 down from 1.0 and prior1.08 so good to see that trend Down. Sodium 141 potassium 4.7 albumin 4.7 bilirubin 0.3alkaline phosphatase 79 AST 17 ALT 19 with ideal ALT less than 25.White blood cell count 8.8 hemoglobin 12 plateletcount 332 MCV 93. INR normal at 0.9. Hep C PCR remains less than 12 but not mentionedas being detected. Meld 6 and meld na 6 so remains low. November 14 MRI showed clear lung bases.No suspicious liver lesions with liver fatactually elevated at 15.3% which would be in the grade 1 liver fat range of 6.5up to 17.4%. Unremarkable gallbladder seen and no bile ductdilation. No splenomegaly was seen.No main pancreas duct dilation seen.No adrenal nodule seen.Prior partial left nephrectomy changes seen. Noevidence of local recurrence. Left renal cyst was seen and no hydronephrosiswas seen. No suspicious lymph nodes were seen.Overall postsurgical changes were seen in the anterior abdominal wall. Liver fat was seen at 15.3% which is considered inthe mild range desired is less than 6%. You need to work on that.Good to see that there was no local recurrence ormetastatic disease seen in the abdomen.   May 23: afp normal 3.0.Wbc 8.7 and hg 12.2 and hct 37.4 and mcv 93 andplat 348.neutrophils 5.9 and lymphs 1.9. Glu 88 and bun 13 and cr 1.00 and na 137 and k 4.6and cl 99 and co2 19 and ca 9.4 and alb 4.5 and tb 0.2 and alk 66 and ast 16and alt 14. ( ast 21 and alt 17 prior and ideal alt is less than 25 so doingwell.) HCV pcr negative.Inr 0.9. Meld 6 and meld na 6. May 23 MRI released to me.Lower thorax was normal.Liver remains fatty with the liver being at 13.4%but it is slightly lower than 15 to 16% back in May 2021. Ideal fat is lessthan 6% so we need to keep working on that. They see a lobular hepatic contour with subtlenodular enhancement without overt morphologic changes of chronic liver disease.Redemonstrated 6 mm segment 2 internal enhancingfocus seen with alcohol and they felt as a perfusion anomaly. Anothernonspecific 6 mm enhancing and diffusion restriction nodule seen in the capsulestable since November 2020. We will plan to do another MRI in 6 months to lookat these. Gallbladder/biliary tree normal.Spleen, pancreas, and adrenals normal.Postsurgical changes seen of the partial leftnephrectomy without evidence of recurrence. 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 This is an anatomic variant. Accessory replaced righthepatic artery seen arising from the superior mesenteric artery as well whichis again another anatomic variant. Some degenerative changes seen of the spine.  November 2021 labs show white cell count 9.5hemoglobin 12.3 platelet count 336 MCV 91 neutrophils 6.3 lymphocytes 2.2.These are all normal range. Glucose slightly up at 106 previously was 82.Maybe not fasting this day but she says it was and so may want to share withlocal provider. BUN of 19 creatinine 1.08 which is slightly up andpreviously was 1.03 but prior also was 1.05 last year.Sodium 136 potassium 4.4 calcium 9.3 albumin 4.5bilirubin 0.3 alkaline phosphatase 67 AST 21 ALT 17. Previously AST 24 and ALT20. Hep C PCR less than 12 and not detected.INR normal at 0.9.Meld low at 7 and meld na 7. She says that she has not gained weight and notexercising due to feet issues due to gout and gout is doing better and she ishaving the issues.  She says on cardizem cd and enalapril 10/25mg forher bp.  November 1 MRI back.Lower thorax shows median sternotomy wires.Liver showed fat deposition and redemonstration ofmild surface nodularity. No fat quant given. There are some perfusion abnormalities that areseen throughout the liver and that requires a 6-month follow-up surveillance ofthis. Gallbladder and biliary tree appeared to be normal.Spleen was normal.Pancreas was normal.Subcentimeter left simple cyst seen in the kidney.Status post left partial nephrectomy noted. No evidence of local recurrenceseen. Lymph nodes normal.Liver vessels normal.Retroperitoneum reported to be normal.No aggressive osseous lesions and postsurgicalchanges seen in the ventral abdominal midline.Overall they saw postsurgical changes of partialleft nephrectomy with no recurrent or metastatic disease seen. The liver isfatty appearing but they did not state by how much fat percentage. Sometimestechnically they are not able to do that measurement.  May 11: hcv pcr less than 12 and so great to see.Ast 24 and alt 20 and alk 63 and tb 0.3 and ca 9.5 and na 140 and k 4.3 and cr1.03 and bun 13. Glu 82. Wbc 9.5 and hg 12.9 and plat 363. Mcv 91. Prior cr1.05 and so little lower now 1.03.  May 11 2021 MRI shows the liver to be fatty at 15to 16%. Desired is less than 6%. Minimal surface nodularity still seen in theliver but without definite findings of cirrhosis.Redemonstrated 6 mm segment to hyperechoic lesionseen which they feel is a perfusion anomaly. We should check on that in 6months. Gallbladder biliary tree were normal. Spleennormal. Pancreas normal. Postsurgical changes seen of partial left nephrectomybut without evidence of recurrence seen. Redemonstrated subcentimeter left renal cyst seen.They mention that you have an anatomic variant ofthe replaced left hepatic artery arising from the left gastric artery. Alsoaccessory or replaced right hepatic artery arising from the superior mesentericartery. Postsurgical changes also seen along the anteriorabdominal wall. Overall they found no evidence of recurrent ormetastatic disease within the abdomen from the previous left partialnephrectomy and the liver was fatty at 15 to 16% fat. Nov 2020 10 and inr 0.9 and hcv less than 12. ast18 and alt 16 so at ideal. Tb 0.2 and alk 66. na 137 and k 4.2 and cl 99 andco2 24 and bun 17 and cr 1.05 slightly up and glu 122 elevated and maybe notfasting and show local md. wbc 8.7 hg 12.1 plat 338. mcv 93. Dr Sanderson: usually sees once a year. Nov 13 2020 na 139 and k 3.5 and cl 103 and bun 18and cr 1.30 and glu 131 and alb 4.2 and tb 0.3 and ast 17 and alt 15 and alk 56and wbc 10.3 and hg 12.6 plat 329.   Document Type: MRI Abdomen w/ + w/o ContrastDocument Date: November 10, 2020 11:36Document Status: Auth (Verified)Document Title: MRI Abdomen w/ + w/o ContrastPerformed By: Juanpablo Reza WVerified By: Juanpablo Reza on November 10,2020 13:26 Encounter info: [id number], [facility], Single Visit OP,11/10/2020 - 11/10/2020   * Final Report *IMPRESSION: Prior left partial nephrectomy withoutlocally recurrent or metastatic disease within the abdomen. Signature Line *** Final ***  Electronically Signed By: Juanpablo Reza 11/10/2020 13:26 Dictated by: Juanpablo Reza   Nov 7 2019 glu 98 and cr 1.11 slightly up and na138 and k 4.4 and cl 100 and total protein 8.2 slightly up. alb 4.6 and tb 0.4and alk 61 and ast 18 and alt 17 wbc 9.5 and hg 12.9 and plat 335. HCV pcr neg. mri 2019:  Document Type: MRI Abdomen w/ + w/o ContrastDocument Date: November 06, 2019 10:37Document Status: Auth (Verified)Document Title: MRI Abdomen w/ + w/o ContrastPerformed By: Geoff Farah IVVerified By: Geoff Farah IV on November06, 2019 11:23 Encounter info: [id number], [facility], Single Visit OP,11/6/2019 - 11/6/2019 IMPRESSION:  1. There is marked hepatic steatosis.Morphologically normal liver. No suspicious lesions. No stigmata of portalhypertension. 2. Stable postsurgical defect in the left kidneywith no enhancing lesions.   Signature Line *** Final ***  Electronically Signed By: Geoff Farah IVon 11/06/2019 11:23 Dictated by: Geoff Farah IV  Document Type: XR Chest 2 Views PA + Lat StndProtocol Document Date: November 06, 2019 11:08Document Status: Auth (Verified)Document Title: XR Chest 2 Views PA + Lat StndProtocol Performed By: Kwadwo Hare BVerified By: Kwadwo Hare on November 06,2019 13:26 Encounter info: [id number], [facility], Single Visit OP,11/6/2019 - 11/6/2019 IMPRESSION: Minimal basilar atelectasis.CT chest has increased sensitivity for metastaticdisease.   Signature Line *** Final ***  Electronically Signed By: Kwadwo Hare 11/06/2019 13:26 Dictated by: Kwadwo Hare She had a s/p robotic L partial nephrectomyshowing papillary RCC in April 2018.  She had neurological issues and she saw  and not noted to have brain tumor, noted to have hemorrhorage, shehas vascular abnormality. He referred to Dr Looney. She had a cath and lookedat flow and saw an anomaly. Says also told re radiation. Nothing done and shesays to return prn to see Dr Looney. She has not seen him or anyone for this again andno other issues.  She has not done the covid 19 vaccine and says shedoes not feel needs it.  did get one of them. Prior labs 2019 na 136 and k 4.0 and alb 4.4 andtb 0.4 and alk 51 and ast 14 and alt 12 and wbc 9.0 and hg 12.5 and [id number] 5.3 normal and hcv pcr negative.  March 2019 u/s:  Document Type: US Abdomen CompleteDocument Date: March 12, 2019 09:52Document Status: Auth (Verified)Document Title: US Abdomen CompletePerformed By: Paxton Ray SVerified By: Kalpesh Cisneros on March 12,2019 10:31 [facility] [id number], [facility],3/12/2019 - 3/12/2019  * Final Report * Reason For Exam HEPATITIS C  REPORT EXAM: US Abdomen Complete, US Abdomen DopplerComplete  CLINICAL INDICATION: HEPATITIS C. elevated AFP;Port HTN; left renal cell carcinoma IMPRESSION: 1. Coarsened hepatic echotexture with increasedechogenicity consistent with chronic liver disease/hepatic steatosis. Nosuspicious liver lesions. Patent hepatic vasculature with normal direction of flow.2. Punctate calcifications within the liver andspleen, likely the sequela of old granulomatous disease. The images were reviewed and interpreted by Colleen Cisneros MD.  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  9/11/18 labs gluc 91 cr 0.96 alp 56 tb 0.5 ast 16 alt 11 wbc 7 hgb 12.6 plt 304 apf 5 hep c negative  june 2018 u/s: * Final Report * Reason For Exam Hepatitis C, chronic REPORT EXAM: US Abdomen Doppler Complete, US AbdomenComplete  CLINICAL INDICATION: Hepatitis C, chronic. partialL nephrectomy  TECHNIQUE: Grayscale, pulsed wave and colorDoppler sonography of the upper abdomen were performed. COMPARISON: December 12, 2017 FINDINGS: IMPRESSION: 1. Echogenic liver with nodular contour suggestiveof hepatic steatosis/chronic liver disease. 2. Patent hepatic vasculature. Signature Line *** Final ***  Electronically Signed By: Kalpesh Cisneros 06/07/2018 10:40 july 2018 ct c/a/pno acute findings, colonic diverticulosis,atherosclerosis, granulomatous dz noted in liver. post surgical changessuggested involvoing the left kidney  ct chead july 2018acute parenchymal hemorrhage in right parietal lobe 4/16/18 kidney path: KIDNEY, LEFT, MASS, ROBOTIC PARTIAL NEPHRECTOMY:Renal cell carcinoma, papillary type 1, greatestcarcinoma dimension 4.4 cm Carcinoma is limited to the kidneyFuhrman nuclear grade D2Ubmqa cell change in approximately 10% tumor, nosarcomatoid change identified Carcinoma at inked parenchymal margin of excision(tumor grossly perforated) Uninvolved renal parenchyma with chronicinterstitial nephritis  LYMPH NODES, PARA-AORTIC, EXCISION:No metastatic carcinoma identified in three lymphnodes (0/3) Focus of endosalpingiosis in one lymph node SOFT TISSUE, UMM-TUMOR FAT, EXCISION:Benign fibroadipose tissueNo carcinoma identified She did colon with dr Mcgee and do in 5 yrs 2021. Seing Dr Shields april 2022 and he said she wasstable.  Plan: 1. Needs to do labs and u/s in 6m. 2. Work on diet and exercise. 3. She is thinking of joining [facility].  Duration of visit was 40  minutes with 10 minutes spent prepping chart and loading info for the visit to [facility] records and then 30 min from 222 to 252 pm  additional minutes by clock due to internet fluxes with time spent for this healow  visit reviewing chart and events and plans with the pt.

## 2025-06-18 NOTE — EXAM-PHYSICAL EXAM
Telemed self reported:    Gen: no distress.  Eyes: no jaundice.  Mouth: no thrush.  CV: no chest pain.  Resp: no wheezes.  Abd: no pain.  Ext: no edema.  Neuro: no weakness.

## 2025-07-11 NOTE — PROCEDURE: OTHER
Other (Free Text): Wound care and Vaseline provided to patient.
Render Risk Assessment In Note?: no
Note Text (......Xxx Chief Complaint.): This diagnosis correlates with the
Detail Level: Zone
No